# Patient Record
Sex: MALE | Race: BLACK OR AFRICAN AMERICAN | Employment: FULL TIME | URBAN - METROPOLITAN AREA
[De-identification: names, ages, dates, MRNs, and addresses within clinical notes are randomized per-mention and may not be internally consistent; named-entity substitution may affect disease eponyms.]

---

## 2018-04-25 ENCOUNTER — APPOINTMENT (OUTPATIENT)
Dept: GENERAL RADIOLOGY | Age: 48
DRG: 194 | End: 2018-04-25
Attending: EMERGENCY MEDICINE
Payer: COMMERCIAL

## 2018-04-25 ENCOUNTER — OFFICE VISIT (OUTPATIENT)
Dept: FAMILY MEDICINE CLINIC | Facility: CLINIC | Age: 48
End: 2018-04-25

## 2018-04-25 ENCOUNTER — TELEPHONE (OUTPATIENT)
Dept: FAMILY MEDICINE CLINIC | Facility: CLINIC | Age: 48
End: 2018-04-25

## 2018-04-25 ENCOUNTER — HOSPITAL ENCOUNTER (INPATIENT)
Facility: HOSPITAL | Age: 48
LOS: 1 days | Discharge: HOME | DRG: 194 | End: 2018-04-27
Attending: EMERGENCY MEDICINE | Admitting: HOSPITALIST
Payer: COMMERCIAL

## 2018-04-25 VITALS
RESPIRATION RATE: 16 BRPM | HEIGHT: 73 IN | HEART RATE: 75 BPM | WEIGHT: 315 LBS | OXYGEN SATURATION: 95 % | TEMPERATURE: 98 F | DIASTOLIC BLOOD PRESSURE: 84 MMHG | SYSTOLIC BLOOD PRESSURE: 138 MMHG | BODY MASS INDEX: 41.75 KG/M2

## 2018-04-25 DIAGNOSIS — R77.8 ELEVATED TROPONIN: Primary | ICD-10-CM

## 2018-04-25 DIAGNOSIS — Z02.9 ADMINISTRATIVE ENCOUNTER: Primary | ICD-10-CM

## 2018-04-25 PROBLEM — E66.01 MORBID OBESITY WITH BMI OF 45.0-49.9, ADULT (HCC): Status: ACTIVE | Noted: 2018-04-25

## 2018-04-25 PROBLEM — J12.9 VIRAL PNEUMONIA: Status: ACTIVE | Noted: 2018-04-25

## 2018-04-25 PROBLEM — E11.9 TYPE 2 DIABETES MELLITUS WITHOUT COMPLICATION, WITHOUT LONG-TERM CURRENT USE OF INSULIN (HCC): Chronic | Status: ACTIVE | Noted: 2018-04-25

## 2018-04-25 PROBLEM — R79.89 ELEVATED TROPONIN: Status: ACTIVE | Noted: 2018-04-25

## 2018-04-25 PROBLEM — G47.33 OSA (OBSTRUCTIVE SLEEP APNEA): Chronic | Status: ACTIVE | Noted: 2018-04-25

## 2018-04-25 PROBLEM — I10 ESSENTIAL HYPERTENSION: Chronic | Status: ACTIVE | Noted: 2018-04-25

## 2018-04-25 PROBLEM — I50.22 CHRONIC SYSTOLIC HEART FAILURE (HCC): Chronic | Status: ACTIVE | Noted: 2018-04-25

## 2018-04-25 PROCEDURE — 71045 X-RAY EXAM CHEST 1 VIEW: CPT | Performed by: EMERGENCY MEDICINE

## 2018-04-25 PROCEDURE — 99223 1ST HOSP IP/OBS HIGH 75: CPT | Performed by: HOSPITALIST

## 2018-04-25 RX ORDER — FUROSEMIDE 40 MG/1
40 TABLET ORAL 3 TIMES DAILY
COMMUNITY
End: 2018-06-26 | Stop reason: ALTCHOICE

## 2018-04-25 RX ORDER — ISOSORBIDE DINITRATE 20 MG/1
10 TABLET ORAL DAILY
Status: DISCONTINUED | OUTPATIENT
Start: 2018-04-26 | End: 2018-04-26

## 2018-04-25 RX ORDER — AMLODIPINE BESYLATE 5 MG/1
5 TABLET ORAL DAILY
Status: DISCONTINUED | OUTPATIENT
Start: 2018-04-26 | End: 2018-04-27

## 2018-04-25 RX ORDER — ASPIRIN 81 MG/1
324 TABLET, CHEWABLE ORAL ONCE
Status: COMPLETED | OUTPATIENT
Start: 2018-04-25 | End: 2018-04-25

## 2018-04-25 RX ORDER — SPIRONOLACTONE 25 MG/1
25 TABLET ORAL DAILY
Status: DISCONTINUED | OUTPATIENT
Start: 2018-04-26 | End: 2018-04-27

## 2018-04-25 RX ORDER — DIGOXIN 250 MCG
250 TABLET ORAL DAILY
Status: DISCONTINUED | OUTPATIENT
Start: 2018-04-26 | End: 2018-04-27

## 2018-04-25 RX ORDER — IPRATROPIUM BROMIDE AND ALBUTEROL SULFATE 2.5; .5 MG/3ML; MG/3ML
3 SOLUTION RESPIRATORY (INHALATION) EVERY 6 HOURS PRN
Status: DISCONTINUED | OUTPATIENT
Start: 2018-04-25 | End: 2018-04-27

## 2018-04-25 RX ORDER — CARVEDILOL 12.5 MG/1
25 TABLET ORAL 2 TIMES DAILY WITH MEALS
Status: DISCONTINUED | OUTPATIENT
Start: 2018-04-25 | End: 2018-04-27

## 2018-04-25 RX ORDER — CARVEDILOL 25 MG/1
25 TABLET ORAL 2 TIMES DAILY WITH MEALS
COMMUNITY
End: 2020-03-15

## 2018-04-25 RX ORDER — POTASSIUM CHLORIDE 20 MEQ/1
40 TABLET, EXTENDED RELEASE ORAL EVERY 4 HOURS
Status: COMPLETED | OUTPATIENT
Start: 2018-04-25 | End: 2018-04-25

## 2018-04-25 RX ORDER — DOCUSATE SODIUM 100 MG/1
100 CAPSULE, LIQUID FILLED ORAL 2 TIMES DAILY
Status: DISCONTINUED | OUTPATIENT
Start: 2018-04-25 | End: 2018-04-27

## 2018-04-25 RX ORDER — BISACODYL 10 MG
10 SUPPOSITORY, RECTAL RECTAL
Status: DISCONTINUED | OUTPATIENT
Start: 2018-04-25 | End: 2018-04-27

## 2018-04-25 RX ORDER — ENALAPRIL MALEATE 10 MG/1
20 TABLET ORAL DAILY
Status: DISCONTINUED | OUTPATIENT
Start: 2018-04-26 | End: 2018-04-27

## 2018-04-25 RX ORDER — FUROSEMIDE 40 MG/1
40 TABLET ORAL 3 TIMES DAILY
Status: DISCONTINUED | OUTPATIENT
Start: 2018-04-25 | End: 2018-04-27

## 2018-04-25 RX ORDER — DEXTROSE MONOHYDRATE 25 G/50ML
50 INJECTION, SOLUTION INTRAVENOUS
Status: DISCONTINUED | OUTPATIENT
Start: 2018-04-25 | End: 2018-04-27

## 2018-04-25 RX ORDER — LORATADINE 10 MG/1
10 TABLET ORAL DAILY
COMMUNITY
End: 2018-11-21

## 2018-04-25 RX ORDER — HYDRALAZINE HYDROCHLORIDE 50 MG/1
100 TABLET, FILM COATED ORAL 3 TIMES DAILY
Status: DISCONTINUED | OUTPATIENT
Start: 2018-04-25 | End: 2018-04-27

## 2018-04-25 RX ORDER — HYDRALAZINE HYDROCHLORIDE 100 MG/1
100 TABLET, FILM COATED ORAL 3 TIMES DAILY
COMMUNITY
End: 2020-03-15

## 2018-04-25 RX ORDER — ACETAMINOPHEN 325 MG/1
650 TABLET ORAL EVERY 6 HOURS PRN
Status: DISCONTINUED | OUTPATIENT
Start: 2018-04-25 | End: 2018-04-27

## 2018-04-25 RX ORDER — SODIUM PHOSPHATE, DIBASIC AND SODIUM PHOSPHATE, MONOBASIC 7; 19 G/133ML; G/133ML
1 ENEMA RECTAL ONCE AS NEEDED
Status: DISCONTINUED | OUTPATIENT
Start: 2018-04-25 | End: 2018-04-27

## 2018-04-25 RX ORDER — ATORVASTATIN CALCIUM 10 MG/1
10 TABLET, FILM COATED ORAL NIGHTLY
COMMUNITY
End: 2018-04-25

## 2018-04-25 RX ORDER — ISOSORBIDE DINITRATE 10 MG/1
10 TABLET ORAL DAILY
Status: ON HOLD | COMMUNITY
End: 2018-04-27

## 2018-04-25 RX ORDER — ENALAPRIL MALEATE 20 MG/1
20 TABLET ORAL DAILY
COMMUNITY
End: 2018-06-26 | Stop reason: ALTCHOICE

## 2018-04-25 RX ORDER — POLYETHYLENE GLYCOL 3350 17 G/17G
17 POWDER, FOR SOLUTION ORAL DAILY PRN
Status: DISCONTINUED | OUTPATIENT
Start: 2018-04-25 | End: 2018-04-27

## 2018-04-25 RX ORDER — SPIRONOLACTONE 25 MG/1
25 TABLET ORAL DAILY
COMMUNITY
End: 2018-06-26

## 2018-04-25 RX ORDER — DIGOXIN 250 MCG
TABLET ORAL DAILY
COMMUNITY
End: 2018-07-16

## 2018-04-25 RX ORDER — BENZONATATE 200 MG/1
200 CAPSULE ORAL 3 TIMES DAILY PRN
Status: DISCONTINUED | OUTPATIENT
Start: 2018-04-25 | End: 2018-04-27

## 2018-04-25 RX ORDER — CETIRIZINE HYDROCHLORIDE 10 MG/1
10 TABLET ORAL DAILY
Status: DISCONTINUED | OUTPATIENT
Start: 2018-04-26 | End: 2018-04-27

## 2018-04-25 RX ORDER — AMLODIPINE BESYLATE 5 MG/1
5 TABLET ORAL DAILY
COMMUNITY
End: 2018-05-07

## 2018-04-25 NOTE — TELEPHONE ENCOUNTER
Patient was seen through our clinic and left his cell phone in the lobby. Patients cm was contacted and asked to inform the patient. Phone will be kept in lost and found by the walk in clinic.

## 2018-04-25 NOTE — H&P
[facility] HOSPITALIST  History and Physical     Vinod De Paz Patient Status:  Observation    1970 MRN [id number]   [facility] 8NE-A Attending Bela Cano MD   Hosp Day # 0 PCP No primary care provider on file.      Chief Complaint: Co Heart Disorder Maternal Grandmother    • Hypertension Maternal Grandmother    • Diabetes Maternal Grandmother    • Heart Disorder Maternal Grandfather    • Hypertension Maternal Grandfather    • Diabetes Maternal Grandfather        Allergies:   Bactrim [Slater tenderness or deformity. Abdomen: Soft, nontender, nondistended. Positive bowel sounds. No rebound, guarding or organomegaly. Neurologic: No focal neurological deficits. CNII-XII grossly intact. Musculoskeletal: Moves all extremities.   Extremities: No Natividad Riddle, DO  4/25/2018

## 2018-04-25 NOTE — PROGRESS NOTES
04/25/18 1720 04/25/18 1723 04/25/18 1725   Vital Signs   Temp 98.4 °F (36.9 °C) --  --    Temp src Oral --  --    Pulse 74 72 74   Heart Rate Source Monitor Monitor Monitor   Cardiac Rhythm NSR --  --    Resp 18 20 20   Respiratory Quality Normal [employer]

## 2018-04-25 NOTE — PROGRESS NOTES
Ella Soto is a 53year old male who presents to [facility] with c/o weakness, cough, dizziness. Accompanied by: self  After triage, higher acuity of care was recommended to Ella Soto today. Rationale: Pt reports history of CHF.  States he has been feeling w

## 2018-04-25 NOTE — ED PROVIDER NOTES
[facility] [place]    History   Patient presents with:  Fatigue (constitutional, neurologic)    Stated Complaint: fatigue/dizziness    HPI    Hortensia Lubin is a pleasant 52-year-old gentleman who is a recent transplant from with no auricular preauricular or mastoid tenderness. Oropharyngeal exam shows no uvula edema or shift. There is no tongue elevation and palatine tonsils show no purulent material or erythema.   No submandibular erythema and no tenderness along the sterno Final result                 Please view results for these tests on the individual orders. EKG    Rate, intervals and axes as noted on EKG Report.   Rate: 73  Rhythm: Sinus Rhythm  Reading: T-wave inversion laterally           ED Course as of Apr 25

## 2018-04-26 ENCOUNTER — APPOINTMENT (OUTPATIENT)
Dept: CV DIAGNOSTICS | Facility: HOSPITAL | Age: 48
DRG: 194 | End: 2018-04-26
Attending: HOSPITALIST
Payer: COMMERCIAL

## 2018-04-26 PROCEDURE — 93306 TTE W/DOPPLER COMPLETE: CPT | Performed by: HOSPITALIST

## 2018-04-26 PROCEDURE — 99232 SBSQ HOSP IP/OBS MODERATE 35: CPT | Performed by: HOSPITALIST

## 2018-04-26 RX ORDER — ISOSORBIDE DINITRATE 20 MG/1
20 TABLET ORAL
Status: DISCONTINUED | OUTPATIENT
Start: 2018-04-26 | End: 2018-04-26

## 2018-04-26 RX ORDER — ISOSORBIDE DINITRATE 20 MG/1
20 TABLET ORAL 3 TIMES DAILY
Status: DISCONTINUED | OUTPATIENT
Start: 2018-04-26 | End: 2018-04-27

## 2018-04-26 RX ORDER — ISOSORBIDE DINITRATE 20 MG/1
20 TABLET ORAL DAILY
Status: DISCONTINUED | OUTPATIENT
Start: 2018-04-26 | End: 2018-04-26

## 2018-04-26 RX ORDER — AZITHROMYCIN 250 MG/1
500 TABLET, FILM COATED ORAL
Status: DISCONTINUED | OUTPATIENT
Start: 2018-04-26 | End: 2018-04-27

## 2018-04-26 RX ORDER — HEPARIN SODIUM 5000 [USP'U]/ML
5000 INJECTION, SOLUTION INTRAVENOUS; SUBCUTANEOUS EVERY 8 HOURS SCHEDULED
Status: DISCONTINUED | OUTPATIENT
Start: 2018-04-26 | End: 2018-04-27

## 2018-04-26 RX ORDER — ALBUTEROL SULFATE 2.5 MG/3ML
2.5 SOLUTION RESPIRATORY (INHALATION) 3 TIMES DAILY
Status: DISCONTINUED | OUTPATIENT
Start: 2018-04-26 | End: 2018-04-27

## 2018-04-26 NOTE — RESPIRATORY THERAPY NOTE
EVA Equipment Usage Summary :            Set Mode :AUTO CPAP W FLEX          Usage in Hours:7;14          90% Pressure (EPAP) : 17.9           90% Insp Pressure (IPAP);           AHI : 37          Supplemental Oxygen :   3   LPM

## 2018-04-26 NOTE — CONSULTS
[facility]    Report of Consultation    Kathleen Almaguer Patient Status:  Observation    1970 MRN [id number]   [facility] 8NE-A Attending Chandelr Hayden MD   Hosp Day # 0 PCP No primary care provider on file.      Date of Admission:   hypertension diabetes and sleep apnea with been suffering from cold-like symptoms for 3 weeks and to a felt very weak and tired so he came to the ER for evaluation.   Patient is found to have mildly elevated troponin and he is admitted for further evaluatio 4/26/2018] AmLODIPine Besylate (NORVASC) tab 5 mg 5 mg Oral Daily   carvedilol (COREG) tab 25 mg 25 mg Oral BID with meals   [START ON 4/26/2018] digoxin (LANOXIN) tab 250 mcg 250 mcg Oral Daily   [START ON 4/26/2018] Enalapril Maleate (VASOTEC) tab 20 mg HydrALAZINE HCl 100 MG Oral Tab Take 100 mg by mouth 3 (three) times daily. MetFORMIN HCl 1000 MG Oral Tab Take 1,000 mg by mouth 2 (two) times daily with meals. isosorbide dinitrate 10 MG Oral Tab Take 10 mg by mouth daily.      AmLODIPine Besylate 5 wheezes, rales, rhonchi or dullness. Normal excursions and effort. Abdomen:  Soft, non-tender. No organosplenomegally, mass or rebound, BS-present. Extremities:  Without clubbing, cyanosis or edema. Peripheral pulses are 2+.   Neurologic:  Alert and hawk

## 2018-04-26 NOTE — PROGRESS NOTES
[facility]  Progress Note    Julieta Stephens Patient Status:  Observation    1970 MRN [id number]   [facility] 8NE-A Attending Cami Núñez MD   Hosp Day # 0 PCP No primary care provider on file.      Subjective: dyspnea on exertion, pr 04/25/18   1428  04/26/18   0503   NA  140  140   K  3.5*  4.0  4.0   CL  104  105   CO2  32.0  32.0   BUN  19  18   CREATSERUM  1.09  0.95   CA  8.7  8.8   MG   --   2.1   GLU  195*  126*       Recent Labs   Lab  04/25/18   1428   ALT  30   AST  16   ALB spironolactone (ALDACTONE) tab 25 mg 25 mg Oral Daily   glucose (DEX4) oral liquid 15 g 15 g Oral Q15 Min PRN   Or      Glucose-Vitamin C (DEX-4) 4-0.006 g chewable tab 4 tablet 4 tablet Oral Q15 Min PRN   Or      dextrose 50 % injection 50 mL 50 mL Intr uses CPAP at home. 7. h/o smoking til 2014.    8. Diabetes mellitus II - Hb A1C 7.1% - per Hospitalist.    Dig level 0.34    Plan:  - sputum culture  - IV antibiotic to cover bacterial pneumonia  - nasal swab - negative does not exclude viral infection - C

## 2018-04-26 NOTE — PROGRESS NOTES
[facility] H[facility]PITALIST  Progress Note     Lawerence Essex Patient Status:  Observation    1970 MRN [id number]   [facility] 8NE-A Attending Ирина Gann MD   Hosp Day # 0 PCP No primary care provider on file.      Chief Complaint: cough and SOB Besylate  5 mg Oral Daily   • carvedilol  25 mg Oral BID with meals   • digoxin  250 mcg Oral Daily   • Enalapril Maleate  20 mg Oral Daily   • furosemide  40 mg Oral TID   • HydrALAZINE HCl  100 mg Oral TID   • isosorbide dinitrate  10 mg Oral Daily   • c NP,   4/26/2018  [id number]  Patient seen and examined agree with the above  Chest cta b/l  Heart RRR s1 S2 no S3 S4  LEs no edema  Ac viral bronchitsi supportive treatment

## 2018-04-26 NOTE — PAYOR COMM NOTE
--------------  ADMISSION REVIEW     Payor: [facility]  Subscriber #:  [id number]  Authorization Number: N/A    Admit date: N/A  Admit time: N/A       Admitting Physician: Gage Segura MD  Attending Physician:  Mariposa Read MD  Primary Care Physician: Adriano Layton %  O2 Device: None (Room air)    Current:/84   Pulse 71   Temp 98.1 °F (36.7 °C) (Oral)   Resp 20   Ht 185.4 cm (6' 1\")   Wt (!) 154.2 kg   SpO2 93%   BMI 44.86 kg/m²      Physical Exam  Generally the patient is alert and oriented ×3 and appears in Given the patient's cardiac history and slight elevation troponin patient will be admitted at this time    Admission disposition: 4/25/2018  4:03 PM  Disposition and Plan   Clinical Impression:  Elevated troponin  (primary encounter diagnosis)    Disposi orthopnea, or PND. No dizziness, lightheadedness, or syncope. No numbness or tingling in extremities or any focal weakness. No hematochezia, melena, hematuria, hemoptysis, hematemesis.     Past Medical History:  As noted above in ED MD Assessment     All Chest and Back: No tenderness or deformity. Abdomen: Soft, nontender, nondistended. Positive bowel sounds. No rebound, guarding or organomegaly. Neurologic: No focal neurological deficits. CNII-XII grossly intact.   Musculoskeletal: Moves all extremiti Elevated troponin  borderline elevated troponin of unclear significance. With history would repeat troponin levels and follow trend to better assess significance.   Will check echocardiogram.  Troponin levels are higher may consider angiogram in the future Katherine Mercado RN      docusate sodium (COLACE) cap 100 mg     Date Action Dose Route User    4/25/2018 2107 Given 100 mg Oral Ratna Lee RN      furosemide (LASIX) tab 40 mg     Date Action Dose Route User    4/25/2018 2107 Given 40 mg Oral Zena Elder

## 2018-04-27 VITALS
RESPIRATION RATE: 18 BRPM | BODY MASS INDEX: 41.75 KG/M2 | HEIGHT: 73 IN | TEMPERATURE: 98 F | WEIGHT: 315 LBS | HEART RATE: 74 BPM | OXYGEN SATURATION: 94 % | SYSTOLIC BLOOD PRESSURE: 131 MMHG | DIASTOLIC BLOOD PRESSURE: 92 MMHG

## 2018-04-27 PROBLEM — Z99.89 OSA ON CPAP: Chronic | Status: ACTIVE | Noted: 2018-04-25

## 2018-04-27 PROBLEM — G47.33 OSA ON CPAP: Chronic | Status: ACTIVE | Noted: 2018-04-25

## 2018-04-27 PROCEDURE — 99238 HOSP IP/OBS DSCHRG MGMT 30/<: CPT | Performed by: HOSPITALIST

## 2018-04-27 RX ORDER — ALBUTEROL SULFATE 2.5 MG/3ML
2.5 SOLUTION RESPIRATORY (INHALATION) EVERY 6 HOURS PRN
Qty: 1 BOX | Refills: 0 | Status: SHIPPED | OUTPATIENT
Start: 2018-04-27 | End: 2019-10-28

## 2018-04-27 RX ORDER — AZITHROMYCIN 250 MG/1
TABLET, FILM COATED ORAL
Qty: 1 PACKAGE | Refills: 0 | Status: SHIPPED | OUTPATIENT
Start: 2018-04-28 | End: 2018-05-07 | Stop reason: ALTCHOICE

## 2018-04-27 RX ORDER — ALBUTEROL SULFATE 2.5 MG/3ML
2.5 SOLUTION RESPIRATORY (INHALATION) EVERY 6 HOURS PRN
Qty: 1 BOX | Refills: 0 | Status: SHIPPED | OUTPATIENT
Start: 2018-04-27 | End: 2018-04-27

## 2018-04-27 RX ORDER — ASPIRIN 81 MG/1
81 TABLET ORAL DAILY
Qty: 30 TABLET | Refills: 3 | Status: SHIPPED | OUTPATIENT
Start: 2018-04-27 | End: 2018-04-27

## 2018-04-27 RX ORDER — BENZONATATE 200 MG/1
200 CAPSULE ORAL 3 TIMES DAILY PRN
Qty: 20 CAPSULE | Refills: 0 | Status: SHIPPED | OUTPATIENT
Start: 2018-04-27 | End: 2019-10-28 | Stop reason: ALTCHOICE

## 2018-04-27 RX ORDER — AZITHROMYCIN 250 MG/1
TABLET, FILM COATED ORAL
Qty: 1 PACKAGE | Refills: 0 | Status: SHIPPED | OUTPATIENT
Start: 2018-04-28 | End: 2018-04-27

## 2018-04-27 RX ORDER — BENZONATATE 200 MG/1
200 CAPSULE ORAL 3 TIMES DAILY PRN
Qty: 20 CAPSULE | Refills: 0 | Status: SHIPPED | OUTPATIENT
Start: 2018-04-27 | End: 2018-04-27

## 2018-04-27 RX ORDER — ISOSORBIDE DINITRATE 20 MG/1
20 TABLET ORAL 3 TIMES DAILY
Qty: 90 TABLET | Refills: 5 | Status: ON HOLD | OUTPATIENT
Start: 2018-04-27 | End: 2020-09-25

## 2018-04-27 RX ORDER — ASPIRIN 81 MG/1
81 TABLET, CHEWABLE ORAL DAILY
Qty: 30 TABLET | Refills: 0 | Status: SHIPPED | OUTPATIENT
Start: 2018-04-27

## 2018-04-27 NOTE — DIETARY NOTE
Nutrition Short Note    Dietitian consult received for heart failure education. Appropriate education and handout provided. All questions answered, outpatient RD contact information provided, encouraged pt to follow up as outpatient. RD available PRN.

## 2018-04-27 NOTE — PAYOR COMM NOTE
--------------  CONTINUED STAY REVIEW    Payor: [facility]  Subscriber #:  [id number]  Authorization Number: N/A    Admit date: 4/27/18  Admit time: 3984    Admitting Physician: Alice Jj MD  Attending Physician:  Dewayne Calabrese MD  Primary Care Physic inhibitor, hydralazine, isosorbide dinitrate, and amlodipine. 5.  Hypokalemia-will replace per protocol.   6.  EVA- CPAP   Quality:  · DVT Prophylaxis: Low risk  But low EF add heparin if no plan for LHC/ RHC  Per cards   · CODE status: Full  · Gooden: None presentation  - needs better BP control - high BP and respiratory issue may drive pulmonary pressure easily  - review echo   - continue other cardiac meds for heart failure  - increase Isordil to 20 mg PO TID and give together with hydralazine  - no need f 4/27/2018 0858 Given 25 mg Oral Levonne DANIEL Restrepo    4/26/2018 1800 Given 25 mg Oral Renate DANIEL London    4/26/2018 0920 Given 25 mg Oral Renate Surya RN      cetirizine (ZYRTEC) tab 10 mg     Date Action Dose Route User    4/27/2018 0856 Given 10 mg Route User    4/26/2018 1148 Given 1 Units Subcutaneous (Right Upper Arm) Nicolle Harper RN      isosorbide dinitrate (ISORDIL) tab 10 mg     Date Action Dose Route User    4/26/2018 3857 Given 10 mg Oral Nicolle Harper RN      isosorbide dinitrate (

## 2018-04-27 NOTE — PROGRESS NOTES
[facility]  Cardiology Progress Note    Napoleon Stanton Patient Status:  Inpatient    1970 MRN [id number]   [facility] 8NE-A Attending Eliazar Monsivais MD   Hosp Day # 0 PCP No primary care provider on file.      Subjective: Feeling much b to be mismatch with acute respiratory infection, also chronic in the setting of Chronic HF. No ischemia noted on EKG. · Chronic systolic & dystolic HF, most likely related to HTN, EVA and morbid obesity. LVEF (10/17) 20 %.    · HTN controlled now with inc

## 2018-04-27 NOTE — RESPIRATORY THERAPY NOTE
EVA : EQUIPMENT USE: DAILY SUMMARY                                            SET MODE: AUTO CPAP WITH CFLEX                                          USAGE IN HOURS: 11:01                                          9

## 2018-04-27 NOTE — DISCHARGE SUMMARY
[facility] HOSPITALIST  DISCHARGE SUMMARY     Ella Soto Patient Status:  Inpatient    1970 MRN [id number]   [facility] 8NE-A Attending No att. providers found   [facility] Day # 1 PCP No primary care provider on file.      Date of Admission:  nausea, vomiting, diarrhea, constipation. No chest pain,  palpitations, orthopnea, or PND. No dizziness, lightheadedness, or syncope. No numbness or tingling in extremities or any focal weakness.   No hematochezia, melena, hematuria, hemoptysis, hemateme to moderately increased. Systolic function was markedly  reduced. The estimated ejection fraction was 15-20%. Severe diffuse  hypokinesis. 2. Left atrium: The left atrium was mildly to moderately dilated.   3. Right ventricle: Systolic pressure was moderat Tabs  Commonly known as:  ISORDIL  What changed:  · medication strength  · how much to take  · when to take this      Take 1 tablet (20 mg total) by mouth 3 (three) times daily.    Quantity:  90 tablet  Refills:  5        CONTINUE taking these medications (36.4 °C)-98.3 °F (36.8 °C)] 97.7 °F (36.5 °C)  Pulse:  [65-78] 74  Resp:  [18] 18  BP: (101-131)/(38-92) 131/92    Physical Exam:    General: No acute distress. Respiratory:  Diminished  to auscultation bilaterally. No wheezes. No rhonchi.  Yellow sputum

## 2018-04-27 NOTE — PROGRESS NOTES
[facility] H[facility]PITALIST  Progress Note     Adali Cavazos Patient Status:  Observation    1970 MRN [id number]   [facility] 8NE-A Attending Jonah Ridley MD   Hosp Day # 0 PCP No primary care provider on file.      Chief Complaint: cough and SOB isosorbide dinitrate  20 mg Oral TID   • albuterol sulfate  2.5 mg Nebulization TID   • azithromycin  500 mg Oral Daily   • AmLODIPine Besylate  5 mg Oral Daily   • carvedilol  25 mg Oral BID with meals   • digoxin  250 mcg Oral Daily   • Enalapril Maleate overall

## 2018-05-07 ENCOUNTER — OFFICE VISIT (OUTPATIENT)
Dept: FAMILY MEDICINE CLINIC | Facility: CLINIC | Age: 48
End: 2018-05-07

## 2018-05-07 VITALS
WEIGHT: 315 LBS | TEMPERATURE: 98 F | SYSTOLIC BLOOD PRESSURE: 134 MMHG | DIASTOLIC BLOOD PRESSURE: 88 MMHG | HEIGHT: 72 IN | HEART RATE: 83 BPM | RESPIRATION RATE: 18 BRPM | BODY MASS INDEX: 42.66 KG/M2 | OXYGEN SATURATION: 97 %

## 2018-05-07 DIAGNOSIS — I10 ESSENTIAL HYPERTENSION: ICD-10-CM

## 2018-05-07 DIAGNOSIS — E11.9 TYPE 2 DIABETES MELLITUS WITHOUT COMPLICATION, WITHOUT LONG-TERM CURRENT USE OF INSULIN (HCC): Chronic | ICD-10-CM

## 2018-05-07 DIAGNOSIS — Z99.89 OSA ON CPAP: Chronic | ICD-10-CM

## 2018-05-07 DIAGNOSIS — J12.9 VIRAL PNEUMONIA: ICD-10-CM

## 2018-05-07 DIAGNOSIS — I50.22 CHRONIC SYSTOLIC HEART FAILURE (HCC): Primary | Chronic | ICD-10-CM

## 2018-05-07 DIAGNOSIS — Z23 NEED FOR VACCINATION FOR STREP PNEUMONIAE: ICD-10-CM

## 2018-05-07 DIAGNOSIS — G47.33 OSA ON CPAP: Chronic | ICD-10-CM

## 2018-05-07 DIAGNOSIS — E66.01 MORBID OBESITY WITH BMI OF 45.0-49.9, ADULT (HCC): ICD-10-CM

## 2018-05-07 DIAGNOSIS — E78.00 HYPERCHOLESTEROLEMIA: ICD-10-CM

## 2018-05-07 PROBLEM — L72.3 SEBACEOUS CYST: Status: ACTIVE | Noted: 2018-05-07

## 2018-05-07 PROBLEM — D17.22 LIPOMA OF LEFT UPPER EXTREMITY: Status: ACTIVE | Noted: 2018-05-07

## 2018-05-07 PROCEDURE — 99214 OFFICE O/P EST MOD 30 MIN: CPT | Performed by: EMERGENCY MEDICINE

## 2018-05-07 PROCEDURE — 1111F DSCHRG MED/CURRENT MED MERGE: CPT | Performed by: EMERGENCY MEDICINE

## 2018-05-07 PROCEDURE — 90732 PPSV23 VACC 2 YRS+ SUBQ/IM: CPT | Performed by: EMERGENCY MEDICINE

## 2018-05-07 PROCEDURE — 90471 IMMUNIZATION ADMIN: CPT | Performed by: EMERGENCY MEDICINE

## 2018-05-07 RX ORDER — DIPHENHYD/PHENYLEPH/ACETAMINOP 12.5-5-325
TABLET ORAL
Qty: 1 KIT | Refills: 0 | Status: SHIPPED | OUTPATIENT
Start: 2018-05-07 | End: 2018-11-21 | Stop reason: ALTCHOICE

## 2018-05-07 RX ORDER — ROSUVASTATIN CALCIUM 10 MG/1
10 TABLET, COATED ORAL NIGHTLY
Qty: 30 TABLET | Refills: 2 | Status: SHIPPED | OUTPATIENT
Start: 2018-05-07 | End: 2019-10-28

## 2018-05-07 RX ORDER — AMLODIPINE BESYLATE 5 MG/1
5 TABLET ORAL DAILY
Qty: 30 TABLET | Refills: 0 | Status: ON HOLD | OUTPATIENT
Start: 2018-05-07 | End: 2018-06-21

## 2018-05-07 NOTE — PROGRESS NOTES
Chief Complaint:   Patient presents with:  Hospital F/U: NP, shortness of breath and pneumonia     HPI:   This is a 53year old male     [address]  Admitted to hospital for pneumonia. Pt has a long history of CHF.  Adonna Goltz isosorbide dinitrate 20 MG Oral Tab Take 1 tablet (20 mg total) by mouth 3 (three) times daily. Disp: 90 tablet Rfl: 5   benzonatate 200 MG Oral Cap Take 1 capsule (200 mg total) by mouth 3 (three) times daily as needed for cough.  Disp: 20 capsule Rfl: 0 encounter: 72\". Weight as of this encounter: 342 lb. Vital signs reviewed. Appears stated age, well groomed.   GENERAL: well developed, well nourished, well hydrated, no distress  SKIN: good skin turgor, no obvious rashes  HEENT: atraumatic, normocepha blood pressure 1-2 times a day Dx: Hypertension    Hypercholesterolemia   -     LIPID PANEL; Future   -     Rosuvastatin Calcium 10 MG Oral Tab; Take 1 tablet (10 mg total) by mouth nightly.         PATIENT INSTRUCTIONS:    Follow up with DM educator, Kwaku Choe

## 2018-05-07 NOTE — PATIENT INSTRUCTIONS
Thank you for choosing [facility]  To Do:  FOR RICKIE Cash  Follow up with DM educator, [address] with metformin  Have blood tests done after fasting  Arrange for DM eye exam, Dr. Jensen Escobar  Follow up with weight loss clinic  [facility] presence of glucose in the blood. Think of insulin as a key. When insulin reaches a cell, it attaches to the cell wall. This signals the cell to create an opening that allows glucose to enter the cell.       When you have type 2 diabetes  Early in type 2 di

## 2018-05-12 PROBLEM — J12.9 VIRAL PNEUMONIA: Status: RESOLVED | Noted: 2018-04-25 | Resolved: 2018-05-12

## 2018-05-12 PROBLEM — E78.00 HYPERCHOLESTEROLEMIA: Status: ACTIVE | Noted: 2018-05-12

## 2018-06-13 ENCOUNTER — HOSPITAL ENCOUNTER (INPATIENT)
Facility: HOSPITAL | Age: 48
LOS: 7 days | Discharge: HOME | DRG: 223 | End: 2018-06-21
Attending: EMERGENCY MEDICINE | Admitting: HOSPITALIST
Payer: COMMERCIAL

## 2018-06-13 ENCOUNTER — TELEPHONE (OUTPATIENT)
Dept: FAMILY MEDICINE CLINIC | Facility: CLINIC | Age: 48
End: 2018-06-13

## 2018-06-13 ENCOUNTER — APPOINTMENT (OUTPATIENT)
Dept: GENERAL RADIOLOGY | Age: 48
DRG: 223 | End: 2018-06-13
Attending: EMERGENCY MEDICINE
Payer: COMMERCIAL

## 2018-06-13 DIAGNOSIS — I50.9 ACUTE ON CHRONIC CONGESTIVE HEART FAILURE, UNSPECIFIED HEART FAILURE TYPE (HCC): Primary | ICD-10-CM

## 2018-06-13 PROBLEM — I10 BENIGN ESSENTIAL HTN: Chronic | Status: ACTIVE | Noted: 2018-04-25

## 2018-06-13 PROCEDURE — 99220 INITIAL OBSERVATION CARE,LEVL III: CPT | Performed by: HOSPITALIST

## 2018-06-13 PROCEDURE — 71046 X-RAY EXAM CHEST 2 VIEWS: CPT | Performed by: EMERGENCY MEDICINE

## 2018-06-13 PROCEDURE — 5A09357 ASSISTANCE WITH RESPIRATORY VENTILATION, LESS THAN 24 CONSECUTIVE HOURS, CONTINUOUS POSITIVE AIRWAY PRESSURE: ICD-10-PCS | Performed by: INTERNAL MEDICINE

## 2018-06-13 RX ORDER — HYDRALAZINE HYDROCHLORIDE 50 MG/1
100 TABLET, FILM COATED ORAL EVERY 8 HOURS SCHEDULED
Status: DISCONTINUED | OUTPATIENT
Start: 2018-06-13 | End: 2018-06-21

## 2018-06-13 RX ORDER — DEXTROSE MONOHYDRATE 25 G/50ML
50 INJECTION, SOLUTION INTRAVENOUS
Status: DISCONTINUED | OUTPATIENT
Start: 2018-06-13 | End: 2018-06-21

## 2018-06-13 RX ORDER — FUROSEMIDE 10 MG/ML
40 INJECTION INTRAMUSCULAR; INTRAVENOUS
Status: DISCONTINUED | OUTPATIENT
Start: 2018-06-14 | End: 2018-06-14

## 2018-06-13 RX ORDER — ROSUVASTATIN CALCIUM 10 MG/1
10 TABLET, COATED ORAL NIGHTLY
Status: DISCONTINUED | OUTPATIENT
Start: 2018-06-13 | End: 2018-06-21

## 2018-06-13 RX ORDER — SPIRONOLACTONE 25 MG/1
25 TABLET ORAL DAILY
Status: DISCONTINUED | OUTPATIENT
Start: 2018-06-14 | End: 2018-06-21

## 2018-06-13 RX ORDER — ENOXAPARIN SODIUM 100 MG/ML
40 INJECTION SUBCUTANEOUS DAILY
Status: DISCONTINUED | OUTPATIENT
Start: 2018-06-13 | End: 2018-06-13 | Stop reason: DRUGHIGH

## 2018-06-13 RX ORDER — CETIRIZINE HYDROCHLORIDE 10 MG/1
10 TABLET ORAL DAILY
Status: DISCONTINUED | OUTPATIENT
Start: 2018-06-14 | End: 2018-06-21

## 2018-06-13 RX ORDER — ISOSORBIDE DINITRATE 20 MG/1
20 TABLET ORAL 3 TIMES DAILY
Status: DISCONTINUED | OUTPATIENT
Start: 2018-06-14 | End: 2018-06-21

## 2018-06-13 RX ORDER — ENALAPRIL MALEATE 10 MG/1
20 TABLET ORAL DAILY
Status: DISCONTINUED | OUTPATIENT
Start: 2018-06-13 | End: 2018-06-21

## 2018-06-13 RX ORDER — DIGOXIN 250 MCG
250 TABLET ORAL DAILY
Status: DISCONTINUED | OUTPATIENT
Start: 2018-06-14 | End: 2018-06-21

## 2018-06-13 RX ORDER — ACETAMINOPHEN 325 MG/1
650 TABLET ORAL EVERY 6 HOURS PRN
Status: DISCONTINUED | OUTPATIENT
Start: 2018-06-13 | End: 2018-06-21

## 2018-06-13 RX ORDER — FUROSEMIDE 10 MG/ML
40 INJECTION INTRAMUSCULAR; INTRAVENOUS ONCE
Status: COMPLETED | OUTPATIENT
Start: 2018-06-13 | End: 2018-06-13

## 2018-06-13 RX ORDER — TEMAZEPAM 15 MG/1
15 CAPSULE ORAL NIGHTLY PRN
Status: DISCONTINUED | OUTPATIENT
Start: 2018-06-13 | End: 2018-06-21

## 2018-06-13 RX ORDER — ALBUTEROL SULFATE 2.5 MG/3ML
2.5 SOLUTION RESPIRATORY (INHALATION) EVERY 6 HOURS PRN
Status: DISCONTINUED | OUTPATIENT
Start: 2018-06-13 | End: 2018-06-21

## 2018-06-13 RX ORDER — BENZONATATE 200 MG/1
200 CAPSULE ORAL 3 TIMES DAILY PRN
Status: DISCONTINUED | OUTPATIENT
Start: 2018-06-13 | End: 2018-06-21

## 2018-06-13 RX ORDER — ASPIRIN 81 MG/1
81 TABLET, CHEWABLE ORAL DAILY
Status: DISCONTINUED | OUTPATIENT
Start: 2018-06-14 | End: 2018-06-21

## 2018-06-13 RX ORDER — ENOXAPARIN SODIUM 100 MG/ML
0.5 INJECTION SUBCUTANEOUS NIGHTLY
Status: DISCONTINUED | OUTPATIENT
Start: 2018-06-13 | End: 2018-06-19

## 2018-06-13 RX ORDER — CARVEDILOL 12.5 MG/1
25 TABLET ORAL 2 TIMES DAILY WITH MEALS
Status: DISCONTINUED | OUTPATIENT
Start: 2018-06-14 | End: 2018-06-21

## 2018-06-13 RX ORDER — AMLODIPINE BESYLATE 5 MG/1
5 TABLET ORAL DAILY
Status: DISCONTINUED | OUTPATIENT
Start: 2018-06-14 | End: 2018-06-14

## 2018-06-13 NOTE — TELEPHONE ENCOUNTER
Pt called stating that he has been experiencing SOB, dizziness, inability to take a deep breath since yesterday. Pt states that he has also been excessively sweating. Pt states certain positions make his symptoms worse.  Pt states that he has been diagnosed

## 2018-06-13 NOTE — ED PROVIDER NOTES
Patient Seen in: [address] [facility] In [place]    History   Patient presents with:  Dyspnea MICHELA SOB (respiratory)    Stated Complaint: michela     HPI     67-year-old -American male who presents emergent today for complaint of difficulty b alert and appears in no acute discomfort or distress. Vital signs are stable he is afebrile    Head is normocephalic atraumatic conjunctiva is clear. Sclerae anicteric. Neck is supple. Lungs are clear to auscultation bilaterally.   Heart is regular -----------         ------                     CBC W/ DIFFERENTIAL[[id number]]          Abnormal            Final result                 Please view results for these tests on the individual orders.    [address] [facility] was fairly dyspneic here with minimal exertion. Patient will be admitted to cardiac telemetry floor. I spoke to the [facility] they agree to admit the patient primarily and have cardiology see the patient again.   Patient will be transferred to

## 2018-06-14 PROCEDURE — 99232 SBSQ HOSP IP/OBS MODERATE 35: CPT | Performed by: HOSPITALIST

## 2018-06-14 RX ORDER — POTASSIUM CHLORIDE 20 MEQ/1
40 TABLET, EXTENDED RELEASE ORAL ONCE
Status: COMPLETED | OUTPATIENT
Start: 2018-06-14 | End: 2018-06-14

## 2018-06-14 RX ORDER — BUMETANIDE 0.25 MG/ML
2 INJECTION, SOLUTION INTRAMUSCULAR; INTRAVENOUS ONCE
Status: COMPLETED | OUTPATIENT
Start: 2018-06-14 | End: 2018-06-14

## 2018-06-14 NOTE — DIETARY NOTE
Nutrition Short Note    Dietitian consult received for education. Recent dx of CHF - reviewed low NA diet. Discussed watching carbs.   Pt states he keeps trying to follow healthy diet - having difficulties with work schedule.  has stopped using excess salt

## 2018-06-14 NOTE — H&P
[facility] HOSPITALIST  History and Physical     Dm Bess Patient Status:  Observation    1970 MRN [id number]   [facility] [place] 2NE-A Attending Roswell Sicard, MD   Hosp Day # 0 PCP Lorelei Smith MD     Chief Complaint: dyspnea    Hist Tab Take 1 tablet (5 mg total) by mouth daily. Disp: 30 tablet Rfl: 0   aspirin 81 MG Oral Chew Tab Chew 1 tablet (81 mg total) by mouth daily.  Disp: 30 tablet Rfl: 0   isosorbide dinitrate 20 MG Oral Tab Take 1 tablet (20 mg total) by mouth 3 (three) time Regular rate and rhythm. No murmurs, no rubs or gallops. Equal pulses. Chest and Back: No tenderness or deformity. Abdomen: Soft, nontender, nondistended. Positive bowel sounds. No rebound, guarding or organomegaly.   Neurologic: No focal neurological d

## 2018-06-14 NOTE — PLAN OF CARE
Patient admitted from [place] ED. Oriented to the unit and the room. Admission navigator completed. Vital signs stable. Denies any pain or discomfort. Up ad sukhjinder. Meds given per MAR. Cpap worn at night. Call light in reach. Safety maintained.      Lu Wolfe

## 2018-06-14 NOTE — PROGRESS NOTES
[facility] H[facility]PITAL[facility]  Progress Note     Isaac Burgess Patient Status:  Inpatient    1970 MRN [id number]   [facility] 2NE-A Attending Gentry Conteh MD   Hosp Day # 0 PCP Gisele Wang MD     Chief Complaint: dyspnea    S: Patient  Had Oral Daily   • isosorbide dinitrate  20 mg Oral TID   • Rosuvastatin Calcium  10 mg Oral Nightly   • Insulin Aspart Pen  1-68 Units Subcutaneous TID CC   • Insulin Aspart Pen  1-10 Units Subcutaneous TID AC and HS   • enoxaparin  0.5 mg/kg Subcutaneous Nig

## 2018-06-14 NOTE — PROGRESS NOTES
[facility] Progress Note:  Anticoagulation Weight Dose Adjustment for enoxaparin (LOVENOX)    Liyah Case is a 53year old male who has been prescribed enoxaparin (LOVENOX) for VTE prophylaxis.       Estimated Creatinine Clearance: 82.6 mL/min (based on S

## 2018-06-14 NOTE — PAYOR COMM NOTE
--------------  ADMISSION REVIEW     Payor: [facility] PPO  Subscriber #:  [id number]  Authorization Number: N/A    Admit date: 06/14/2018   Admit time: 1103      Admitting Physician: Marli La MD  Attending Physician:  Мария Arango MD  Primary Care P °C)  Temp src: Temporal  SpO2: 97 %  O2 Device: None (Room air)    Current:/84   Pulse 77   Temp 97.9 °F (36.6 °C) (Temporal)   Resp 22   Ht 185.4 cm (6' 1\")   Wt (!) 154.2 kg   SpO2 97%   BMI 44.86 kg/m²      Physical Exam  -American male wh 2018 no significant interval changes noted.     Chest x-ray reveals:  FINDINGS:  Lung volumes are upper normal.  Increased markings at the lung bases, left greater than right are similar to the prior.  The lack of interval change would favor postinflammator chronic congestive heart failure ([facility] I50.9 6/13/2018 Unknown     Elena Gallardo MD on 6/13/2018  6:29 PM       H&P signed by Trish Marie MD at 6/13/2018 10:50 PM     Author:  Trish Marie MD Service:  Hospitalist Author Type:  Physician    File 25 MG Oral Tab Take 25 mg by mouth 2 (two) times daily with meals. Disp:  Rfl:    Enalapril Maleate 20 MG Oral Tab Take 20 mg by mouth daily. Disp:  Rfl:    furosemide 40 MG Oral Tab Take 40 mg by mouth 3 (three) times daily.  Disp:  Rfl:    spironolactone Labs:  Recent Labs   Lab  06/13/18   1509   WBC  6.4   HGB  12.4*   MCV  77.7*   PLT  208.0     Lab  06/13/18   1509   GLU  165*   BUN  18   CREATSERUM  1.25   GFRAA  79   GFRNAA  68   CA  8.2*   ALB  3.3*   NA  142   K  3.7   CL  107   CO2  30.0   ALKPH furosemide (LASIX) injection 40 mg     Date Action Dose Route User    6/14/2018 0839 Given 40 mg Intravenous Juancho Burleson, RN      hydrALAzine HCl (APRESOLINE) tab 100 mg     Date Action Dose Route User    6/14/2018 0556 Given 100 mg Oral Nishi Rump

## 2018-06-14 NOTE — CONSULTS
[facility]  Advanced Heart Failure Consultation    Lawerence Essex Patient Status:  Observation    1970 MRN [id number]   [facility] 2NE-A Attending Del Osuna MD   Hosp Day # 0 PCP Kiah Ellison MD     Reason for Consultation 100 mg, Oral, Q8H [address]  •  digoxin (LANOXIN) tab 250 mcg, 250 mcg, Oral, Daily  •  cetirizine (ZYRTEC) tab 10 mg, 10 mg, Oral, Daily  •  aspirin chewable tab 81 mg, 81 mg, Oral, Daily  •  isosorbide dinitrate (ISORDIL) tab 20 mg, 20 mg, Oral, TID  •  benzon 336 lb (152.4 kg)  05/07/18 : (!) 342 lb (155.1 kg)  04/27/18 : (!) 333 lb 1.8 oz (151.1 kg)      Intake/Output Summary (Last 24 hours) at 06/14/18 1046  Last data filed at 06/14/18 0840   Gross per 24 hour   Intake              360 ml   Output without complication, without long-term current use of insulin (HCC)     Benign essential HTN     EVA (obstructive sleep apnea)     Morbid obesity with BMI of 45.0-49.9, adult (HCC)     Lipoma of left upper extremity     Sebaceous cyst     Hypercholesterol

## 2018-06-14 NOTE — CM/SW NOTE
06/14/18 1110   CM/SW Screening   Referral [facility] staff; Chart review;Nursing rounds   Patient's Mental Status Alert;Oriented   Patient's [address]   Patient lives with Spouse; Children   Patient Status P

## 2018-06-14 NOTE — PLAN OF CARE
Heart Failure Coordinator Progress Note    Attempt made by the Heart Failure Coordinator to assess for understanding, verbalization, demonstration, and recall of education related to heart failure, overall adherence to the behaviors ne

## 2018-06-15 ENCOUNTER — APPOINTMENT (OUTPATIENT)
Dept: MRI IMAGING | Facility: HOSPITAL | Age: 48
DRG: 223 | End: 2018-06-15
Attending: INTERNAL MEDICINE
Payer: COMMERCIAL

## 2018-06-15 PROCEDURE — 75561 CARDIAC MRI FOR MORPH W/DYE: CPT | Performed by: INTERNAL MEDICINE

## 2018-06-15 PROCEDURE — 99232 SBSQ HOSP IP/OBS MODERATE 35: CPT | Performed by: HOSPITALIST

## 2018-06-15 RX ORDER — POTASSIUM CHLORIDE 20 MEQ/1
40 TABLET, EXTENDED RELEASE ORAL ONCE
Status: COMPLETED | OUTPATIENT
Start: 2018-06-15 | End: 2018-06-15

## 2018-06-15 NOTE — CONSULTS
[facility]  Report of Consultation    Napoleon Stanton Patient Status:  Inpatient    1970 MRN [id number]   [facility] 2NE-A Attending Jessica Su MD   Hosp Day # 1 PCP Lilli Soto MD     Reason History:  Past Medical History:   Diagnosis Date   • Calculus of kidney    • Congestive heart disease ([facility]    • Depression    • Diabetes [facility]    • Essential hypertension    • High blood pressure    • High cholesterol    • Obesity    • Sleep apnea mL, 30 mL, Oral, Daily PRN  •  glucose (DEX4) oral liquid 15 g, 15 g, Oral, Q15 Min PRN **OR** Glucose-Vitamin C (DEX-4) 4-0.006 g chewable tab 4 tablet, 4 tablet, Oral, Q15 Min PRN **OR** dextrose 50 % injection 50 mL, 50 mL, Intravenous, Q15 Min PRN **OR 0.95   ----------    Lab Results  Component Value Date   INR 1.02 04/25/2018         Justo Chamberlain  6/15/2018  12:42 PM

## 2018-06-15 NOTE — PAYOR COMM NOTE
--------------  CONTINUED STAY REVIEW    Payor: [facility] PPO  Subscriber #:  [id number]  Authorization Number: N/A    Admit date: 6/14/18  Admit time: 1103    Admitting Physician: Kaylee Acosta MD  Attending Physician:  Ken Arana MD  Primary Care y Given 81 mg Oral Tianna Ureña RN      bumetanide (BUMEX) injection 2 mg     Date Action Dose Route User    6/14/2018 1226 Given 2 mg Intravenous Raj Chapman RN      bumetanide (BUMEX) 12.5 mg in sodium chloride 0.9 % 100 mL infusion     Date Actio Rina Newsome, RN      spironolactone (ALDACTONE) tab 25 mg     Date Action Dose Route User    6/15/2018 0906 Given 25 mg Oral Tianna Ureña, DANIEL          FOR REVIEW/APPROVAL OF INPT ADMISSION    PLEASE FAX ALL INPT DAYS AS AUTHORIZED ALONG W/NRD

## 2018-06-15 NOTE — PROGRESS NOTES
[facility]  Advanced Heart Failure Progress Note    Kylah Ross Patient Status:  Inpatient    1970 MRN [id number]   [facility] 2NE-A Attending Magnus Carey MD   Hosp Day # 1 PCP Alda Santoyo MD     Subjective:  Feels a lot ----------    Medications:    Current Facility-Administered Medications:  bumetanide (BUMEX) 12.5 mg in sodium chloride 0.9 % 100 mL infusion 1 mg/hr Intravenous Continuous   carvedilol (COREG) tab 25 mg 25 mg Oral BID with meals   Enalapril Maleate (VAS on CPAP     Morbid obesity with BMI of 45.0-49.9, adult (HCC)     Lipoma of left upper extremity     Sebaceous cyst     Hypercholesterolemia     Acute on chronic congestive heart failure (HCC)     Acute on chronic congestive heart failure, unspecified hear

## 2018-06-15 NOTE — PAYOR COMM NOTE
--------------  CONTINUED STAY REVIEW    Payor: [facility] PPO  Subscriber #:  [id number]  Authorization Number: N/A    Admit date: 6/14/18  Admit time: 1103    Admitting Physician: Megan Arredondo MD  Attending Physician:  Burgess Carlos MD  Primary Care y Laura Madsen  6/15/2018  12:42 PM       MEDICATIONS ADMINISTERED IN LAST 1 DAY:  acetaminophen (TYLENOL) tab 650 mg     Date Action Dose Route User    6/15/2018 0558 Given 650 mg Oral Em Chatman RN      aspirin chewable tab 81 mg     Date Action Dose Route  Subcutaneous (Left Upper Abdomen) Tianna Ureña, DANIEL      isosorbide dinitrate (ISORDIL) tab 20 mg     Date Action Dose Route User    6/15/2018 1253 Given 20 mg Oral Atul Watkins RN    6/15/2018 0600 Given 20 mg Oral Hamilton Jose RN    6/14/2018

## 2018-06-15 NOTE — PROGRESS NOTES
Heart Failure Coordinator Progress Note    Patient was evaluated by the Heart Failure Coordinator for understanding, verbalization, demonstration, and recall of education related to heart failure, overall adherence to the behaviors Leda Glassing understanding of the instructions and is in agreement with the plans.      Danny Antonio RN, BSN,  [facility]  Heart Failure Coordinator  [phone]

## 2018-06-15 NOTE — PROGRESS NOTES
[facility] HOSPITALIST  Progress Note     Lawerence Essex Patient Status:  Inpatient    1970 MRN [id number]   [facility] 2NE-A Attending Del Osuna MD   Hosp Day # 1 PCP Madison Devine MD     Chief Complaint: dyspnea    S: Patient  Sti 250 mcg Oral Daily   • cetirizine  10 mg Oral Daily   • aspirin  81 mg Oral Daily   • isosorbide dinitrate  20 mg Oral TID   • Rosuvastatin Calcium  10 mg Oral Nightly   • Insulin Aspart Pen  1-68 Units Subcutaneous TID CC   • Insulin Aspart Pen  1-10 Unit Lorri Osorio, NP, APN  6/14/2018  [id number]      IM ATTENDING    Joselo Catherine note reviewed and agree with above    S- feels better today    General- NAD  Chest- Diminished bilaterally  CVS- RRR  Abdo- soft, NT, BS+    Plan  Continue diuresis  Margaret

## 2018-06-16 ENCOUNTER — APPOINTMENT (OUTPATIENT)
Dept: CT IMAGING | Facility: HOSPITAL | Age: 48
DRG: 223 | End: 2018-06-16
Attending: STUDENT IN AN ORGANIZED HEALTH CARE EDUCATION/TRAINING PROGRAM
Payer: COMMERCIAL

## 2018-06-16 PROCEDURE — 99232 SBSQ HOSP IP/OBS MODERATE 35: CPT | Performed by: HOSPITALIST

## 2018-06-16 PROCEDURE — 70450 CT HEAD/BRAIN W/O DYE: CPT | Performed by: STUDENT IN AN ORGANIZED HEALTH CARE EDUCATION/TRAINING PROGRAM

## 2018-06-16 RX ORDER — POTASSIUM CHLORIDE 20 MEQ/1
40 TABLET, EXTENDED RELEASE ORAL ONCE
Status: COMPLETED | OUTPATIENT
Start: 2018-06-16 | End: 2018-06-16

## 2018-06-16 NOTE — PROGRESS NOTES
[facility] HOSPITALIST  Progress Note     Yaima Chacon Patient Status:  Inpatient    1970 MRN [id number]   [facility] 2NE-A Attending Shikha Lee MD   Hosp Day # 2 PCP Melania Anne MD     Chief Complaint: dyspnea    S: feels well, Imaging: Imaging data reviewed in Epic.     Medications:   • carvedilol  25 mg Oral BID with meals   • Enalapril Maleate  20 mg Oral Daily   • spironolactone  25 mg Oral Daily   • HydrALAZINE HCl  100 mg Oral Q8H [address]   • digoxin  250 mcg Oral Celso Francisco

## 2018-06-16 NOTE — PROGRESS NOTES
[facility] Cardiology Progress Note    Subjective:  Had cramping of legs during night, got up and fell back on bed. [address] Jenelle Perez now. Breathing much better.      Objective:  BP 95/73 (BP Location: Right arm)   Pulse 75   Temp 98 °F (36.7 °C) (Oral)   Resp 16   Ht 185.4

## 2018-06-16 NOTE — PLAN OF CARE
Patient alert and oriented. NSR on tele. Room air. Complains of dizziness when bp low. Cards addressed symptomatic hypotension. Continue bumex. Patient states he feels better this morning but worried about new episodes of repeat hypotension.  Patient Gavi Winchester

## 2018-06-17 PROCEDURE — 99232 SBSQ HOSP IP/OBS MODERATE 35: CPT | Performed by: HOSPITALIST

## 2018-06-17 RX ORDER — SODIUM CHLORIDE 9 MG/ML
INJECTION, SOLUTION INTRAVENOUS CONTINUOUS
Status: DISCONTINUED | OUTPATIENT
Start: 2018-06-18 | End: 2018-06-21

## 2018-06-17 RX ORDER — POTASSIUM CHLORIDE 20 MEQ/1
40 TABLET, EXTENDED RELEASE ORAL EVERY 4 HOURS
Status: DISCONTINUED | OUTPATIENT
Start: 2018-06-17 | End: 2018-06-17

## 2018-06-17 RX ORDER — ASPIRIN 81 MG/1
324 TABLET, CHEWABLE ORAL ONCE
Status: COMPLETED | OUTPATIENT
Start: 2018-06-18 | End: 2018-06-18

## 2018-06-17 RX ORDER — POTASSIUM CHLORIDE 20 MEQ/1
40 TABLET, EXTENDED RELEASE ORAL EVERY 4 HOURS
Status: COMPLETED | OUTPATIENT
Start: 2018-06-17 | End: 2018-06-17

## 2018-06-17 NOTE — PLAN OF CARE
Assumed care ar 2330  Aox4, VSS on RA, NSR per tele. States he feels \"pretty good\" denies any further cramps. Patient reminded to call for assistance, verbalized understanding. Bumex infusing at 4mg/hr.  Due medications given, needs attended, will continu

## 2018-06-17 NOTE — PROGRESS NOTES
[facility] Cardiology Progress Note    Subjective:  No further cramping. Still a bit fatigued. Discussed [facility] and questions answered.      Objective:  /43 (BP Location: Left arm)   Pulse 77   Temp 97.7 °F (36.5 °C) (Oral)   Resp 18   Ht 185.4 cm (6' 1\")

## 2018-06-17 NOTE — PROGRESS NOTES
[facility] HOSPITALIST  Progress Note     Lawerence Essex Patient Status:  Inpatient    1970 MRN [id number]   [facility] 2NE-A Attending Del Osuna MD   Hosp Day # 3 PCP Madison Devine MD     Chief Complaint: dyspnea    S: no cramping, 06/14/18   0051  06/14/18   0739   TROP  0.089*  0.084*  0.081*            Imaging: Imaging data reviewed in Epic.     Medications:   • [START ON 6/18/2018] aspirin  324 mg Oral Once   • Potassium Chloride ER  40 mEq Oral Q4H   • carvedilol  25 mg Oral BID

## 2018-06-18 PROCEDURE — 99232 SBSQ HOSP IP/OBS MODERATE 35: CPT | Performed by: HOSPITALIST

## 2018-06-18 NOTE — PROGRESS NOTES
[facility] HOSPITALIST  Progress Note     Julieta Stephens Patient Status:  Inpatient    1970 MRN [id number]   [facility] 2NE-A Attending Cr Carrasquillo MD   Hosp Day # 4 PCP Morgan Rees MD     Chief Complaint: dyspnea    S: no issues ov not displayed. Estimated Creatinine Clearance: 98.3 mL/min (based on SCr of 1.05 mg/dL). No results for input(s): PTP, INR in the last 168 hours.     Recent Labs   Lab  06/13/18   1509  06/14/18   0051  06/14/18   0739   TROP  0.089*  0.084*  0.081

## 2018-06-18 NOTE — PROGRESS NOTES
[facility]  Cardiology Progress Note    Zahra Aguilar Patient Status:  Inpatient    1970 MRN [id number]   [facility] 2NE-A Attending Layton Morton MD   Hosp Day # 4 PCP Aidan Navarro MD     Subjective:  Patient feels drowsy to Insulin Aspart Pen  1-68 Units Subcutaneous TID CC   • Insulin Aspart Pen  1-10 Units Subcutaneous TID AC and HS   • enoxaparin  0.5 mg/kg Subcutaneous Nightly     • sodium chloride         Assessment:  · Acute on chronic systolic HF, LVEF 66-39%, PAS 52 m

## 2018-06-19 ENCOUNTER — APPOINTMENT (OUTPATIENT)
Dept: INTERVENTIONAL RADIOLOGY/VASCULAR | Facility: HOSPITAL | Age: 48
DRG: 223 | End: 2018-06-19
Attending: INTERNAL MEDICINE
Payer: COMMERCIAL

## 2018-06-19 PROCEDURE — B2111ZZ FLUOROSCOPY OF MULTIPLE CORONARY ARTERIES USING LOW OSMOLAR CONTRAST: ICD-10-PCS | Performed by: INTERNAL MEDICINE

## 2018-06-19 PROCEDURE — 99232 SBSQ HOSP IP/OBS MODERATE 35: CPT | Performed by: INTERNAL MEDICINE

## 2018-06-19 PROCEDURE — 4A023N8 MEASUREMENT OF CARDIAC SAMPLING AND PRESSURE, BILATERAL, PERCUTANEOUS APPROACH: ICD-10-PCS | Performed by: INTERNAL MEDICINE

## 2018-06-19 RX ORDER — SODIUM CHLORIDE 9 MG/ML
INJECTION, SOLUTION INTRAVENOUS CONTINUOUS
Status: DISCONTINUED | OUTPATIENT
Start: 2018-06-20 | End: 2018-06-21

## 2018-06-19 RX ORDER — MIDAZOLAM HYDROCHLORIDE 1 MG/ML
INJECTION INTRAMUSCULAR; INTRAVENOUS
Status: COMPLETED
Start: 2018-06-19 | End: 2018-06-19

## 2018-06-19 RX ORDER — CHLORHEXIDINE GLUCONATE 4 G/100ML
30 SOLUTION TOPICAL
Status: COMPLETED | OUTPATIENT
Start: 2018-06-20 | End: 2018-06-20

## 2018-06-19 RX ORDER — HEPARIN SODIUM 5000 [USP'U]/ML
INJECTION, SOLUTION INTRAVENOUS; SUBCUTANEOUS
Status: COMPLETED
Start: 2018-06-19 | End: 2018-06-19

## 2018-06-19 RX ORDER — ENOXAPARIN SODIUM 100 MG/ML
0.5 INJECTION SUBCUTANEOUS NIGHTLY
Status: DISCONTINUED | OUTPATIENT
Start: 2018-06-20 | End: 2018-06-21

## 2018-06-19 RX ORDER — FUROSEMIDE 40 MG/1
40 TABLET ORAL 3 TIMES DAILY
Status: DISCONTINUED | OUTPATIENT
Start: 2018-06-20 | End: 2018-06-21

## 2018-06-19 RX ORDER — SODIUM CHLORIDE 9 MG/ML
INJECTION, SOLUTION INTRAVENOUS CONTINUOUS
Status: ACTIVE | OUTPATIENT
Start: 2018-06-19 | End: 2018-06-19

## 2018-06-19 RX ORDER — FUROSEMIDE 40 MG/1
40 TABLET ORAL 3 TIMES DAILY
Status: DISCONTINUED | OUTPATIENT
Start: 2018-06-19 | End: 2018-06-19

## 2018-06-19 RX ORDER — LIDOCAINE HYDROCHLORIDE 10 MG/ML
INJECTION, SOLUTION EPIDURAL; INFILTRATION; INTRACAUDAL; PERINEURAL
Status: COMPLETED
Start: 2018-06-19 | End: 2018-06-19

## 2018-06-19 NOTE — PROCEDURES
[facility]    PATIENT'S NAME: Wesly Villanueva   ATTENDING PHYSICIAN: Julia Gloria MD   OPERATING PHYSICIAN: Shahrzad Cavazos MD   PATIENT ACCOUNT#:   [id number]    LOCATION:  [address]  MEDICAL RECORD #:   [id number]       DATE OF BIRTH:  [birth year]/ aortic valve pullback. Perclose device was deployed in the right femoral artery for hemostasis. The IV was maintained by the RN, and moderate conscious sedation of Versed and fentanyl was given.   The patient was assessed and monitored for oxygen, hea

## 2018-06-19 NOTE — PROGRESS NOTES
[facility] HOSPITALIST  Progress Note     Brad Cleveland Patient Status:  Inpatient    1970 MRN [id number]   [facility] 2NE-A Attending Vanna Carter MD   Hosp Day # 5 PCP Gwendolyn Beltran MD     Chief Complaint: dyspnea    S:  Frustrated this interval not displayed. Estimated Creatinine Clearance: 98.3 mL/min (based on SCr of 1.05 mg/dL). No results for input(s): PTP, INR in the last 168 hours.     Recent Labs   Lab  06/13/18   1509  06/14/18   0051  06/14/18   0739   TROP  0.089* kg)   SpO2 97%   BMI 41.74 kg/m²   CV: RRR  PULM: CTAB

## 2018-06-19 NOTE — PROGRESS NOTES
[facility]  Progress Note    Nima Whitley Patient Status:  Inpatient    1970 MRN [id number]   [facility] 2NE-A Attending Valentina Goetz MD   Hosp Day # 5 PCP Johana Belle MD     Assessment:  1.   Nonischemic CMP (EF=15-20%), Oral Daily   • HydrALAZINE HCl  100 mg Oral Q8H [facility]   • digoxin  250 mcg Oral Daily   • cetirizine  10 mg Oral Daily   • aspirin  81 mg Oral Daily   • isosorbide dinitrate  20 mg Oral TID   • Rosuvastatin Calcium  10 mg Oral Nightly   • Insulin Aspart Pen

## 2018-06-19 NOTE — PROGRESS NOTES
[facility]  Progress Note    Mao Spotted Patient Status:  Inpatient    1970 MRN [id number]   [facility] Rams[facility]   Hosp Day # 5 PCP Deshawn Montano MD       Assessment:    · Non-ischem 100 mg Oral Q8H [facility]   • digoxin  250 mcg Oral Daily   • cetirizine  10 mg Oral Daily   • aspirin  81 mg Oral Daily   • isosorbide dinitrate  20 mg Oral TID   • Rosuvastatin Calcium  10 mg Oral Nightly   • Insulin Aspart Pen  1-68 Units Subcutaneous TID CC

## 2018-06-19 NOTE — PLAN OF CARE
Problem: CARDIOVASCULAR - ADULT  Goal: Absence of cardiac arrhythmias or at baseline  INTERVENTIONS:  - Continuous cardiac monitoring, monitor vital signs, obtain 12 lead EKG if indicated  - Evaluate effectiveness of antiarrhythmic and heart rate control m explained to pt  Rt / LT heart cath in am- pre cath orders initiated  Pt stable

## 2018-06-19 NOTE — PLAN OF CARE
CARDIOVASCULAR - ADULT    • Maintains optimal cardiac output and hemodynamic stability Progressing    • Absence of cardiac arrhythmias or at baseline Progressing          Plan for cath this afternoon  Patient prepped and consent signed  0.9 IVF in room  Pa

## 2018-06-20 ENCOUNTER — APPOINTMENT (OUTPATIENT)
Dept: INTERVENTIONAL RADIOLOGY/VASCULAR | Facility: HOSPITAL | Age: 48
DRG: 223 | End: 2018-06-20
Attending: INTERNAL MEDICINE
Payer: COMMERCIAL

## 2018-06-20 ENCOUNTER — TELEPHONE (OUTPATIENT)
Dept: FAMILY MEDICINE CLINIC | Facility: CLINIC | Age: 48
End: 2018-06-20

## 2018-06-20 ENCOUNTER — APPOINTMENT (OUTPATIENT)
Dept: GENERAL RADIOLOGY | Facility: HOSPITAL | Age: 48
DRG: 223 | End: 2018-06-20
Attending: INTERNAL MEDICINE
Payer: COMMERCIAL

## 2018-06-20 PROCEDURE — 0JH608Z INSERTION OF DEFIBRILLATOR GENERATOR INTO CHEST SUBCUTANEOUS TISSUE AND FASCIA, OPEN APPROACH: ICD-10-PCS | Performed by: INTERNAL MEDICINE

## 2018-06-20 PROCEDURE — B51N1ZA FLUOROSCOPY OF LEFT UPPER EXTREMITY VEINS USING LOW OSMOLAR CONTRAST, GUIDANCE: ICD-10-PCS | Performed by: INTERNAL MEDICINE

## 2018-06-20 PROCEDURE — 99232 SBSQ HOSP IP/OBS MODERATE 35: CPT | Performed by: INTERNAL MEDICINE

## 2018-06-20 PROCEDURE — 02HK3KZ INSERTION OF DEFIBRILLATOR LEAD INTO RIGHT VENTRICLE, PERCUTANEOUS APPROACH: ICD-10-PCS | Performed by: INTERNAL MEDICINE

## 2018-06-20 PROCEDURE — 71045 X-RAY EXAM CHEST 1 VIEW: CPT | Performed by: INTERNAL MEDICINE

## 2018-06-20 RX ORDER — CEFAZOLIN SODIUM/WATER 2 G/20 ML
SYRINGE (ML) INTRAVENOUS
Status: COMPLETED
Start: 2018-06-20 | End: 2018-06-20

## 2018-06-20 RX ORDER — MIDAZOLAM HYDROCHLORIDE 1 MG/ML
INJECTION INTRAMUSCULAR; INTRAVENOUS
Status: COMPLETED
Start: 2018-06-20 | End: 2018-06-20

## 2018-06-20 RX ORDER — POTASSIUM CHLORIDE 20 MEQ/1
40 TABLET, EXTENDED RELEASE ORAL EVERY 4 HOURS
Status: COMPLETED | OUTPATIENT
Start: 2018-06-20 | End: 2018-06-20

## 2018-06-20 RX ORDER — LIDOCAINE HYDROCHLORIDE 10 MG/ML
INJECTION, SOLUTION EPIDURAL; INFILTRATION; INTRACAUDAL; PERINEURAL
Status: COMPLETED
Start: 2018-06-20 | End: 2018-06-20

## 2018-06-20 RX ORDER — BACITRACIN 50000 [USP'U]/1
INJECTION, POWDER, LYOPHILIZED, FOR SOLUTION INTRAMUSCULAR
Status: COMPLETED
Start: 2018-06-20 | End: 2018-06-20

## 2018-06-20 RX ORDER — HYDROCODONE BITARTRATE AND ACETAMINOPHEN 5; 325 MG/1; MG/1
2 TABLET ORAL EVERY 6 HOURS PRN
Status: DISCONTINUED | OUTPATIENT
Start: 2018-06-20 | End: 2018-06-21

## 2018-06-20 RX ORDER — HYDROCODONE BITARTRATE AND ACETAMINOPHEN 5; 325 MG/1; MG/1
1 TABLET ORAL EVERY 6 HOURS PRN
Status: DISCONTINUED | OUTPATIENT
Start: 2018-06-20 | End: 2018-06-21

## 2018-06-20 RX ORDER — ONDANSETRON 2 MG/ML
4 INJECTION INTRAMUSCULAR; INTRAVENOUS EVERY 6 HOURS PRN
Status: DISCONTINUED | OUTPATIENT
Start: 2018-06-20 | End: 2018-06-21

## 2018-06-20 NOTE — PROCEDURES
OPERATION(S) PERFORMED:   1. Single chamber ICD implant. 2. Chest fluoroscopy.    3. Left sided venogram.     : Laura Wood MD  INDICATION:  52 y/o male with nonischemic CMP with a persistently reduced EF.  LVEF = 12-12%    COMPLICATIONS: None     EST RV lead:  Medtronic Model # [id number], Serial # [id number]     MEASURED DATA: RV lead:20mV, 0.75 v at 0.4 ms, 608 ohms. INDUCTION TESTING: No DFT testing was performed.     CONCLUSIONS:   1. Status post successful implant of a Medtronic single chamber ICD

## 2018-06-20 NOTE — PLAN OF CARE
Received alert and oriented  No complaints of pain  NSR on monitor  Lungs clear bilaterally  Cpap at night  Up ambulating independently  NPO for planned ICD this afternoon  Will monitor.

## 2018-06-20 NOTE — TELEPHONE ENCOUNTER
Called and spoke with Juliana Goetz, with [employer]. Cecilia Camejo informed that we are unable to complete pt's disability forms as it seems his leave will be managed by cardiology. Cecilia Camejo states understanding.

## 2018-06-20 NOTE — TELEPHONE ENCOUNTER
Blanca from The [address] [place] [address] called to inquire on the status of the short term disability forms that she faxed over yesterday. The form needs to be faxed back to fax# [phone], or verbal can be called in to [facility] [employer] [facility] [employer], the , at [phone].   The

## 2018-06-20 NOTE — PROGRESS NOTES
[facility] Progress Note:  Anticoagulation Weight Dose Adjustment for enoxaparin (LOVENOX)    Duke Jackson is a 53year old male who has been prescribed enoxaparin (LOVENOX) for VTE prophylaxis.       Estimated Creatinine Clearance: 98.3 mL/min (based on S

## 2018-06-20 NOTE — PROGRESS NOTES
[facility] HOSPITALIST  Progress Note     Primus Moder Patient Status:  Inpatient    1970 MRN [id number]   [facility] 2NE-A Attending Saniya Orlando MD   Hosp Day # 6 PCP Justin Moody MD     Chief Complaint: dyspnea    S: Pt had angio values in this interval not displayed. Estimated Creatinine Clearance: 111 mL/min (based on SCr of 0.93 mg/dL). No results for input(s): PTP, INR in the last 168 hours.     Recent Labs   Lab  06/13/18   1509  06/14/18   0051  06/14/18   0739   TROP

## 2018-06-21 ENCOUNTER — APPOINTMENT (OUTPATIENT)
Dept: GENERAL RADIOLOGY | Facility: HOSPITAL | Age: 48
DRG: 223 | End: 2018-06-21
Attending: INTERNAL MEDICINE
Payer: COMMERCIAL

## 2018-06-21 ENCOUNTER — TELEPHONE (OUTPATIENT)
Dept: FAMILY MEDICINE CLINIC | Facility: CLINIC | Age: 48
End: 2018-06-21

## 2018-06-21 VITALS
TEMPERATURE: 98 F | BODY MASS INDEX: 41.75 KG/M2 | RESPIRATION RATE: 18 BRPM | OXYGEN SATURATION: 97 % | HEART RATE: 68 BPM | HEIGHT: 73 IN | DIASTOLIC BLOOD PRESSURE: 67 MMHG | SYSTOLIC BLOOD PRESSURE: 107 MMHG | WEIGHT: 315 LBS

## 2018-06-21 PROCEDURE — 99239 HOSP IP/OBS DSCHRG MGMT >30: CPT | Performed by: INTERNAL MEDICINE

## 2018-06-21 PROCEDURE — 71046 X-RAY EXAM CHEST 2 VIEWS: CPT | Performed by: INTERNAL MEDICINE

## 2018-06-21 RX ORDER — HYDRALAZINE HYDROCHLORIDE 50 MG/1
50 TABLET, FILM COATED ORAL EVERY 8 HOURS SCHEDULED
Status: DISCONTINUED | OUTPATIENT
Start: 2018-06-21 | End: 2018-06-21

## 2018-06-21 RX ORDER — HYDRALAZINE HYDROCHLORIDE 50 MG/1
100 TABLET, FILM COATED ORAL EVERY 8 HOURS SCHEDULED
Status: DISCONTINUED | OUTPATIENT
Start: 2018-06-21 | End: 2018-06-21

## 2018-06-21 NOTE — TELEPHONE ENCOUNTER
Called and spoke with Lynnette Muhammad at [facility]. Lynnette Muhammad informed that I spoke with Skinny Pringle yesterday and informed her that pt is currently being managed by cardiology. Lynnette Muhammad states she will update the pt's records.

## 2018-06-21 NOTE — PROGRESS NOTES
[facility]  Cardiology Progress Note    Adali Cavazos Patient Status:  Inpatient    1970 MRN [id number]   [facility] 2NE-A Attending Matthew Graf MD   Hosp Day # 7 PCP Agustin Butts MD     Subjective:  C/o mild pain at ICD chamber ICD  · Acute on chronic systolic HF, LVEF 22-41%, PA 37/18, normal coronaries per cath. Diuresed 20 liters and 20 pounds  · HTN  · Dyslipidemia  · EVA, uses CPAP  · DM    Plan:   1.  Continue BB, ACE, luciana, hydralazine, dig, ASA, nitrate, statin  2

## 2018-06-21 NOTE — PROGRESS NOTES
Heart Failure Coordinator Progress Note    Patient revisited for HF education, discharge needs, and follow up. Pt denies any questions at this times. States \"I've got what I need, got my ICD, and have all my follow ups next week\".  Re

## 2018-06-21 NOTE — PROGRESS NOTES
Discharge instructions discussed and given. Discussed HF guidelines with pt. Pt able to teach back restrictions and when to notify MD. PULLIAM and tele d/cd.

## 2018-06-21 NOTE — TELEPHONE ENCOUNTER
Mojgan from [facility] called requesting Pts medical history, regarding his upcoming surgery, also they are requesting all of the surgery details.    Fax:[phone]  Direct p# to  Hever [place]) is [phone]

## 2018-06-21 NOTE — DISCHARGE SUMMARY
[facility] [place] HOSPITALIST  DISCHARGE SUMMARY     Jeannette Penn Patient Status:  Inpatient    1970 MRN [id number]   [facility] 2NE-A Attending No att. providers found   Hosp Day # 7 PCP Avery Huerta MD     Date of Admission: 2018  Date Wheezing. Quantity:  1 Box  Refills:  0     aspirin 81 MG Chew      Chew 1 tablet (81 mg total) by mouth daily.    Quantity:  30 tablet  Refills:  0     benzonatate 200 MG Caps  Commonly known as:  TESSALON      Take 1 capsule (200 mg total) by mouth 3 (t 40864  [phone]    Go on 7/3/2018  Office visit with Dr. Cate Wolf on July 3, 2018 at 10:30 am    Summer Jones MD  [address] [phone]    On 6/28/2018   ICD check at 8:30 am     M

## 2018-06-21 NOTE — PROGRESS NOTES
[facility] HOSPITALIST  Progress Note     Renetta Alcala Patient Status:  Inpatient    1970 MRN [id number]   [facility] 2NE-A Attending Shaina Mcintosh MD   Hosp Day # 7 PCP Can De León MD     Chief Complaint: dyspnea    S: Pt had ICD p 40 mg Oral TID   • enoxaparin  0.5 mg/kg Subcutaneous Nightly   • carvedilol  25 mg Oral BID with meals   • Enalapril Maleate  20 mg Oral Daily   • spironolactone  25 mg Oral Daily   • digoxin  250 mcg Oral Daily   • cetirizine  10 mg Oral Daily   • aspiri

## 2018-06-21 NOTE — PLAN OF CARE
CARDIOVASCULAR - ADULT    • Maintains optimal cardiac output and hemodynamic stability Progressing    • Absence of cardiac arrhythmias or at baseline Progressing    NSR w/ 1st degree heart block on telemetry. VSS. Right groin site open to air, soft.   Lef

## 2018-06-22 ENCOUNTER — PATIENT OUTREACH (OUTPATIENT)
Dept: CASE MANAGEMENT | Age: 48
End: 2018-06-22

## 2018-06-22 DIAGNOSIS — I50.9 ACUTE ON CHRONIC CONGESTIVE HEART FAILURE, UNSPECIFIED HEART FAILURE TYPE (HCC): ICD-10-CM

## 2018-06-22 DIAGNOSIS — I42.8 NON-ISCHEMIC CARDIOMYOPATHY (HCC): ICD-10-CM

## 2018-06-25 NOTE — PROGRESS NOTES
Initial Post Discharge Follow Up   Discharge Date: 6/21/18  Contact Date: 6/22/2018    Consent Verification:  Assessment Completed With: Patient  HIPAA Verified?   Yes    Discharge Dx:    Non-ischemic cardiomyopathy, Acute systolic HF EF 48%  Procedures: Prescriptions:  Rosuvastatin Calcium 10 MG Oral Tab Take 1 tablet (10 mg total) by mouth nightly.  Disp: 30 tablet Rfl: 2   Blood Pressure Monitoring (BLOOD PRESSURE KIT) Does not apply Kit Monitor blood pressure 1-2 times a day Dx: Hypertension Disp: 1 kit Yes   What services:   CHF clinic     DX: specifics:  Have you been experiencing any symptoms since you returned home from the hospital?  No  Any shortness of breath?   no  Did you have a procedure (cardiac cath or angiogram) while in the hospital? yes    I just inside the entrance to the hospital floor. It's across from the main desk you see as soon as you enter into the unit.     Aug 06, 2018 12:40 PM CDT Exam - Established Patient with Benedicto Arellano MD [address] [address], [facility], [place] Sydell Aase Me

## 2018-06-26 ENCOUNTER — HOSPITAL ENCOUNTER (OUTPATIENT)
Dept: CARDIOLOGY CLINIC | Facility: HOSPITAL | Age: 48
Discharge: HOME | End: 2018-06-26
Attending: NURSE PRACTITIONER
Payer: COMMERCIAL

## 2018-06-26 VITALS
DIASTOLIC BLOOD PRESSURE: 69 MMHG | OXYGEN SATURATION: 97 % | SYSTOLIC BLOOD PRESSURE: 119 MMHG | RESPIRATION RATE: 19 BRPM | HEART RATE: 72 BPM | WEIGHT: 315 LBS | BODY MASS INDEX: 45 KG/M2

## 2018-06-26 DIAGNOSIS — E11.69 DIABETES MELLITUS TYPE 2 IN OBESE (HCC): ICD-10-CM

## 2018-06-26 DIAGNOSIS — I42.8 NON-ISCHEMIC CARDIOMYOPATHY (HCC): ICD-10-CM

## 2018-06-26 DIAGNOSIS — I10 BENIGN ESSENTIAL HTN: ICD-10-CM

## 2018-06-26 DIAGNOSIS — E66.9 DIABETES MELLITUS TYPE 2 IN OBESE (HCC): ICD-10-CM

## 2018-06-26 DIAGNOSIS — G47.33 OSA ON CPAP: ICD-10-CM

## 2018-06-26 DIAGNOSIS — Z99.89 OSA ON CPAP: ICD-10-CM

## 2018-06-26 DIAGNOSIS — I50.22 CHRONIC SYSTOLIC HEART FAILURE (HCC): Primary | ICD-10-CM

## 2018-06-26 DIAGNOSIS — E66.01 MORBID OBESITY WITH BMI OF 45.0-49.9, ADULT (HCC): ICD-10-CM

## 2018-06-26 DIAGNOSIS — Z95.810 ICD (IMPLANTABLE CARDIOVERTER-DEFIBRILLATOR) IN PLACE: ICD-10-CM

## 2018-06-26 PROCEDURE — 83880 ASSAY OF NATRIURETIC PEPTIDE: CPT | Performed by: NURSE PRACTITIONER

## 2018-06-26 PROCEDURE — 99213 OFFICE O/P EST LOW 20 MIN: CPT

## 2018-06-26 PROCEDURE — 80162 ASSAY OF DIGOXIN TOTAL: CPT | Performed by: NURSE PRACTITIONER

## 2018-06-26 PROCEDURE — 36415 COLL VENOUS BLD VENIPUNCTURE: CPT

## 2018-06-26 PROCEDURE — 94618 PULMONARY STRESS TESTING: CPT

## 2018-06-26 PROCEDURE — 80048 BASIC METABOLIC PNL TOTAL CA: CPT | Performed by: NURSE PRACTITIONER

## 2018-06-26 PROCEDURE — 99214 OFFICE O/P EST MOD 30 MIN: CPT | Performed by: NURSE PRACTITIONER

## 2018-06-26 PROCEDURE — 99215 OFFICE O/P EST HI 40 MIN: CPT | Performed by: NURSE PRACTITIONER

## 2018-06-26 RX ORDER — SPIRONOLACTONE 25 MG/1
50 TABLET ORAL DAILY
Qty: 60 TABLET | Refills: 3 | Status: SHIPPED | OUTPATIENT
Start: 2018-06-26 | End: 2020-01-23 | Stop reason: CLARIF

## 2018-06-26 RX ORDER — TORSEMIDE 20 MG/1
TABLET ORAL
Qty: 90 TABLET | Refills: 4 | Status: SHIPPED | OUTPATIENT
Start: 2018-06-26 | End: 2018-07-16

## 2018-06-26 NOTE — PROGRESS NOTES
[facility]  Heart Failure Clinic Progress Note    Nima Whitley is a 53year old male who presents to clinic at the request of Dr. Santo Alves for APN assessment and management of chronic systolic heart failure and is functional class 3.   Patient has a his canned vegetables. He is not weighing himself daily, but went shopping recently and waist is now a size 46, was previously 50 inches. His scale broke, but he is planning to get a new one. He has no edema or abdominal bloating.  He is sleeping supine in bed hypokinesis. 2. Left atrium: The left atrium was mildly to moderately dilated. 3. Right ventricle: Systolic pressure was moderately increased.   4. Pulmonary arteries: Systolic pressure was moderately increased, estimated     to be 52mm Hg.    6/19/2018 study  · Eventual Cardiac rehab, he prefers to hold off at this time  · Device clinic follow up this Thursday. · Get a scale.      I spent greater than 50% of the 80 minute visit with this patient providing counseling, coordination of care and education r

## 2018-06-26 NOTE — PROGRESS NOTES
RN [facility]. RN patient a \"new patient\" folder and reviewed in detail. Patient assessed. Pt denies SOB and edema. Pt says he is sleeping flat in bed and is \"feeling the best\" he has felt \"in a long time\".  Pt

## 2018-06-27 ENCOUNTER — TELEPHONE (OUTPATIENT)
Dept: CARDIOLOGY UNIT | Facility: HOSPITAL | Age: 48
End: 2018-06-27

## 2018-06-27 NOTE — PAYOR COMM NOTE
--------------  DISCHARGE REVIEW    Payor: [employer]  Subscriber #:  [id number]  Authorization Number: [id number]    Admit date: 6/14/18  Admit time:  1103  Discharge Date: 6/21/2018  2:17 PM     Admitting Physician: Gil Abraham MD  Attending Vin Coombs Wheezing. Quantity:  1 Box  Refills:  0     aspirin 81 MG Chew      Chew 1 tablet (81 mg total) by mouth daily.    Quantity:  30 tablet  Refills:  0     benzonatate 200 MG Caps  Commonly known as:  TESSALON      Take 1 capsule (200 mg total) by mouth 3 (t 62091  [phone]  Go on 7/3/2018  Office visit with Dr. Lorri Hernández on July 3, 2018 at 10:30 am    Tran Cifuentes MD  [address] [place] [address] [phone]  On 6/28/2018  ICD check at 8:30 am     Mya De Paz

## 2018-06-27 NOTE — PAYOR COMM NOTE
--------------  CONTINUED STAY REVIEW    Payor: [employer] P[employer]  Subscriber #:  [id number]  Authorization Number: [id number]    Admit date: 6/14/18  Admit time: 1103    Admitting Physician: Vin Butler MD  Attending Physician:  No att. providers found  Prim 06/15/18   2108  06/16/18   0504  06/17/18   0712   GLU  165*  146*   --   164*  142*   BUN  18  20   --   26*  28*   CREATSERUM  1.25  1.17   --   1.24  1.06   GFRAA  79  85   --   80  96   GFRNAA  68  74   --   69  83   CA  8.2*  9.4   --   9.0  9.3   AL chemical nuclear stress few years ago. Heart failure - not new, The patient could not be done in cath lab tonight due to late time. He seems to be overdiuresed with borderline hypotension.  He wants procedures done here cardiac cath and ICD before discharge

## 2018-06-27 NOTE — PROGRESS NOTES
Spoke with pt to verify [employer] coverage and co-pay. Remington Quintana spoke with Karen lombardi this morning regarding his co-pay cost, co-pay card to be mailed to him in the next 7-10 days, and financial paperwork that pt needs to fill out and send back to Karen Giang.  [facility] [phone]

## 2018-06-28 ENCOUNTER — PRIOR ORIGINAL RECORDS (OUTPATIENT)
Dept: OTHER | Age: 48
End: 2018-06-28

## 2018-06-29 ENCOUNTER — OFFICE VISIT (OUTPATIENT)
Dept: FAMILY MEDICINE CLINIC | Facility: CLINIC | Age: 48
End: 2018-06-29

## 2018-06-29 VITALS
OXYGEN SATURATION: 96 % | SYSTOLIC BLOOD PRESSURE: 120 MMHG | DIASTOLIC BLOOD PRESSURE: 78 MMHG | RESPIRATION RATE: 18 BRPM | BODY MASS INDEX: 44 KG/M2 | WEIGHT: 315 LBS | HEART RATE: 81 BPM

## 2018-06-29 DIAGNOSIS — I42.8 NON-ISCHEMIC CARDIOMYOPATHY (HCC): Primary | ICD-10-CM

## 2018-06-29 DIAGNOSIS — E66.01 MORBID OBESITY WITH BMI OF 45.0-49.9, ADULT (HCC): ICD-10-CM

## 2018-06-29 DIAGNOSIS — S30.1XXA HEMATOMA OF GROIN, INITIAL ENCOUNTER: ICD-10-CM

## 2018-06-29 DIAGNOSIS — E11.9 TYPE 2 DIABETES MELLITUS WITHOUT COMPLICATION, WITHOUT LONG-TERM CURRENT USE OF INSULIN (HCC): Chronic | ICD-10-CM

## 2018-06-29 DIAGNOSIS — I50.9 ACUTE ON CHRONIC CONGESTIVE HEART FAILURE, UNSPECIFIED HEART FAILURE TYPE (HCC): ICD-10-CM

## 2018-06-29 PROCEDURE — 99496 TRANSJ CARE MGMT HIGH F2F 7D: CPT | Performed by: EMERGENCY MEDICINE

## 2018-06-29 PROCEDURE — 1111F DSCHRG MED/CURRENT MED MERGE: CPT | Performed by: EMERGENCY MEDICINE

## 2018-06-29 NOTE — PAYOR COMM NOTE
--------------  CONTINUED STAY REVIEW    Payor: [facility] PPO  Subscriber #:  [id number]  Authorization Number: [id number]    Admit date: 6/14/18  Admit time: 1103    Admitting Physician: Apple Segura MD  Attending Physician:  No att. providers found  Prim for tomorrow and then EP will see the patient for ICD implant as specified prior to the weekend. Hold loop diuretic today.  Pt will follow with Dr. Kusum Mcwilliams as already outlined.     D/w pt and the Nurses   Bowen Cardona / JACI  06/18/2018 6/19/18:   Assess and right-sided pressures were measured. His RA had a mean of 4, pulmonary RV was 37/8, PA was 37/18, pulmonary capillary wedge pressure was mean of 11, pulmonary artery saturation was 56%.     PA catheter was removed.   The JL4 catheter was advanced over fentanyl during continuous ECG, pulse oximetry, and noninvasive hemodynamic monitoring from 1441 to 1530.      METHODS: The patient was brought to the outpatient cardiac telemetry unit in a fasting and nonsedated state after providing informed consent.  IV performed.     Mercedez Garza MD  [facility]    MEDICATIONS ADMINISTERED IN LAST 1 DAY:  HYDROcodone-acetaminophen (NORCO) 5-325 MG per tab 1 tablet Order Report Every 6 hours PRN 06/20/18 06/21/18   HYDROcodone-acetaminophen (Annabelle Lima) Kurt Mondragon Chloride ER (K-DUR M20) CR tab 40 mEq Order Report Every 4 hours 06/17/18 06/17/18   0.9%  NaCl infusion Order Report Continuous 06/18/18 06/21/18   aspirin chewable tab 324 mg Order Report Once 06/18/18 06/18/18       FOR CONTINUED REVIEW/APPROVAL OF INPT

## 2018-06-29 NOTE — PROGRESS NOTES
HPI:    Zahra Aguilar is a 53year old male here today for hospital follow up.    He was discharged from Inpatient hospital, [facility] to Home   Admit Date: 6/13/2018  Discharge Date: 6/21/2018  Hospital Discharge Diagnosis:  Non ischemic cardiomyopathy Blood Pressure Monitoring (BLOOD PRESSURE KIT) Does not apply Kit Monitor blood pressure 1-2 times a day Dx: Hypertension   aspirin 81 MG Oral Chew Tab Chew 1 tablet (81 mg total) by mouth daily.    isosorbide dinitrate 20 MG Oral Tab Take 1 tablet (20 mg vision  HEENT: denies nasal congestion, sinus pain or ST  LUNGS: denies shortness of breath with exertion  CARDIOVASCULAR: denies chest pain on exertion or palpitations  GI: denies abdominal pain, denies heartburn, denies diarrhea  MUSCULOSKELETAL: denies weight loss. Will refer to weight loss clinic. States that he is able to do this at this time. Encouraged to follow-up with weight loss clinic when ready. Hematoma of groin, initial encounter   Patient reassured advised warm compresses to the area.

## 2018-06-29 NOTE — PATIENT INSTRUCTIONS
Thank you for choosing [address] [place] [address] Group  To Do:  FOR RICKIE STROUD  1. Follow up with weight loss clinic when ready  2. Follow up with cardiology on Tuesday  3. Arrange for Dm eye exam, Dr. Arlen Gill  4. Follow up in 3 months  5.  Warm compresses to right you are prescribed. · Antiarrhythmics may be used to control a fast or irregular heartbeat. · Beta-blockers slow the heart rate and lower blood pressure, which lessens the work the heart has to do.  They may also help keep the heartbeat regular and enhanc side effects. Side effects may include nausea, dry cough, dizziness, muscle cramps, or changes in your heartbeat. If you have any of these or other symptoms that bother you after starting a medication, tell your doctor right away.  Your doctor may be able t and desserts  · Yogurt  · Unsalted, air-popped popcorn  · Unsalted nuts or seeds  Stay away from:  · Pies and cakes  · Packaged dessert mixes  · Pizza  · Canned and packaged puddings  · Pretzels, chips, crackers, and nuts—unless the label says unsalted  Da

## 2018-06-30 PROBLEM — S30.1XXA HEMATOMA OF GROIN: Status: ACTIVE | Noted: 2018-06-30

## 2018-07-03 ENCOUNTER — PRIOR ORIGINAL RECORDS (OUTPATIENT)
Dept: OTHER | Age: 48
End: 2018-07-03

## 2018-07-16 ENCOUNTER — HOSPITAL ENCOUNTER (OUTPATIENT)
Dept: LAB | Facility: HOSPITAL | Age: 48
Discharge: HOME | End: 2018-07-16
Attending: NURSE PRACTITIONER
Payer: COMMERCIAL

## 2018-07-16 ENCOUNTER — HOSPITAL ENCOUNTER (OUTPATIENT)
Dept: CARDIOLOGY CLINIC | Facility: HOSPITAL | Age: 48
Discharge: HOME | End: 2018-07-16
Attending: NURSE PRACTITIONER
Payer: COMMERCIAL

## 2018-07-16 VITALS
BODY MASS INDEX: 44 KG/M2 | DIASTOLIC BLOOD PRESSURE: 71 MMHG | OXYGEN SATURATION: 94 % | HEART RATE: 77 BPM | WEIGHT: 315 LBS | RESPIRATION RATE: 23 BRPM | SYSTOLIC BLOOD PRESSURE: 118 MMHG

## 2018-07-16 DIAGNOSIS — I50.9 CHF (CONGESTIVE HEART FAILURE) (HCC): ICD-10-CM

## 2018-07-16 LAB
BUN BLD-MCNC: 18 MG/DL (ref 8–20)
CALCIUM BLD-MCNC: 8.5 MG/DL (ref 8.3–10.3)
CHLORIDE: 103 MMOL/L (ref 101–111)
CO2: 29 MMOL/L (ref 22–32)
CREAT BLD-MCNC: 1.21 MG/DL (ref 0.7–1.3)
GLUCOSE BLD-MCNC: 224 MG/DL (ref 70–99)
POTASSIUM SERPL-SCNC: 3.3 MMOL/L (ref 3.6–5.1)
SODIUM SERPL-SCNC: 140 MMOL/L (ref 136–144)

## 2018-07-16 PROCEDURE — 99212 OFFICE O/P EST SF 10 MIN: CPT

## 2018-07-16 PROCEDURE — 36415 COLL VENOUS BLD VENIPUNCTURE: CPT | Performed by: NURSE PRACTITIONER

## 2018-07-16 PROCEDURE — 80048 BASIC METABOLIC PNL TOTAL CA: CPT | Performed by: NURSE PRACTITIONER

## 2018-07-16 RX ORDER — TORSEMIDE 20 MG/1
20 TABLET ORAL 2 TIMES DAILY
Qty: 90 TABLET | Refills: 4 | Status: SHIPPED | COMMUNITY
Start: 2018-07-16 | End: 2019-10-28

## 2018-07-16 RX ORDER — DIGOXIN 250 MCG
0.25 TABLET ORAL NIGHTLY
Refills: 0 | Status: SHIPPED | COMMUNITY
Start: 2018-07-16 | End: 2020-03-15

## 2018-07-16 NOTE — PROGRESS NOTES
Pt. Assessed. No s/s of shortness of breath, fatigue, chest pain or edema noted. Weight stable at _337.4____ lbs. Pt. Pennie Backers yesterday and a l;ittle bit this morning. Dr. Jahaira Leach notified.  Reviewed current list of patient's allergies and medication;

## 2018-07-16 NOTE — PROGRESS NOTES
[facility]  Advanced Heart Failure Progress Note    Uche Ballard Patient Status:  Outpatient    1970 MRN [id number]   Location [facility] Attending MITCHELL Serrato   Hosp Day # 0 PCP Alice Devries MD     Subjective heart failure (HCC)     Type 2 diabetes mellitus without complication, without long-term current use of insulin (HCC)     Benign essential HTN     EVA on CPAP     Morbid obesity with BMI of 45.0-49.9, adult ([address]     Lipoma of left upper extremity     Seba

## 2018-07-18 ENCOUNTER — TELEPHONE (OUTPATIENT)
Dept: CARDIOLOGY CLINIC | Facility: HOSPITAL | Age: 48
End: 2018-07-18

## 2018-07-19 ENCOUNTER — TELEPHONE (OUTPATIENT)
Dept: CARDIOLOGY CLINIC | Facility: HOSPITAL | Age: 48
End: 2018-07-19

## 2018-07-19 ENCOUNTER — TELEPHONE (OUTPATIENT)
Dept: FAMILY MEDICINE CLINIC | Facility: CLINIC | Age: 48
End: 2018-07-19

## 2018-07-19 NOTE — TELEPHONE ENCOUNTER
Pt states he saw a [place]ologist at [place]. Pt is  taking Torsenive and spiralacodine, digoxen. Pt states that his dosing had changed and since then he feels that he has a uti. Offered pt an appt today pt declined. Pls call pt.

## 2018-07-19 NOTE — TELEPHONE ENCOUNTER
Called and left detailed message of below on pt's vm. Ok per payever. Pt advised to call the office back with any questions.

## 2018-07-19 NOTE — PROGRESS NOTES
Patient called [facility] on return call. \"Woozziness\" has resolved  A little. Patient states not as drastic. [facility] note indicates and uptitrate of [facility] [place] will follow up with APN on this. No other questions.

## 2018-07-19 NOTE — TELEPHONE ENCOUNTER
Called and spoke with pt. Pt states that he was seen be cardiology and started on torsemide 20mg and digoxin 0.25mg.  Pt states since changing the medication he has been experiencing urgency to urinate however since yesterday he has been experiencing urgenc

## 2018-07-29 ENCOUNTER — APPOINTMENT (OUTPATIENT)
Dept: GENERAL RADIOLOGY | Age: 48
DRG: 690 | End: 2018-07-29
Attending: EMERGENCY MEDICINE
Payer: COMMERCIAL

## 2018-07-29 ENCOUNTER — HOSPITAL ENCOUNTER (INPATIENT)
Facility: HOSPITAL | Age: 48
LOS: 3 days | Discharge: HOME | DRG: 690 | End: 2018-08-01
Attending: EMERGENCY MEDICINE | Admitting: STUDENT IN AN ORGANIZED HEALTH CARE EDUCATION/TRAINING PROGRAM
Payer: COMMERCIAL

## 2018-07-29 DIAGNOSIS — R77.8 ELEVATED TROPONIN: ICD-10-CM

## 2018-07-29 DIAGNOSIS — R50.9 ACUTE FEBRILE ILLNESS: ICD-10-CM

## 2018-07-29 DIAGNOSIS — N39.0 URINARY TRACT INFECTION WITHOUT HEMATURIA, SITE UNSPECIFIED: Primary | ICD-10-CM

## 2018-07-29 LAB
ALBUMIN SERPL-MCNC: 3.2 G/DL (ref 3.5–4.8)
ALBUMIN/GLOB SERPL: 0.7 {RATIO} (ref 1–2)
ALP LIVER SERPL-CCNC: 49 U/L (ref 45–117)
ALT SERPL-CCNC: 23 U/L (ref 17–63)
ANION GAP SERPL CALC-SCNC: 8 MMOL/L (ref 0–18)
APTT PPP: 35.2 SECONDS (ref 26.1–34.6)
AST SERPL-CCNC: 30 U/L (ref 15–41)
BASOPHILS # BLD AUTO: 0.03 X10(3) UL (ref 0–0.1)
BASOPHILS NFR BLD AUTO: 0.3 %
BILIRUB SERPL-MCNC: 1.8 MG/DL (ref 0.1–2)
BILIRUB UR QL STRIP.AUTO: NEGATIVE
BUN BLD-MCNC: 18 MG/DL (ref 8–20)
BUN/CREAT SERPL: 14 (ref 10–20)
CALCIUM BLD-MCNC: 8.6 MG/DL (ref 8.3–10.3)
CHLORIDE SERPL-SCNC: 98 MMOL/L (ref 101–111)
CO2 SERPL-SCNC: 28 MMOL/L (ref 22–32)
COLOR UR AUTO: YELLOW
CREAT BLD-MCNC: 1.29 MG/DL (ref 0.7–1.3)
EOSINOPHIL # BLD AUTO: 0 X10(3) UL (ref 0–0.3)
EOSINOPHIL NFR BLD AUTO: 0 %
ERYTHROCYTE [DISTWIDTH] IN BLOOD BY AUTOMATED COUNT: 15.7 % (ref 11.5–16)
GLOBULIN PLAS-MCNC: 4.5 G/DL (ref 2.5–3.7)
GLUCOSE BLD-MCNC: 188 MG/DL (ref 65–99)
GLUCOSE BLD-MCNC: 188 MG/DL (ref 70–99)
GLUCOSE BLD-MCNC: 200 MG/DL (ref 65–99)
GLUCOSE UR STRIP.AUTO-MCNC: NEGATIVE MG/DL
GRAN CASTS #/AREA URNS LPF: PRESENT /LPF
HCT VFR BLD AUTO: 38 % (ref 37–53)
HGB BLD-MCNC: 13.1 G/DL (ref 13–17)
HYALINE CASTS #/AREA URNS AUTO: PRESENT /LPF
IMMATURE GRANULOCYTE COUNT: 0.07 X10(3) UL (ref 0–1)
IMMATURE GRANULOCYTE RATIO %: 0.7 %
INR BLD: 1.27 (ref 0.92–1.08)
KETONES UR STRIP.AUTO-MCNC: NEGATIVE MG/DL
LACTIC ACID: 1.6 MMOL/L (ref 0.5–2)
LEUKOCYTE ESTERASE UR QL STRIP.AUTO: NEGATIVE
LYMPHOCYTES # BLD AUTO: 0.8 X10(3) UL (ref 0.9–4)
LYMPHOCYTES NFR BLD AUTO: 8.2 %
M PROTEIN MFR SERPL ELPH: 7.7 G/DL (ref 6.1–8.3)
MCH RBC QN AUTO: 26.4 PG (ref 27–33.2)
MCHC RBC AUTO-ENTMCNC: 34.5 G/DL (ref 31–37)
MCV RBC AUTO: 76.6 FL (ref 80–99)
MONOCYTES # BLD AUTO: 0.4 X10(3) UL (ref 0.1–1)
MONOCYTES NFR BLD AUTO: 4.1 %
NEUTROPHIL ABS PRELIM: 8.5 X10 (3) UL (ref 1.3–6.7)
NEUTROPHILS # BLD AUTO: 8.5 X10(3) UL (ref 1.3–6.7)
NEUTROPHILS NFR BLD AUTO: 86.7 %
NITRITE UR QL STRIP.AUTO: POSITIVE
OSMOLALITY SERPL CALC.SUM OF ELEC: 285 MOSM/KG (ref 275–295)
PH UR STRIP.AUTO: 5.5 [PH] (ref 4.5–8)
PLATELET # BLD AUTO: 199 10(3)UL (ref 150–450)
POTASSIUM SERPL-SCNC: 3.5 MMOL/L (ref 3.6–5.1)
PRO-BETA NATRIURETIC PEPTIDE: 1299 PG/ML (ref ?–125)
PSA SERPL DL<=0.01 NG/ML-MCNC: 16 SECONDS (ref 12.5–14.1)
RBC # BLD AUTO: 4.96 X10(6)UL (ref 4.3–5.7)
RED CELL DISTRIBUTION WIDTH-SD: 43 FL (ref 35.1–46.3)
SODIUM SERPL-SCNC: 134 MMOL/L (ref 136–144)
SP GR UR STRIP.AUTO: 1.02 (ref 1–1.03)
TROPONIN I SERPL-MCNC: 0.06 NG/ML (ref ?–0.05)
UROBILINOGEN UR STRIP.AUTO-MCNC: 1 MG/DL
WBC # BLD AUTO: 9.8 X10(3) UL (ref 4–13)

## 2018-07-29 PROCEDURE — 71045 X-RAY EXAM CHEST 1 VIEW: CPT | Performed by: EMERGENCY MEDICINE

## 2018-07-29 PROCEDURE — 99223 1ST HOSP IP/OBS HIGH 75: CPT | Performed by: HOSPITALIST

## 2018-07-29 PROCEDURE — 5A09357 ASSISTANCE WITH RESPIRATORY VENTILATION, LESS THAN 24 CONSECUTIVE HOURS, CONTINUOUS POSITIVE AIRWAY PRESSURE: ICD-10-PCS | Performed by: HOSPITALIST

## 2018-07-29 RX ORDER — METOCLOPRAMIDE HYDROCHLORIDE 5 MG/ML
10 INJECTION INTRAMUSCULAR; INTRAVENOUS EVERY 8 HOURS PRN
Status: DISCONTINUED | OUTPATIENT
Start: 2018-07-29 | End: 2018-08-01

## 2018-07-29 RX ORDER — ISOSORBIDE DINITRATE 20 MG/1
20 TABLET ORAL 3 TIMES DAILY
Status: DISCONTINUED | OUTPATIENT
Start: 2018-07-29 | End: 2018-08-01

## 2018-07-29 RX ORDER — ACETAMINOPHEN 500 MG
1000 TABLET ORAL ONCE
Status: COMPLETED | OUTPATIENT
Start: 2018-07-29 | End: 2018-07-29

## 2018-07-29 RX ORDER — ONDANSETRON 2 MG/ML
4 INJECTION INTRAMUSCULAR; INTRAVENOUS EVERY 6 HOURS PRN
Status: DISCONTINUED | OUTPATIENT
Start: 2018-07-29 | End: 2018-08-01

## 2018-07-29 RX ORDER — ENOXAPARIN SODIUM 100 MG/ML
75 INJECTION SUBCUTANEOUS NIGHTLY
Status: DISCONTINUED | OUTPATIENT
Start: 2018-07-29 | End: 2018-08-01

## 2018-07-29 RX ORDER — ALBUTEROL SULFATE 2.5 MG/3ML
2.5 SOLUTION RESPIRATORY (INHALATION) EVERY 6 HOURS PRN
Status: DISCONTINUED | OUTPATIENT
Start: 2018-07-29 | End: 2018-08-01

## 2018-07-29 RX ORDER — ACETAMINOPHEN 325 MG/1
650 TABLET ORAL EVERY 6 HOURS PRN
Status: DISCONTINUED | OUTPATIENT
Start: 2018-07-29 | End: 2018-08-01

## 2018-07-29 RX ORDER — SPIRONOLACTONE 25 MG/1
25 TABLET ORAL 2 TIMES DAILY
Status: DISCONTINUED | OUTPATIENT
Start: 2018-07-29 | End: 2018-07-30

## 2018-07-29 RX ORDER — HYDRALAZINE HYDROCHLORIDE 50 MG/1
100 TABLET, FILM COATED ORAL 3 TIMES DAILY
Status: DISCONTINUED | OUTPATIENT
Start: 2018-07-29 | End: 2018-08-01

## 2018-07-29 RX ORDER — SODIUM CHLORIDE 9 MG/ML
INJECTION, SOLUTION INTRAVENOUS ONCE
Status: COMPLETED | OUTPATIENT
Start: 2018-07-29 | End: 2018-07-29

## 2018-07-29 RX ORDER — DIGOXIN 125 MCG
125 TABLET ORAL DAILY
Status: DISCONTINUED | OUTPATIENT
Start: 2018-07-29 | End: 2018-08-01

## 2018-07-29 RX ORDER — POTASSIUM CHLORIDE 20 MEQ/1
40 TABLET, EXTENDED RELEASE ORAL EVERY 4 HOURS
Status: COMPLETED | OUTPATIENT
Start: 2018-07-29 | End: 2018-07-29

## 2018-07-29 RX ORDER — ASPIRIN 81 MG/1
81 TABLET, CHEWABLE ORAL DAILY
Status: DISCONTINUED | OUTPATIENT
Start: 2018-07-30 | End: 2018-08-01

## 2018-07-29 RX ORDER — TORSEMIDE 20 MG/1
20 TABLET ORAL 2 TIMES DAILY
Status: DISCONTINUED | OUTPATIENT
Start: 2018-07-29 | End: 2018-07-30

## 2018-07-29 RX ORDER — CARVEDILOL 12.5 MG/1
25 TABLET ORAL 2 TIMES DAILY WITH MEALS
Status: DISCONTINUED | OUTPATIENT
Start: 2018-07-29 | End: 2018-08-01

## 2018-07-29 RX ORDER — ROSUVASTATIN CALCIUM 10 MG/1
10 TABLET, COATED ORAL NIGHTLY
Status: DISCONTINUED | OUTPATIENT
Start: 2018-07-29 | End: 2018-08-01

## 2018-07-29 RX ORDER — ASPIRIN 81 MG/1
324 TABLET, CHEWABLE ORAL ONCE
Status: COMPLETED | OUTPATIENT
Start: 2018-07-29 | End: 2018-07-29

## 2018-07-29 RX ORDER — DEXTROSE MONOHYDRATE 25 G/50ML
50 INJECTION, SOLUTION INTRAVENOUS
Status: DISCONTINUED | OUTPATIENT
Start: 2018-07-29 | End: 2018-08-01

## 2018-07-29 RX ORDER — IBUPROFEN 400 MG/1
400 TABLET ORAL EVERY 6 HOURS PRN
Status: DISCONTINUED | OUTPATIENT
Start: 2018-07-29 | End: 2018-08-01

## 2018-07-29 NOTE — ED PROVIDER NOTES
Patient Seen in: THE [facility] Emergency Department In [place]    History   Patient presents with:  Dyspnea FLORIDALMA SOB (respiratory)  Dizziness (neurologic)    Stated Complaint:     HPI     43-year-old male presents for evaluation of weakness.   Patient has felt auscultation bilaterally. Heart: Regular rate and rhythm. Abdomen: Soft, nontender. Skin: No rash. No edema. Neurologic: No focal neurologic deficits. Normal speech pattern  Musculoskeletal: No tenderness or deformity noted.          ED Course     La DIFFERENTIAL[[id number]]          Abnormal            Final result                 Please view results for these tests on the individual orders.    URINE MICROSCOPIC W REFLEX CULTURE   BLOOD CULTURE   BLOOD CULTURE   URINE CULTURE, ROUTINE     EKG    Rate, i

## 2018-07-29 NOTE — HISTORICAL OFFICE NOTE
RICKIE Dustyramya Caro  [phone]  : 1970  ACCOUNT:  [id number]  PCP:   CARDIOLOGIST:   [facility]: [facility]  Admitted: 2018  Discharged:  2018    DISCHARGE DIAGNOSIS:   1. Status post single chamber ICD  2.  Acute on chronic systolic heart failure wit 40mg TID, Hydralazine 100mg TID, Isordil 20mg TID, Glucophage, Crestor 10mg HS, Aldactone 25mg daily, Norvasc was discontinued during this admission.      MITCHELL Chan MS/radha; DD 6/21/2018; DT: 7/5/2018; Job ID: [id number];     Noah Aguilar  [phone] not really high, compensated, and already on good meds from his previous cardiology visits. 4. Uncontrolled HTN - stated that compliant with meds. 5. Morbid Obesity - BMI of 45.0-49.9.  6. Sleep apnea - uses CPAP at home. 7. h/o smoking til 2014.    8. D

## 2018-07-30 ENCOUNTER — APPOINTMENT (OUTPATIENT)
Dept: CT IMAGING | Facility: HOSPITAL | Age: 48
DRG: 690 | End: 2018-07-30
Attending: HOSPITALIST
Payer: COMMERCIAL

## 2018-07-30 ENCOUNTER — HOSPITAL ENCOUNTER (OUTPATIENT)
Dept: CARDIOLOGY CLINIC | Facility: HOSPITAL | Age: 48
End: 2018-07-30
Attending: NURSE PRACTITIONER
Payer: COMMERCIAL

## 2018-07-30 LAB
ADENOVIRUS PCR:: NEGATIVE
ANION GAP SERPL CALC-SCNC: 7 MMOL/L (ref 0–18)
ATRIAL RATE: 118 BPM
B PERT DNA SPEC QL NAA+PROBE: NEGATIVE
BASOPHILS # BLD AUTO: 0.02 X10(3) UL (ref 0–0.1)
BASOPHILS NFR BLD AUTO: 0.2 %
BILIRUB UR QL STRIP.AUTO: NEGATIVE
BILIRUB UR QL STRIP.AUTO: NEGATIVE
BUN BLD-MCNC: 24 MG/DL (ref 8–20)
BUN/CREAT SERPL: 16.9 (ref 10–20)
C PNEUM DNA SPEC QL NAA+PROBE: NEGATIVE
CALCIUM BLD-MCNC: 8 MG/DL (ref 8.3–10.3)
CHLORIDE SERPL-SCNC: 100 MMOL/L (ref 101–111)
CO2 SERPL-SCNC: 29 MMOL/L (ref 22–32)
CORONAVIRUS 229E PCR:: NEGATIVE
CORONAVIRUS HKU1 PCR:: NEGATIVE
CORONAVIRUS NL63 PCR:: NEGATIVE
CORONAVIRUS OC43 PCR:: NEGATIVE
CREAT BLD-MCNC: 1.42 MG/DL (ref 0.7–1.3)
EOSINOPHIL # BLD AUTO: 0 X10(3) UL (ref 0–0.3)
EOSINOPHIL NFR BLD AUTO: 0 %
ERYTHROCYTE [DISTWIDTH] IN BLOOD BY AUTOMATED COUNT: 15.3 % (ref 11.5–16)
EST. AVERAGE GLUCOSE BLD GHB EST-MCNC: 166 MG/DL (ref 68–126)
FLUAV RNA SPEC QL NAA+PROBE: NEGATIVE
FLUBV RNA SPEC QL NAA+PROBE: NEGATIVE
GLUCOSE BLD-MCNC: 167 MG/DL (ref 65–99)
GLUCOSE BLD-MCNC: 175 MG/DL (ref 65–99)
GLUCOSE BLD-MCNC: 191 MG/DL (ref 65–99)
GLUCOSE BLD-MCNC: 203 MG/DL (ref 70–99)
GLUCOSE BLD-MCNC: 207 MG/DL (ref 65–99)
GLUCOSE UR STRIP.AUTO-MCNC: NEGATIVE MG/DL
GLUCOSE UR STRIP.AUTO-MCNC: NEGATIVE MG/DL
HBA1C MFR BLD HPLC: 7.4 % (ref ?–5.7)
HCT VFR BLD AUTO: 35.2 % (ref 37–53)
HGB BLD-MCNC: 11.7 G/DL (ref 13–17)
HYALINE CASTS #/AREA URNS AUTO: PRESENT /LPF
IMMATURE GRANULOCYTE COUNT: 0.02 X10(3) UL (ref 0–1)
IMMATURE GRANULOCYTE RATIO %: 0.2 %
KETONES UR STRIP.AUTO-MCNC: NEGATIVE MG/DL
KETONES UR STRIP.AUTO-MCNC: NEGATIVE MG/DL
LEUKOCYTE ESTERASE UR QL STRIP.AUTO: NEGATIVE
LYMPHOCYTES # BLD AUTO: 1.29 X10(3) UL (ref 0.9–4)
LYMPHOCYTES NFR BLD AUTO: 15.5 %
MCH RBC QN AUTO: 26.2 PG (ref 27–33.2)
MCHC RBC AUTO-ENTMCNC: 33.2 G/DL (ref 31–37)
MCV RBC AUTO: 78.7 FL (ref 80–99)
METAPNEUMOVIRUS PCR:: NEGATIVE
MONOCYTES # BLD AUTO: 0.66 X10(3) UL (ref 0.1–1)
MONOCYTES NFR BLD AUTO: 8 %
MYCOPLASMA PNEUMONIA PCR:: NEGATIVE
NEUTROPHIL ABS PRELIM: 6.31 X10 (3) UL (ref 1.3–6.7)
NEUTROPHILS # BLD AUTO: 6.31 X10(3) UL (ref 1.3–6.7)
NEUTROPHILS NFR BLD AUTO: 76.1 %
NITRITE UR QL STRIP.AUTO: NEGATIVE
NITRITE UR QL STRIP.AUTO: NEGATIVE
OSMOLALITY SERPL CALC.SUM OF ELEC: 292 MOSM/KG (ref 275–295)
P AXIS: 8 DEGREES
P-R INTERVAL: 164 MS
PARAINFLUENZA 1 PCR:: NEGATIVE
PARAINFLUENZA 2 PCR:: NEGATIVE
PARAINFLUENZA 3 PCR:: NEGATIVE
PARAINFLUENZA 4 PCR:: NEGATIVE
PH UR STRIP.AUTO: 5 [PH] (ref 4.5–8)
PH UR STRIP.AUTO: 5 [PH] (ref 4.5–8)
PLATELET # BLD AUTO: 172 10(3)UL (ref 150–450)
POTASSIUM SERPL-SCNC: 3.8 MMOL/L (ref 3.6–5.1)
POTASSIUM SERPL-SCNC: 3.8 MMOL/L (ref 3.6–5.1)
PROCALCITONIN SERPL-MCNC: 4.38 NG/ML
PROT UR STRIP.AUTO-MCNC: 30 MG/DL
PROT UR STRIP.AUTO-MCNC: 30 MG/DL
Q-T INTERVAL: 396 MS
QRS DURATION: 118 MS
QTC CALCULATION (BEZET): 555 MS
R AXIS: -27 DEGREES
RBC # BLD AUTO: 4.47 X10(6)UL (ref 4.3–5.7)
RBC #/AREA URNS AUTO: >10 /HPF
RED CELL DISTRIBUTION WIDTH-SD: 43.8 FL (ref 35.1–46.3)
RHINOVIRUS/ENTERO PCR:: NEGATIVE
RSV RNA SPEC QL NAA+PROBE: NEGATIVE
SODIUM SERPL-SCNC: 136 MMOL/L (ref 136–144)
SP GR UR STRIP.AUTO: 1.02 (ref 1–1.03)
SP GR UR STRIP.AUTO: 1.02 (ref 1–1.03)
T AXIS: 75 DEGREES
UROBILINOGEN UR STRIP.AUTO-MCNC: 2 MG/DL
UROBILINOGEN UR STRIP.AUTO-MCNC: 2 MG/DL
VENTRICULAR RATE: 118 BPM
WBC # BLD AUTO: 8.3 X10(3) UL (ref 4–13)

## 2018-07-30 PROCEDURE — 74176 CT ABD & PELVIS W/O CONTRAST: CPT | Performed by: HOSPITALIST

## 2018-07-30 PROCEDURE — 99233 SBSQ HOSP IP/OBS HIGH 50: CPT | Performed by: HOSPITALIST

## 2018-07-30 RX ORDER — POTASSIUM CHLORIDE 20 MEQ/1
40 TABLET, EXTENDED RELEASE ORAL ONCE
Status: COMPLETED | OUTPATIENT
Start: 2018-07-30 | End: 2018-07-30

## 2018-07-30 RX ORDER — LOPERAMIDE HYDROCHLORIDE 2 MG/1
2 CAPSULE ORAL 4 TIMES DAILY PRN
Status: DISCONTINUED | OUTPATIENT
Start: 2018-07-30 | End: 2018-08-01

## 2018-07-30 RX ORDER — BENZONATATE 200 MG/1
200 CAPSULE ORAL 3 TIMES DAILY PRN
Status: DISCONTINUED | OUTPATIENT
Start: 2018-07-30 | End: 2018-08-01

## 2018-07-30 NOTE — PROGRESS NOTES
[facility]  Cardiology Progress Note    Subjective:  No chest pain or shortness of breath.     Objective:  /74 (BP Location: Left arm)   Pulse 68   Temp 97.5 °F (36.4 °C) (Oral)   Resp 18   Wt (!) 329 lb 9.4 oz (149.5 kg)   SpO2 95%   BMI 43.48 k infection    MITCHELL Metcalf  7/30/2018  12:38 PM  I agree with Dr. Diana Singh regarding the suspicion of nephrolithiasis. The patient reported to both of us that before dysuria  He experienced back pain.   Furthermore he had kidney stones in the past and

## 2018-07-30 NOTE — PROGRESS NOTES
Brief Note:    Chart reviewed    BP 97/54 (BP Location: Right arm)   Pulse 90   Temp 97.6 °F (36.4 °C) (Oral)   Resp 20   Wt (!) 329 lb 9.4 oz (149.5 kg)   SpO2 95%   BMI 43.48 kg/m²     Recent Labs   Lab  07/29/18   1547  07/30/18   0430   RBC  4.96  4.47

## 2018-07-30 NOTE — PLAN OF CARE
CARDIOVASCULAR - ADULT    • Maintains optimal cardiac output and hemodynamic stability Progressing    • Absence of cardiac arrhythmias or at baseline Progressing        Tele monitoring. I/o. Daily weight. 1500 cc fluid restriction.  Stool sent for c-diff ne

## 2018-07-30 NOTE — H&P
[facility]  History and Physical     Primus Moder Patient Status:  Inpatient    1970 MRN [id number]   [facility] 2NE-A Attending Luis Harper MD   Hosp Day # 0 PCP Klarissa Torres MD     Chief Complaint: fever    History o Take 1 tablet (20 mg total) by mouth 2 (two) times daily. Disp: 90 tablet Rfl: 4   digoxin 0.25 MG Oral Tab Take 0.5 tablets (125 mcg total) by mouth daily.  Disp:  Rfl: 0   Sacubitril-Valsartan (ENTRESTO) 49-51 MG Oral Tab Take 1 tablet by mouth 2 (two) ti lymphadenopathy. No JVD. No carotid bruits. Respiratory: Clear to auscultation bilaterally. No wheezes. No rhonchi. Cardiovascular: S1, S2. Regular rate and rhythm. No murmurs, rubs or gallops. Equal pulses. Chest and Back: No tenderness or deformity.

## 2018-07-30 NOTE — CONSULTS
[facility]  Cardiology Consultation    Jeannette Penn Patient Status:  Inpatient    1970 MRN [id number]   [facility] 2NE-A Attending Tima Wong MD   Hosp Day # 0 PCP Avery Huerta MD     Reason for Consultation:  Ingrid Shelley mg, Subcutaneous, Nightly  •  acetaminophen (TYLENOL) tab 650 mg, 650 mg, Oral, Q6H PRN  •  ondansetron HCl (ZOFRAN) injection 4 mg, 4 mg, Intravenous, Q6H PRN  •  Metoclopramide HCl (REGLAN) injection 10 mg, 10 mg, Intravenous, Q8H PRN  •  Insulin Aspart Abdomen: Soft, non-tender. BS normal. No masses felt. Extremities: Without clubbing, cyanosis or edema. No cords. Homans neg bilat  Neurologic: Alert and oriented, normal affect. Skin: Warm and dry.  No rash    Laboratory Data:    Lab Results  Componen

## 2018-07-30 NOTE — RESPIRATORY THERAPY NOTE
EVA Equipment Usage Summary :            Set Mode :AUTO CPAP W FLEX          Usage in Hours:7;31          90% Pressure (EPAP) : 7.4           90% Insp Pressure (IPAP);           AHI : 5.9          Supplemental Oxygen :  2    LPM

## 2018-07-30 NOTE — CARDIAC REHAB
Order received for heart failure education. Spoke with patient who declines any heart failure education at this time.

## 2018-07-30 NOTE — PROGRESS NOTES
[facility]  Progress Note     Will Barrera Patient Status:  Inpatient    1970 MRN [id number]   [facility] 2NE-A Attending Jennifer Lauren MD   Hosp Day # 1 PCP Karoline Benitez MD     Chief Complaint: Fever    S: Patient withou enoxaparin  75 mg Subcutaneous Nightly   • aspirin  81 mg Oral Daily   • carvedilol  25 mg Oral BID with meals   • digoxin  125 mcg Oral Daily   • HydrALAZINE HCl  100 mg Oral TID   • isosorbide dinitrate  20 mg Oral TID   • Rosuvastatin Calcium  10 mg Ora

## 2018-07-31 LAB
ALBUMIN SERPL-MCNC: 2.7 G/DL (ref 3.5–4.8)
ALBUMIN/GLOB SERPL: 0.6 {RATIO} (ref 1–2)
ALP LIVER SERPL-CCNC: 42 U/L (ref 45–117)
ALT SERPL-CCNC: 20 U/L (ref 17–63)
ANION GAP SERPL CALC-SCNC: 8 MMOL/L (ref 0–18)
AST SERPL-CCNC: 17 U/L (ref 15–41)
BASOPHILS # BLD AUTO: 0.01 X10(3) UL (ref 0–0.1)
BASOPHILS NFR BLD AUTO: 0.2 %
BILIRUB SERPL-MCNC: 0.5 MG/DL (ref 0.1–2)
BUN BLD-MCNC: 23 MG/DL (ref 8–20)
BUN/CREAT SERPL: 25.3 (ref 10–20)
CALCIUM BLD-MCNC: 8.4 MG/DL (ref 8.3–10.3)
CHLORIDE SERPL-SCNC: 103 MMOL/L (ref 101–111)
CO2 SERPL-SCNC: 29 MMOL/L (ref 22–32)
CREAT BLD-MCNC: 0.91 MG/DL (ref 0.7–1.3)
EOSINOPHIL # BLD AUTO: 0.08 X10(3) UL (ref 0–0.3)
EOSINOPHIL NFR BLD AUTO: 1.5 %
ERYTHROCYTE [DISTWIDTH] IN BLOOD BY AUTOMATED COUNT: 15.5 % (ref 11.5–16)
GLOBULIN PLAS-MCNC: 4.3 G/DL (ref 2.5–3.7)
GLUCOSE BLD-MCNC: 142 MG/DL (ref 70–99)
GLUCOSE BLD-MCNC: 164 MG/DL (ref 65–99)
GLUCOSE BLD-MCNC: 187 MG/DL (ref 65–99)
GLUCOSE BLD-MCNC: 235 MG/DL (ref 65–99)
GLUCOSE BLD-MCNC: 351 MG/DL (ref 65–99)
HCT VFR BLD AUTO: 33.3 % (ref 37–53)
HGB BLD-MCNC: 11.7 G/DL (ref 13–17)
IMMATURE GRANULOCYTE COUNT: 0.01 X10(3) UL (ref 0–1)
IMMATURE GRANULOCYTE RATIO %: 0.2 %
LYMPHOCYTES # BLD AUTO: 1.91 X10(3) UL (ref 0.9–4)
LYMPHOCYTES NFR BLD AUTO: 35.8 %
M PROTEIN MFR SERPL ELPH: 7 G/DL (ref 6.1–8.3)
MCH RBC QN AUTO: 27.2 PG (ref 27–33.2)
MCHC RBC AUTO-ENTMCNC: 35.1 G/DL (ref 31–37)
MCV RBC AUTO: 77.4 FL (ref 80–99)
MONOCYTES # BLD AUTO: 0.51 X10(3) UL (ref 0.1–1)
MONOCYTES NFR BLD AUTO: 9.6 %
NEUTROPHIL ABS PRELIM: 2.81 X10 (3) UL (ref 1.3–6.7)
NEUTROPHILS # BLD AUTO: 2.81 X10(3) UL (ref 1.3–6.7)
NEUTROPHILS NFR BLD AUTO: 52.7 %
OSMOLALITY SERPL CALC.SUM OF ELEC: 296 MOSM/KG (ref 275–295)
PLATELET # BLD AUTO: 165 10(3)UL (ref 150–450)
POTASSIUM SERPL-SCNC: 3.5 MMOL/L (ref 3.6–5.1)
POTASSIUM SERPL-SCNC: 3.5 MMOL/L (ref 3.6–5.1)
POTASSIUM SERPL-SCNC: 3.8 MMOL/L (ref 3.6–5.1)
RBC # BLD AUTO: 4.3 X10(6)UL (ref 4.3–5.7)
RED CELL DISTRIBUTION WIDTH-SD: 43 FL (ref 35.1–46.3)
SODIUM SERPL-SCNC: 140 MMOL/L (ref 136–144)
WBC # BLD AUTO: 5.3 X10(3) UL (ref 4–13)

## 2018-07-31 PROCEDURE — 99233 SBSQ HOSP IP/OBS HIGH 50: CPT | Performed by: HOSPITALIST

## 2018-07-31 RX ORDER — GARLIC EXTRACT 500 MG
1 CAPSULE ORAL DAILY
Status: DISCONTINUED | OUTPATIENT
Start: 2018-07-31 | End: 2018-08-01

## 2018-07-31 RX ORDER — SPIRONOLACTONE 25 MG/1
25 TABLET ORAL 2 TIMES DAILY
Status: DISCONTINUED | OUTPATIENT
Start: 2018-07-31 | End: 2018-08-01

## 2018-07-31 RX ORDER — TORSEMIDE 20 MG/1
20 TABLET ORAL 2 TIMES DAILY
Status: DISCONTINUED | OUTPATIENT
Start: 2018-07-31 | End: 2018-08-01

## 2018-07-31 RX ORDER — POTASSIUM CHLORIDE 20 MEQ/1
40 TABLET, EXTENDED RELEASE ORAL EVERY 4 HOURS
Status: COMPLETED | OUTPATIENT
Start: 2018-07-31 | End: 2018-07-31

## 2018-07-31 RX ORDER — PHENAZOPYRIDINE HYDROCHLORIDE 100 MG/1
100 TABLET, FILM COATED ORAL
Status: DISCONTINUED | OUTPATIENT
Start: 2018-07-31 | End: 2018-08-01

## 2018-07-31 NOTE — CM/SW NOTE
07/31/18 1403   CM/SW Referral Data   Referral Source Physician   Reason for Referral Other  (disability questions)   Informant Patient   Pertinent Medical Hx   Primary Care Physician Name dr Severo Santos Hx hyperten

## 2018-07-31 NOTE — PROGRESS NOTES
[facility]  Progress Note     Kathleen Almaguer Patient Status:  Inpatient    1970 MRN [id number]   [facility] 2NE-A Attending Jerica Pollock MD   Hosp Day # 2 PCP Macy Bryant MD     Chief Complaint: Fever    S: Patient feelin CC   • Sacubitril-Valsartan  1 tablet Oral BID   • spironolactone  25 mg Oral BID   • torsemide  20 mg Oral BID   • Insulin Aspart Pen  1-10 Units Subcutaneous TID CC and HS   • cefTRIAXone  1 g Intravenous Q24H   • enoxaparin  75 mg Subcutaneous Nightly

## 2018-07-31 NOTE — RESPIRATORY THERAPY NOTE
EVA : EQUIPMENT USE: DAILY SUMMARY                                            SET MODE: AUTO CPAP WITH CFLEX                                          USAGE IN HOURS: 10:17                                          9

## 2018-07-31 NOTE — PROGRESS NOTES
Per patient voiding small amounts today; deny dysuria  demadex  held  Yesterday;restart today  Pyridium as ordered  Continue accurate I/o

## 2018-07-31 NOTE — PROGRESS NOTES
[facility] Pharmacy Note: Antimicrobial Weight Dose Adjustment for: ceftriaxone (ROCEPHIN)    Gonzales Bliss is a 53year old male who has been prescribed ceftriaxone (ROCEPHIN) 1000 mg every 24 hours.   CrCl is estimated creatinine clearance is 113.4 mL/min (based o

## 2018-07-31 NOTE — CONSULTS
INFECTIOUS DISEASE CONSULTATION    Denita Sanchez Patient Status:  Inpatient    1970 MRN [id number]   [facility] 2NE-A Attending Rodolfo Field MD   [facility] Day # 2 PCP ANOOP Dangelo Chloride ER (K-DUR M20) CR tab 40 mEq, 40 mEq, Oral, Q4H  •  Insulin Aspart Pen (NOVOLOG) 100 UNIT/ML flexpen 1-10 Units, 1-10 Units, Subcutaneous, TID CC and HS  •  CefTRIAXone Sodium (ROCEPHIN) 1 g in sodium chloride 0.9 % 100 mL MBP/ADD-vantage, 1 g, In (37.3 °C)] 99.1 °F (37.3 °C)  Pulse:  [80-85] 80  Resp:  [16-19] 19  BP: (108-125)/(63-88) 108/63  HEENT: Moist mucous membranes. Extraocular muscles are intact. Neck: No lymphadenopathy. supple, no masses  Respiratory: Clear to auscultation bilaterally. Hypercholesterolemia     Acute on chronic congestive heart failure (HCC)     Acute on chronic congestive heart failure, unspecified heart failure type ([facility] [address]     Diabetes mellitus type 2 in obese (HCC)     Non-ischemic cardiomyopathy (HCC)     Hematoma of

## 2018-07-31 NOTE — PROGRESS NOTES
[facility]  Cardiology Progress Note    Subjective:  No chest pain or shortness of breath.     Objective:  /86 (BP Location: Left arm)   Pulse 81   Temp 98.5 °F (36.9 °C) (Oral)   Resp 19   Wt (!) 329 lb 9.4 oz (149.5 kg)   SpO2 95%   BMI 43.48 k MITCHELL  7/31/2018  11:47 AM    Arlene Boas, M.D., HUBERT., [email or web address]., F.E.S.C.   Medical Director, Heart Failure Research, [address] [place] [address] Director, [place] [address]

## 2018-08-01 VITALS
HEART RATE: 80 BPM | BODY MASS INDEX: 42 KG/M2 | SYSTOLIC BLOOD PRESSURE: 90 MMHG | RESPIRATION RATE: 18 BRPM | DIASTOLIC BLOOD PRESSURE: 74 MMHG | WEIGHT: 315 LBS | TEMPERATURE: 98 F | OXYGEN SATURATION: 96 %

## 2018-08-01 LAB
ANION GAP SERPL CALC-SCNC: 4 MMOL/L (ref 0–18)
BUN BLD-MCNC: 17 MG/DL (ref 8–20)
BUN/CREAT SERPL: 17.2 (ref 10–20)
CALCIUM BLD-MCNC: 8.5 MG/DL (ref 8.3–10.3)
CHLORIDE SERPL-SCNC: 104 MMOL/L (ref 101–111)
CO2 SERPL-SCNC: 31 MMOL/L (ref 22–32)
CREAT BLD-MCNC: 0.99 MG/DL (ref 0.7–1.3)
GLUCOSE BLD-MCNC: 154 MG/DL (ref 70–99)
GLUCOSE BLD-MCNC: 168 MG/DL (ref 65–99)
GLUCOSE BLD-MCNC: 240 MG/DL (ref 65–99)
OSMOLALITY SERPL CALC.SUM OF ELEC: 293 MOSM/KG (ref 275–295)
POTASSIUM SERPL-SCNC: 3.4 MMOL/L (ref 3.6–5.1)
SODIUM SERPL-SCNC: 139 MMOL/L (ref 136–144)

## 2018-08-01 PROCEDURE — 99239 HOSP IP/OBS DSCHRG MGMT >30: CPT | Performed by: HOSPITALIST

## 2018-08-01 RX ORDER — CIPROFLOXACIN 500 MG/1
750 TABLET, FILM COATED ORAL 2 TIMES DAILY
Qty: 20 TABLET | Refills: 0 | Status: SHIPPED | OUTPATIENT
Start: 2018-08-01 | End: 2018-08-01

## 2018-08-01 RX ORDER — CIPROFLOXACIN 500 MG/1
750 TABLET, FILM COATED ORAL 2 TIMES DAILY
Qty: 20 TABLET | Refills: 0 | Status: SHIPPED | OUTPATIENT
Start: 2018-08-01 | End: 2018-08-11

## 2018-08-01 RX ORDER — POTASSIUM CHLORIDE 20 MEQ/1
40 TABLET, EXTENDED RELEASE ORAL EVERY 4 HOURS
Status: COMPLETED | OUTPATIENT
Start: 2018-08-01 | End: 2018-08-01

## 2018-08-01 NOTE — DISCHARGE PLANNING
NURSING DISCHARGE NOTE    Discharged Home via Ambulatory. Accompanied by RN  Belongings Taken by patient/family. This RN discussed discharge paperwork with patient. IV/TELE taken off. Pt understands and verbalized new prescription and med changes.

## 2018-08-01 NOTE — PROGRESS NOTES
[facility]  Cardiology Progress Note    Subjective:  No chest pain or shortness of breath.     Objective:  /74 (BP Location: Left arm)   Pulse 81   Temp 98.4 °F (36.9 °C) (Oral)   Resp 18   Wt (!) 320 lb 15.8 oz (145.6 kg)   SpO2 96%   BMI 42.35

## 2018-08-01 NOTE — PROGRESS NOTES
[facility]                INFECTIOUS DISEASE PROGRESS NOTE    Zahra Aguilar Patient Status:  Inpatient    1970 MRN [id number]   [facility] 2NE-A Attending Allyn Shi MD   Hosp Day # 3 PCP Aidan Navarro MD     Antibioti Microbiology  Urine Culture, Routine Once [[id number]] (Abnormal)  Collected: 07/29/18 1627   Order Status: Completed Lab Status: Edited Result - FINAL Updated: 08/01/18 5107   Specimen: Urine from Urine, clean catch     Urine Culture <10,000 CFU/ML Escheri

## 2018-08-01 NOTE — DISCHARGE SUMMARY
[facility] [place] [facility]ITAL[facility]  DISCHARGE SUMMARY     Zahra Aguilar Patient Status:  Inpatient    1970 MRN [id number]   [facility] 2NE-A Attending Allyn Shi MD   Hosp Day # 3 PCP Aidan Navarro MD     Date of Admission: 2018  Date of D consulted, UC resulted with Ecoli. Patient treated for UTI, possible prostatitis. Patient with clinical improvement. Home on oral abx. Of note, patients A1c has been increasing and he is on Metformin 500 BID, and this was increasd to 1000mg BID.  Patient to known as:  LANOXIN      Take 0.0625 mcg by mouth daily. Refills:  0     HydrALAZINE HCl 100 MG Tabs  Commonly known as:  APRESOLINE      Take 100 mg by mouth 3 (three) times daily.    Refills:  0     isosorbide dinitrate 20 MG Tabs  Commonly known as:  IS edema.  -----------------------------------------------------------------------------------------------  PATIENT DISCHARGE INSTRUCTIONS: See electronic chart    Mila Gamble MD 8/1/2018    Time spent:  > 30 minutes

## 2018-08-01 NOTE — RESPIRATORY THERAPY NOTE
EVA - Equipment Use Daily Summary:  · Set Mode CFLEX  · Usage in hours: 7:00  · 90% Pressure (EPAP) level: 9.0  · 90% Insp Pressure (IPAP):   · AHI: 41.4  · Supplemental Oxygen:   · Comments:

## 2018-08-02 ENCOUNTER — PATIENT OUTREACH (OUTPATIENT)
Dept: CASE MANAGEMENT | Age: 48
End: 2018-08-02

## 2018-08-02 DIAGNOSIS — Z02.9 ENCOUNTERS FOR ADMINISTRATIVE PURPOSE: ICD-10-CM

## 2018-08-06 ENCOUNTER — OFFICE VISIT (OUTPATIENT)
Dept: FAMILY MEDICINE CLINIC | Facility: CLINIC | Age: 48
End: 2018-08-06
Payer: COMMERCIAL

## 2018-08-06 VITALS
HEART RATE: 87 BPM | BODY MASS INDEX: 44 KG/M2 | OXYGEN SATURATION: 96 % | SYSTOLIC BLOOD PRESSURE: 130 MMHG | WEIGHT: 315 LBS | DIASTOLIC BLOOD PRESSURE: 86 MMHG | RESPIRATION RATE: 18 BRPM

## 2018-08-06 DIAGNOSIS — E11.9 TYPE 2 DIABETES MELLITUS WITHOUT COMPLICATION, WITHOUT LONG-TERM CURRENT USE OF INSULIN (HCC): Chronic | ICD-10-CM

## 2018-08-06 DIAGNOSIS — G47.33 OBSTRUCTIVE SLEEP APNEA: ICD-10-CM

## 2018-08-06 DIAGNOSIS — E66.9 DIABETES MELLITUS TYPE 2 IN OBESE (HCC): ICD-10-CM

## 2018-08-06 DIAGNOSIS — E11.69 DIABETES MELLITUS TYPE 2 IN OBESE (HCC): ICD-10-CM

## 2018-08-06 DIAGNOSIS — I50.22 CHRONIC SYSTOLIC HEART FAILURE (HCC): Chronic | ICD-10-CM

## 2018-08-06 DIAGNOSIS — N30.00 ACUTE CYSTITIS WITHOUT HEMATURIA: Primary | ICD-10-CM

## 2018-08-06 PROCEDURE — 1111F DSCHRG MED/CURRENT MED MERGE: CPT | Performed by: EMERGENCY MEDICINE

## 2018-08-06 PROCEDURE — 99496 TRANSJ CARE MGMT HIGH F2F 7D: CPT | Performed by: EMERGENCY MEDICINE

## 2018-08-06 RX ORDER — LANCETS
EACH MISCELLANEOUS
Qty: 100 EACH | Refills: 2 | Status: SHIPPED | OUTPATIENT
Start: 2018-08-06 | End: 2020-01-23 | Stop reason: CLARIF

## 2018-08-06 NOTE — PROGRESS NOTES
HPI:    Renetta lAcala is a 53year old male here today for hospital follow up.    He was discharged from Inpatient hospital, [facility] to Home   Admit Date: 7/29/2018  Discharge Date: 8/1/2018  Hospital Discharge Diagnosis: Acute febrile illness, urinar Take 1 tablet (1,000 mg total) by mouth 2 (two) times daily with meals. Ciprofloxacin HCl 500 MG Oral Tab Take 1.5 tablets (750 mg total) by mouth 2 (two) times daily. torsemide 20 MG Oral Tab Take 20 mg by mouth 2 (two) times daily.      digoxin 0.25 M grandfather, maternal grandmother, and mother; Hypertension in his maternal grandfather and maternal grandmother. He  reports that he has quit smoking. He has never used smokeless tobacco. He reports that he drinks alcohol.  He reports that he does not us monofilament/sensation of both feet is normal.  Pulsation pedal pulse exam of both lower legs/feet is normal as well.   No skin lesions, skin intact      ASSESSMENT/ PLAN:   Diagnoses and all orders for this visit:    Acute cystitis without hematuria   Curr moderate    Overall Risk:   moderate    Patient seen within 7 days from date of discharge.      Avery Huerta MD, 8/6/2018

## 2018-08-06 NOTE — PATIENT INSTRUCTIONS
Thank you for choosing [facility]  To Do:  FOR RICKIE STROUD    · Follow up in 3 months  · Continue follow up with Heart Clinic  · Continue with daily blood sugar monitoring  · Follow up with pulmonology for sleep apnea              Close follow up acute myocardial infarction, or AMI)  · High blood pressure  · Damaged heart valve  · Diabetes  · Obesity  · Cigarette smoking  · Alcohol abuse  Treatment  Heart failure is a chronic condition. There is no cure.  The purpose of medical treatment is to impro daytime leg swelling. · Follow your healthcare provider's instructions closely. Besides taking your medicine as instructed, an important part of treatment includes lifestyle changes.  These include diet, physical activity, stopping smoking, and weight con Failure: Dealing with Sleep Problems     An airflow device may be needed if you have sleep apnea. If you have heart failure and you’re not sleeping well, there are many possible reasons. Many people with heart failure also have sleep apnea.  This is a con apnea unless someone notices that you have irregular breathing, gasping at night, or snoring while you sleep. You should get checked for this condition. If you have it, treatment can improve your health.  Treatment can also ease the stress on your heart and professional medical care. Always follow your healthcare professional's instructions.

## 2018-08-07 NOTE — PROGRESS NOTES
Multiple attempts to reach pt and messages left with no return call. Pt went in for HFU appt with PCP yesterday. Encounter closing.

## 2018-08-20 ENCOUNTER — PRIOR ORIGINAL RECORDS (OUTPATIENT)
Dept: OTHER | Age: 48
End: 2018-08-20

## 2018-08-30 ENCOUNTER — HOSPITAL ENCOUNTER (OUTPATIENT)
Dept: LAB | Facility: HOSPITAL | Age: 48
Discharge: HOME | End: 2018-08-30
Attending: INTERNAL MEDICINE
Payer: COMMERCIAL

## 2018-08-30 ENCOUNTER — PRIOR ORIGINAL RECORDS (OUTPATIENT)
Dept: OTHER | Age: 48
End: 2018-08-30

## 2018-08-30 LAB
ANION GAP SERPL CALC-SCNC: 8 MMOL/L (ref 0–18)
BUN BLD-MCNC: 19 MG/DL (ref 8–20)
BUN/CREAT SERPL: 16.8 (ref 10–20)
CALCIUM BLD-MCNC: 8.9 MG/DL (ref 8.3–10.3)
CHLORIDE SERPL-SCNC: 99 MMOL/L (ref 101–111)
CO2 SERPL-SCNC: 28 MMOL/L (ref 22–32)
CREAT BLD-MCNC: 1.13 MG/DL (ref 0.7–1.3)
GLUCOSE BLD-MCNC: 272 MG/DL (ref 70–99)
OSMOLALITY SERPL CALC.SUM OF ELEC: 292 MOSM/KG (ref 275–295)
POTASSIUM SERPL-SCNC: 4.2 MMOL/L (ref 3.6–5.1)
PRO-BETA NATRIURETIC PEPTIDE: 136 PG/ML (ref ?–125)
SODIUM SERPL-SCNC: 135 MMOL/L (ref 136–144)

## 2018-08-30 PROCEDURE — 83880 ASSAY OF NATRIURETIC PEPTIDE: CPT | Performed by: INTERNAL MEDICINE

## 2018-08-30 PROCEDURE — 36415 COLL VENOUS BLD VENIPUNCTURE: CPT | Performed by: INTERNAL MEDICINE

## 2018-08-30 PROCEDURE — 80048 BASIC METABOLIC PNL TOTAL CA: CPT | Performed by: INTERNAL MEDICINE

## 2018-08-31 LAB
ALKALINE PHOSPHATATE(ALK PHOS): 78 IU/L
BILIRUBIN TOTAL: 0.6 MG/DL
BUN: 19 MG/DL
CALCIUM: 8.9 MG/DL
CHLORIDE: 101 MEQ/L
CREATININE, SERUM: 1.13 MG/DL
GLUCOSE: 84 MG/DL
POTASSIUM, SERUM: 4.1 MEQ/L
PROBNP: 136 PG/ML
SGOT (AST): 26 IU/L
SGPT (ALT): 31 IU/L
SODIUM: 138 MEQ/L

## 2018-09-06 ENCOUNTER — PATIENT OUTREACH (OUTPATIENT)
Dept: FAMILY MEDICINE CLINIC | Facility: CLINIC | Age: 48
End: 2018-09-06

## 2018-09-06 NOTE — PROGRESS NOTES
Left message for patient to call office to schedule Diabetic follow up, have fasting labs done and Diabetic eye exam.

## 2018-09-21 ENCOUNTER — TELEPHONE (OUTPATIENT)
Dept: FAMILY MEDICINE CLINIC | Facility: CLINIC | Age: 48
End: 2018-09-21

## 2018-09-27 ENCOUNTER — MYAURORA ACCOUNT LINK (OUTPATIENT)
Dept: OTHER | Age: 48
End: 2018-09-27

## 2018-09-27 ENCOUNTER — PRIOR ORIGINAL RECORDS (OUTPATIENT)
Dept: OTHER | Age: 48
End: 2018-09-27

## 2018-10-01 ENCOUNTER — MED REC SCAN ONLY (OUTPATIENT)
Dept: FAMILY MEDICINE CLINIC | Facility: CLINIC | Age: 48
End: 2018-10-01

## 2018-10-04 ENCOUNTER — MYAURORA ACCOUNT LINK (OUTPATIENT)
Dept: OTHER | Age: 48
End: 2018-10-04

## 2018-10-11 ENCOUNTER — TELEPHONE (OUTPATIENT)
Dept: FAMILY MEDICINE CLINIC | Facility: CLINIC | Age: 48
End: 2018-10-11

## 2018-10-11 NOTE — TELEPHONE ENCOUNTER
Mary Trejo MD  [facility] Clinical Staff             Please document influenza vaccine in pt chart. Thank you!     Previous Messages      ----- Message -----   From: Message, Direct (Jeraldripts HISP)   Sent: 10/3/2018   4:57 PM   To: Naty Channel IMPORTANT WARNING: This message is intended for the use of the person or entity to whom it is addressed and may contain information that is privileged and confidential, the disclosure of which is governed by applicable law.  If the reader of this message is

## 2018-10-16 ENCOUNTER — TELEPHONE (OUTPATIENT)
Dept: FAMILY MEDICINE CLINIC | Facility: CLINIC | Age: 48
End: 2018-10-16

## 2018-10-16 DIAGNOSIS — E11.9 TYPE 2 DIABETES MELLITUS WITHOUT COMPLICATION, WITHOUT LONG-TERM CURRENT USE OF INSULIN (HCC): Primary | Chronic | ICD-10-CM

## 2018-10-16 NOTE — TELEPHONE ENCOUNTER
Left message for patient regarding his lab work that is still pending that he will need to be fasting for and his eye exam.

## 2018-10-26 ENCOUNTER — TELEPHONE (OUTPATIENT)
Dept: FAMILY MEDICINE CLINIC | Facility: CLINIC | Age: 48
End: 2018-10-26

## 2018-10-30 ENCOUNTER — TELEPHONE (OUTPATIENT)
Dept: FAMILY MEDICINE CLINIC | Facility: CLINIC | Age: 48
End: 2018-10-30

## 2018-10-30 ENCOUNTER — MYAURORA ACCOUNT LINK (OUTPATIENT)
Dept: OTHER | Age: 48
End: 2018-10-30

## 2018-10-30 ENCOUNTER — HOSPITAL ENCOUNTER (OUTPATIENT)
Dept: CV DIAGNOSTICS | Facility: HOSPITAL | Age: 48
Discharge: HOME | End: 2018-10-30
Attending: INTERNAL MEDICINE

## 2018-10-30 DIAGNOSIS — R06.00 DYSPNEA, UNSPECIFIED TYPE: ICD-10-CM

## 2018-10-30 LAB — LV EF: 17.5 %

## 2018-10-31 ENCOUNTER — PRIOR ORIGINAL RECORDS (OUTPATIENT)
Dept: OTHER | Age: 48
End: 2018-10-31

## 2018-11-21 ENCOUNTER — OFFICE VISIT (OUTPATIENT)
Dept: FAMILY MEDICINE CLINIC | Facility: CLINIC | Age: 48
End: 2018-11-21
Payer: COMMERCIAL

## 2018-11-21 ENCOUNTER — TELEPHONE (OUTPATIENT)
Dept: FAMILY MEDICINE CLINIC | Facility: CLINIC | Age: 48
End: 2018-11-21

## 2018-11-21 VITALS
DIASTOLIC BLOOD PRESSURE: 100 MMHG | OXYGEN SATURATION: 95 % | BODY MASS INDEX: 47 KG/M2 | RESPIRATION RATE: 18 BRPM | SYSTOLIC BLOOD PRESSURE: 142 MMHG | WEIGHT: 315 LBS | HEART RATE: 85 BPM

## 2018-11-21 DIAGNOSIS — I10 HYPERTENSION, UNCONTROLLED: ICD-10-CM

## 2018-11-21 DIAGNOSIS — R30.0 BURNING WITH URINATION: ICD-10-CM

## 2018-11-21 DIAGNOSIS — N48.1 BALANITIS: ICD-10-CM

## 2018-11-21 DIAGNOSIS — E11.65 UNCONTROLLED TYPE 2 DIABETES MELLITUS WITH HYPERGLYCEMIA (HCC): Primary | ICD-10-CM

## 2018-11-21 DIAGNOSIS — K62.89 RECTAL PAIN: ICD-10-CM

## 2018-11-21 PROCEDURE — 99214 OFFICE O/P EST MOD 30 MIN: CPT | Performed by: EMERGENCY MEDICINE

## 2018-11-21 PROCEDURE — 81003 URINALYSIS AUTO W/O SCOPE: CPT | Performed by: EMERGENCY MEDICINE

## 2018-11-21 PROCEDURE — 83036 HEMOGLOBIN GLYCOSYLATED A1C: CPT | Performed by: EMERGENCY MEDICINE

## 2018-11-21 PROCEDURE — 82962 GLUCOSE BLOOD TEST: CPT | Performed by: EMERGENCY MEDICINE

## 2018-11-21 PROCEDURE — 87086 URINE CULTURE/COLONY COUNT: CPT | Performed by: EMERGENCY MEDICINE

## 2018-11-21 RX ORDER — CIPROFLOXACIN 500 MG/1
500 TABLET, FILM COATED ORAL 2 TIMES DAILY
Qty: 14 TABLET | Refills: 0 | Status: SHIPPED | OUTPATIENT
Start: 2018-11-21 | End: 2018-11-28

## 2018-11-21 RX ORDER — CLOTRIMAZOLE 1 %
1 CREAM (GRAM) TOPICAL 2 TIMES DAILY
Qty: 45 G | Refills: 0 | Status: SHIPPED | OUTPATIENT
Start: 2018-11-21 | End: 2019-10-28 | Stop reason: ALTCHOICE

## 2018-11-21 RX ORDER — INSULIN LISPRO 100 [IU]/ML
INJECTION, SOLUTION INTRAVENOUS; SUBCUTANEOUS
Qty: 10 ML | Refills: 2 | Status: ON HOLD | OUTPATIENT
Start: 2018-11-21 | End: 2020-09-25

## 2018-11-21 RX ORDER — LORATADINE 10 MG/1
10 TABLET ORAL DAILY
Qty: 30 TABLET | Refills: 2 | Status: SHIPPED | OUTPATIENT
Start: 2018-11-21 | End: 2019-10-28 | Stop reason: ALTCHOICE

## 2018-11-21 NOTE — TELEPHONE ENCOUNTER
The pharmacy is needing directions or at least a max daily dose of Insulin Lispro (HUMALOG) 100 UNIT/ML Subcutaneous Solution for insurance purposes.

## 2018-11-21 NOTE — TELEPHONE ENCOUNTER
Sliding scale  150-199      2 units  200-249      4 units  250-299      6 units  300-349      8 units  350-399      10 units  > 400           12 units (call MD)       Called and spoke with pharmacy.  Pharmacist informed per sliding scale that pt's max daily

## 2018-11-21 NOTE — PROGRESS NOTES
Chief Complaint:   Patient presents with:  Painful Urination: C/o burning and pink urine  Blood Sugar: High blood sugar readings   Hemorrhoids: C/o constipation and hemorrhoids    HPI:   This is a 53year old male     C/O elevated blood sugars since 1 week (two) times daily. Disp: 90 tablet Rfl: 4   digoxin 0.25 MG Oral Tab Take 0.0625 mcg by mouth daily. Disp:  Rfl: 0   Sacubitril-Valsartan (ENTRESTO) 49-51 MG Oral Tab Take 1 tablet by mouth 2 (two) times daily.  Disp: 60 tablet Rfl: 11   spironolactone of groin     Urinary tract infection without hematuria     Urinary tract infection without hematuria, site unspecified     Acute febrile illness     CORNELIO (acute kidney injury) ([facility]     Acute prostatitis        REVIEW OF SYSTEMS:   Review of systems signifi Time: 11/21/18  1:24 PM   Result Value Ref Range    GLUCOSE (URINE DIPSTICK) 250 mg/dL    BILIRUBIN Negative Negative    KETONES (URINE DIPSTICK) Negative Negative mg/dL    SPECIFIC GRAVITY 1.025 1.005 - 1.030    OCCULT BLOOD Negative Negative    PH, URINE three times a day, per insulin sliding scale, as needed  -     Insulin Syringe-Needle U-100 (BD INSULIN SYRINGE ULTRAFINE) 31G X 5/16\" 0.3 ML Does not apply Misc; Inject insulin subcutaneously 3 times a day as needed, as per insulin sliding scale    Og

## 2018-11-21 NOTE — PATIENT INSTRUCTIONS
Thank you for choosing [facility]  To Do:  FOR RICKIE STROUD    · Start ciprofloxacin twice a day for bladder infection  · Monitor blood sugars 3 times a day before meals, record and bring to next visit  · Contiue with MEtformin   · Start Ozempic 0 present. · If you have diabetes, talk with your doctor about keeping your diabetes in good control. · If you are overweight, talk with your doctor about a weight loss plan. Follow-up care  Follow up with your healthcare provider, or as advised.   When to

## 2018-11-27 PROBLEM — I10 HYPERTENSION, UNCONTROLLED: Status: ACTIVE | Noted: 2018-11-27

## 2018-11-27 RX ORDER — BLOOD SUGAR DIAGNOSTIC
STRIP MISCELLANEOUS
Qty: 1 BOX | Refills: 1 | Status: SHIPPED | OUTPATIENT
Start: 2018-11-27 | End: 2020-01-23 | Stop reason: CLARIF

## 2018-12-03 ENCOUNTER — TELEPHONE (OUTPATIENT)
Dept: FAMILY MEDICINE CLINIC | Facility: CLINIC | Age: 48
End: 2018-12-03

## 2018-12-03 NOTE — TELEPHONE ENCOUNTER
Pharmacy called stating that pt says he uses Glucose Blood (ONETOUCH ULTRA BLUE) In Vitro Strip   3x a day. Pharmacy requesting updated rx.

## 2018-12-06 ENCOUNTER — PRIOR ORIGINAL RECORDS (OUTPATIENT)
Dept: OTHER | Age: 48
End: 2018-12-06

## 2018-12-06 ENCOUNTER — MYAURORA ACCOUNT LINK (OUTPATIENT)
Dept: OTHER | Age: 48
End: 2018-12-06

## 2018-12-13 ENCOUNTER — PRIOR ORIGINAL RECORDS (OUTPATIENT)
Dept: OTHER | Age: 48
End: 2018-12-13

## 2018-12-28 ENCOUNTER — TELEPHONE (OUTPATIENT)
Dept: FAMILY MEDICINE CLINIC | Facility: CLINIC | Age: 48
End: 2018-12-28

## 2019-01-02 ENCOUNTER — TELEPHONE (OUTPATIENT)
Dept: FAMILY MEDICINE CLINIC | Facility: CLINIC | Age: 49
End: 2019-01-02

## 2019-01-08 ENCOUNTER — TELEPHONE (OUTPATIENT)
Dept: FAMILY MEDICINE CLINIC | Facility: CLINIC | Age: 49
End: 2019-01-08

## 2019-01-11 ENCOUNTER — MED REC SCAN ONLY (OUTPATIENT)
Dept: FAMILY MEDICINE CLINIC | Facility: CLINIC | Age: 49
End: 2019-01-11

## 2019-02-05 ENCOUNTER — TELEPHONE (OUTPATIENT)
Dept: FAMILY MEDICINE CLINIC | Facility: CLINIC | Age: 49
End: 2019-02-05

## 2019-02-28 VITALS — WEIGHT: 315 LBS | SYSTOLIC BLOOD PRESSURE: 142 MMHG | HEART RATE: 74 BPM | DIASTOLIC BLOOD PRESSURE: 102 MMHG

## 2019-02-28 VITALS
HEIGHT: 72 IN | HEART RATE: 74 BPM | WEIGHT: 315 LBS | SYSTOLIC BLOOD PRESSURE: 108 MMHG | DIASTOLIC BLOOD PRESSURE: 70 MMHG | BODY MASS INDEX: 42.66 KG/M2

## 2019-02-28 VITALS
HEIGHT: 72 IN | BODY MASS INDEX: 42.66 KG/M2 | SYSTOLIC BLOOD PRESSURE: 106 MMHG | WEIGHT: 315 LBS | HEART RATE: 86 BPM | DIASTOLIC BLOOD PRESSURE: 76 MMHG

## 2019-02-28 VITALS
HEART RATE: 75 BPM | DIASTOLIC BLOOD PRESSURE: 64 MMHG | HEIGHT: 72 IN | BODY MASS INDEX: 42.66 KG/M2 | SYSTOLIC BLOOD PRESSURE: 110 MMHG | WEIGHT: 315 LBS

## 2019-03-03 RX ORDER — BENZONATATE 100 MG/1
CAPSULE ORAL
COMMUNITY
Start: 2018-06-28

## 2019-03-03 RX ORDER — HYDRALAZINE HYDROCHLORIDE 100 MG/1
1 TABLET, FILM COATED ORAL 3 TIMES DAILY
COMMUNITY
Start: 2018-09-27 | End: 2019-10-18 | Stop reason: SDUPTHER

## 2019-03-03 RX ORDER — INSULIN ASPART 100 [IU]/ML
INJECTION, SOLUTION INTRAVENOUS; SUBCUTANEOUS
COMMUNITY
End: 2020-12-13 | Stop reason: DRUGHIGH

## 2019-03-03 RX ORDER — ROSUVASTATIN CALCIUM 10 MG/1
1 TABLET, COATED ORAL AT BEDTIME
COMMUNITY
Start: 2018-06-28

## 2019-03-03 RX ORDER — TORSEMIDE 20 MG/1
1 TABLET ORAL DAILY
COMMUNITY
Start: 2018-09-27 | End: 2020-12-11 | Stop reason: DRUGHIGH

## 2019-03-03 RX ORDER — ALBUTEROL SULFATE 2.5 MG/3ML
SOLUTION RESPIRATORY (INHALATION)
COMMUNITY
Start: 2018-06-28

## 2019-03-03 RX ORDER — LORATADINE 10 MG/1
TABLET ORAL
COMMUNITY
End: 2020-12-28 | Stop reason: HOSPADM

## 2019-03-03 RX ORDER — ISOSORBIDE DINITRATE 20 MG/1
1 TABLET ORAL 3 TIMES DAILY
COMMUNITY
Start: 2018-06-28 | End: 2019-10-18 | Stop reason: SDUPTHER

## 2019-03-03 RX ORDER — DIGOXIN 250 MCG
1 TABLET ORAL DAILY
COMMUNITY
Start: 2018-09-27 | End: 2019-10-18 | Stop reason: SDUPTHER

## 2019-03-03 RX ORDER — CARVEDILOL 25 MG/1
1 TABLET ORAL 2 TIMES DAILY
COMMUNITY
Start: 2018-09-27 | End: 2019-10-18 | Stop reason: SDUPTHER

## 2019-03-03 RX ORDER — SPIRONOLACTONE 25 MG/1
2 TABLET ORAL DAILY
COMMUNITY
Start: 2018-06-28 | End: 2019-10-19 | Stop reason: SDUPTHER

## 2019-03-08 PROBLEM — Z95.810 ICD (IMPLANTABLE CARDIOVERTER-DEFIBRILLATOR) IN PLACE: Status: ACTIVE | Noted: 2019-03-08

## 2019-03-08 PROBLEM — I10 HYPERTENSION: Status: ACTIVE | Noted: 2019-03-08

## 2019-03-08 PROBLEM — I50.22 CHRONIC SYSTOLIC CONGESTIVE HEART FAILURE (CMD): Status: ACTIVE | Noted: 2019-03-08

## 2019-03-08 PROBLEM — E78.00 HYPERCHOLESTEREMIA: Status: ACTIVE | Noted: 2019-03-08

## 2019-03-08 PROBLEM — G47.30 SLEEP APNEA: Status: ACTIVE | Noted: 2019-03-08

## 2019-03-08 PROBLEM — R06.00 DYSPNEA: Status: ACTIVE | Noted: 2019-03-08

## 2019-03-08 PROBLEM — I27.20 PULMONARY HTN (CMD): Status: ACTIVE | Noted: 2019-03-08

## 2019-03-14 ENCOUNTER — ANCILLARY PROCEDURE (OUTPATIENT)
Dept: CARDIOLOGY | Age: 49
End: 2019-03-14
Attending: INTERNAL MEDICINE

## 2019-03-14 ENCOUNTER — ANCILLARY ORDERS (OUTPATIENT)
Dept: CARDIOLOGY | Age: 49
End: 2019-03-14

## 2019-03-14 DIAGNOSIS — Z95.810 ICD (IMPLANTABLE CARDIOVERTER-DEFIBRILLATOR) IN PLACE: ICD-10-CM

## 2019-04-15 ENCOUNTER — APPOINTMENT (OUTPATIENT)
Dept: CARDIOLOGY | Age: 49
End: 2019-04-15
Attending: INTERNAL MEDICINE

## 2019-05-13 ENCOUNTER — HOSPITAL ENCOUNTER (EMERGENCY)
Age: 49
Discharge: HOME | End: 2019-05-13
Attending: EMERGENCY MEDICINE
Payer: COMMERCIAL

## 2019-05-13 ENCOUNTER — OFFICE VISIT (OUTPATIENT)
Dept: FAMILY MEDICINE CLINIC | Facility: CLINIC | Age: 49
End: 2019-05-13
Payer: COMMERCIAL

## 2019-05-13 ENCOUNTER — APPOINTMENT (OUTPATIENT)
Dept: GENERAL RADIOLOGY | Age: 49
End: 2019-05-13
Attending: EMERGENCY MEDICINE
Payer: COMMERCIAL

## 2019-05-13 VITALS
RESPIRATION RATE: 18 BRPM | HEIGHT: 73 IN | HEART RATE: 92 BPM | DIASTOLIC BLOOD PRESSURE: 78 MMHG | WEIGHT: 315 LBS | TEMPERATURE: 98 F | OXYGEN SATURATION: 95 % | BODY MASS INDEX: 41.75 KG/M2 | SYSTOLIC BLOOD PRESSURE: 110 MMHG

## 2019-05-13 VITALS
HEART RATE: 83 BPM | DIASTOLIC BLOOD PRESSURE: 78 MMHG | TEMPERATURE: 98 F | BODY MASS INDEX: 48 KG/M2 | OXYGEN SATURATION: 96 % | SYSTOLIC BLOOD PRESSURE: 106 MMHG | WEIGHT: 315 LBS | RESPIRATION RATE: 18 BRPM

## 2019-05-13 DIAGNOSIS — Z02.9 ENCOUNTER FOR ADMINISTRATIVE EXAMINATIONS: Primary | ICD-10-CM

## 2019-05-13 DIAGNOSIS — R06.02 SHORTNESS OF BREATH: Primary | ICD-10-CM

## 2019-05-13 PROCEDURE — 81003 URINALYSIS AUTO W/O SCOPE: CPT | Performed by: EMERGENCY MEDICINE

## 2019-05-13 PROCEDURE — 84450 TRANSFERASE (AST) (SGOT): CPT | Performed by: EMERGENCY MEDICINE

## 2019-05-13 PROCEDURE — 84484 ASSAY OF TROPONIN QUANT: CPT | Performed by: EMERGENCY MEDICINE

## 2019-05-13 PROCEDURE — 99285 EMERGENCY DEPT VISIT HI MDM: CPT | Performed by: EMERGENCY MEDICINE

## 2019-05-13 PROCEDURE — 96374 THER/PROPH/DIAG INJ IV PUSH: CPT | Performed by: EMERGENCY MEDICINE

## 2019-05-13 PROCEDURE — 93005 ELECTROCARDIOGRAM TRACING: CPT

## 2019-05-13 PROCEDURE — 84132 ASSAY OF SERUM POTASSIUM: CPT | Performed by: EMERGENCY MEDICINE

## 2019-05-13 PROCEDURE — 93010 ELECTROCARDIOGRAM REPORT: CPT | Performed by: EMERGENCY MEDICINE

## 2019-05-13 PROCEDURE — 80053 COMPREHEN METABOLIC PANEL: CPT | Performed by: EMERGENCY MEDICINE

## 2019-05-13 PROCEDURE — 85378 FIBRIN DEGRADE SEMIQUANT: CPT | Performed by: EMERGENCY MEDICINE

## 2019-05-13 PROCEDURE — 85025 COMPLETE CBC W/AUTO DIFF WBC: CPT | Performed by: EMERGENCY MEDICINE

## 2019-05-13 PROCEDURE — 83880 ASSAY OF NATRIURETIC PEPTIDE: CPT | Performed by: EMERGENCY MEDICINE

## 2019-05-13 PROCEDURE — 80162 ASSAY OF DIGOXIN TOTAL: CPT | Performed by: EMERGENCY MEDICINE

## 2019-05-13 PROCEDURE — 71046 X-RAY EXAM CHEST 2 VIEWS: CPT | Performed by: EMERGENCY MEDICINE

## 2019-05-13 RX ORDER — FUROSEMIDE 10 MG/ML
40 INJECTION INTRAMUSCULAR; INTRAVENOUS ONCE
Status: COMPLETED | OUTPATIENT
Start: 2019-05-13 | End: 2019-05-13

## 2019-05-13 RX ORDER — POTASSIUM CHLORIDE 750 MG/1
10 TABLET, EXTENDED RELEASE ORAL ONCE
Status: COMPLETED | OUTPATIENT
Start: 2019-05-13 | End: 2019-05-13

## 2019-05-13 NOTE — ED INITIAL ASSESSMENT (HPI)
Pt states he feels week, coughing and has had FLORIDALMA when he lays down since receiving a vaccine

## 2019-05-13 NOTE — PROGRESS NOTES
Patient presents to walk in clinic with almost 2 day history of anxious feeling, mild SOB and denies chest pain but notes tightness with breathing. He started a new job at [employer] [address] and received his second varicella vaccine two days ago.  He reports i Disp: 90 tablet Rfl: 4   digoxin 0.25 MG Oral Tab Take 0.0625 mcg by mouth daily. Disp:  Rfl: 0   Sacubitril-Valsartan (ENTRESTO) 49-51 MG Oral Tab Take 1 tablet by mouth 2 (two) times daily.  Disp: 60 tablet Rfl: 11   spironolactone 25 MG Oral Tab Take 2 PERLLA, conjunctiva clear, atraumatic, normocephalic,  NECK: supple,no adenopathy  LUNGS: diminished air entry noted, large body habitus limits exam  CARDIO: RRR without murmur, range from 90 to 168 with pulse ox on finger, SpO2 from 92 to 98% variable  EX

## 2019-05-13 NOTE — ED PROVIDER NOTES
Patient Seen in: [facility] [place]    History   Patient presents with:  Dyspnea FLORIDALMA SOB (respiratory)    Stated Complaint: short of breath, o2 sat 94-98, history of CHF    HPI    Patient went to a walk-in clinic earlier today.   He Use      Smoking status: Former Smoker      Smokeless tobacco: Never Used    Alcohol use: Yes      Comment: social    Drug use: No      Review of Systems    Positive for stated complaint: short of breath, o2 sat 94-98, history of CHF  Other systems are as following components:    Troponin 0.147 (*)     All other components within normal limits   PRO BETA NATRIURETIC PEPTIDE - Abnormal; Notable for the following components:    Pro-Beta Natriuretic Peptide 967 (*)     All other components within normal limits ------                     STOOL CULTURE(P)[[id number]]                                                            SHIGATOXIN[[id number]]                                                                    Please view results for these tests on the individual encounter diagnosis)    Disposition:  Discharge  5/13/2019  8:55 pm    Follow-up:  Noy Hough, [address]    Schedule an appointment as soon as possible for a visit          Medications Prescribed:  Josh Duff

## 2019-05-14 ENCOUNTER — APPOINTMENT (OUTPATIENT)
Dept: LAB | Age: 49
End: 2019-05-14
Attending: EMERGENCY MEDICINE
Payer: COMMERCIAL

## 2019-05-14 PROCEDURE — 87045 FECES CULTURE AEROBIC BACT: CPT | Performed by: EMERGENCY MEDICINE

## 2019-05-14 PROCEDURE — 87427 SHIGA-LIKE TOXIN AG IA: CPT | Performed by: EMERGENCY MEDICINE

## 2019-05-14 PROCEDURE — 87046 STOOL CULTR AEROBIC BACT EA: CPT | Performed by: EMERGENCY MEDICINE

## 2019-05-14 PROCEDURE — 82272 OCCULT BLD FECES 1-3 TESTS: CPT | Performed by: EMERGENCY MEDICINE

## 2019-05-14 PROCEDURE — 87493 C DIFF AMPLIFIED PROBE: CPT | Performed by: EMERGENCY MEDICINE

## 2019-05-30 ENCOUNTER — APPOINTMENT (OUTPATIENT)
Dept: CARDIOLOGY | Age: 49
End: 2019-05-30
Attending: INTERNAL MEDICINE

## 2019-06-07 ENCOUNTER — CLINICAL ABSTRACT (OUTPATIENT)
Dept: CARDIOLOGY | Age: 49
End: 2019-06-07

## 2019-06-13 PROCEDURE — 93295 DEV INTERROG REMOTE 1/2/MLT: CPT | Performed by: INTERNAL MEDICINE

## 2019-06-13 PROCEDURE — 93296 REM INTERROG EVL PM/IDS: CPT | Performed by: INTERNAL MEDICINE

## 2019-06-18 ENCOUNTER — ANCILLARY PROCEDURE (OUTPATIENT)
Dept: CARDIOLOGY | Age: 49
End: 2019-06-18
Attending: INTERNAL MEDICINE

## 2019-06-18 ENCOUNTER — TELEPHONE (OUTPATIENT)
Dept: CARDIOLOGY | Age: 49
End: 2019-06-18

## 2019-06-18 ENCOUNTER — ANCILLARY ORDERS (OUTPATIENT)
Dept: CARDIOLOGY | Age: 49
End: 2019-06-18

## 2019-06-18 DIAGNOSIS — Z95.810 ICD (IMPLANTABLE CARDIOVERTER-DEFIBRILLATOR) IN PLACE: ICD-10-CM

## 2019-10-18 ENCOUNTER — TELEPHONE (OUTPATIENT)
Dept: CARDIOLOGY | Age: 49
End: 2019-10-18

## 2019-10-18 DIAGNOSIS — E78.00 HYPERCHOLESTEROLEMIA: ICD-10-CM

## 2019-10-18 DIAGNOSIS — E11.9 TYPE 2 DIABETES MELLITUS WITHOUT COMPLICATION, WITHOUT LONG-TERM CURRENT USE OF INSULIN (HCC): Chronic | ICD-10-CM

## 2019-10-18 DIAGNOSIS — Z00.00 LABORATORY EXAMINATION ORDERED AS PART OF A ROUTINE GENERAL MEDICAL EXAMINATION: Primary | ICD-10-CM

## 2019-10-18 DIAGNOSIS — I50.22 CHRONIC SYSTOLIC HEART FAILURE (HCC): Chronic | ICD-10-CM

## 2019-10-18 RX ORDER — SACUBITRIL AND VALSARTAN 97; 103 MG/1; MG/1
TABLET, FILM COATED ORAL
Qty: 60 TABLET | Refills: 0 | Status: SHIPPED | OUTPATIENT
Start: 2019-10-18 | End: 2019-11-18 | Stop reason: SDUPTHER

## 2019-10-18 RX ORDER — ISOSORBIDE DINITRATE 20 MG/1
TABLET ORAL
Qty: 90 TABLET | Refills: 0 | Status: SHIPPED | OUTPATIENT
Start: 2019-10-18 | End: 2020-03-26

## 2019-10-18 RX ORDER — HYDRALAZINE HYDROCHLORIDE 100 MG/1
TABLET, FILM COATED ORAL
Qty: 90 TABLET | Refills: 0 | Status: SHIPPED | OUTPATIENT
Start: 2019-10-18 | End: 2019-11-18 | Stop reason: SDUPTHER

## 2019-10-18 RX ORDER — SPIRONOLACTONE 25 MG/1
TABLET ORAL
Qty: 60 TABLET | Refills: 0 | OUTPATIENT
Start: 2019-10-18

## 2019-10-18 RX ORDER — DIGOXIN 250 MCG
250 TABLET ORAL DAILY
Qty: 30 TABLET | Refills: 0 | Status: SHIPPED | OUTPATIENT
Start: 2019-10-18 | End: 2019-11-18 | Stop reason: SDUPTHER

## 2019-10-18 RX ORDER — CARVEDILOL 25 MG/1
TABLET ORAL
Qty: 60 TABLET | Refills: 0 | Status: SHIPPED | OUTPATIENT
Start: 2019-10-18 | End: 2019-11-14 | Stop reason: SDUPTHER

## 2019-10-18 RX ORDER — ASPIRIN 81 MG/1
TABLET, COATED ORAL
Qty: 30 TABLET | Refills: 0 | Status: SHIPPED | OUTPATIENT
Start: 2019-10-18 | End: 2019-11-14 | Stop reason: SDUPTHER

## 2019-10-18 RX ORDER — TORSEMIDE 20 MG/1
TABLET ORAL
Qty: 90 TABLET | Refills: 0 | OUTPATIENT
Start: 2019-10-18

## 2019-10-18 NOTE — TELEPHONE ENCOUNTER
Medication(s) to Refill:   Requested Prescriptions     Pending Prescriptions Disp Refills   • ROSUVASTATIN CALCIUM 10 MG Oral Tab [Pharmacy Med Name: ROSUVASTATIN 10MG TABLETS] 30 tablet 0     Sig: TAKE 1 TABLET(10 MG) BY MOUTH EVERY NIGHT         Reason f

## 2019-10-19 ENCOUNTER — TELEPHONE (OUTPATIENT)
Dept: CARDIOLOGY | Age: 49
End: 2019-10-19

## 2019-10-23 RX ORDER — SPIRONOLACTONE 25 MG/1
TABLET ORAL
Qty: 60 TABLET | Refills: 0 | Status: SHIPPED | OUTPATIENT
Start: 2019-10-23 | End: 2020-03-26

## 2019-10-24 RX ORDER — ROSUVASTATIN CALCIUM 10 MG/1
TABLET, COATED ORAL
Qty: 30 TABLET | Refills: 0 | OUTPATIENT
Start: 2019-10-24

## 2019-10-24 RX ORDER — TORSEMIDE 20 MG/1
TABLET ORAL
Qty: 90 TABLET | Refills: 0 | OUTPATIENT
Start: 2019-10-24

## 2019-10-24 NOTE — TELEPHONE ENCOUNTER
Med refill denied as pt last seen 11 months ago. Needs appt. Pharmacy called with this denial and pt called for appt. Spoke to pt and appt made for Monday 10/28.   Labs in system

## 2019-10-25 PROBLEM — R06.00 DYSPNEA: Status: ACTIVE | Noted: 2019-03-08

## 2019-10-25 PROBLEM — G47.30 SLEEP APNEA: Status: ACTIVE | Noted: 2018-04-25

## 2019-10-25 PROBLEM — I50.22 CHRONIC SYSTOLIC CONGESTIVE HEART FAILURE (HCC): Status: ACTIVE | Noted: 2018-04-25

## 2019-10-25 PROBLEM — I10 HYPERTENSION: Status: ACTIVE | Noted: 2018-11-27

## 2019-10-25 PROBLEM — Z95.810 ICD (IMPLANTABLE CARDIOVERTER-DEFIBRILLATOR) IN PLACE: Status: ACTIVE | Noted: 2019-03-08

## 2019-10-25 PROBLEM — E78.00 HYPERCHOLESTEREMIA: Status: ACTIVE | Noted: 2018-05-12

## 2019-10-25 PROBLEM — I27.20 PULMONARY HTN (HCC): Status: ACTIVE | Noted: 2019-03-08

## 2019-10-26 ENCOUNTER — LAB ENCOUNTER (OUTPATIENT)
Dept: LAB | Age: 49
End: 2019-10-26
Attending: EMERGENCY MEDICINE
Payer: COMMERCIAL

## 2019-10-26 DIAGNOSIS — E78.00 HYPERCHOLESTEROLEMIA: ICD-10-CM

## 2019-10-26 DIAGNOSIS — E11.9 TYPE 2 DIABETES MELLITUS WITHOUT COMPLICATION, WITHOUT LONG-TERM CURRENT USE OF INSULIN (HCC): ICD-10-CM

## 2019-10-26 DIAGNOSIS — Z00.00 LABORATORY EXAMINATION ORDERED AS PART OF A ROUTINE GENERAL MEDICAL EXAMINATION: ICD-10-CM

## 2019-10-26 PROCEDURE — 82570 ASSAY OF URINE CREATININE: CPT | Performed by: EMERGENCY MEDICINE

## 2019-10-26 PROCEDURE — 80050 GENERAL HEALTH PANEL: CPT | Performed by: EMERGENCY MEDICINE

## 2019-10-26 PROCEDURE — 36415 COLL VENOUS BLD VENIPUNCTURE: CPT | Performed by: EMERGENCY MEDICINE

## 2019-10-26 PROCEDURE — 82043 UR ALBUMIN QUANTITATIVE: CPT | Performed by: EMERGENCY MEDICINE

## 2019-10-26 PROCEDURE — 80061 LIPID PANEL: CPT | Performed by: EMERGENCY MEDICINE

## 2019-10-26 PROCEDURE — 83036 HEMOGLOBIN GLYCOSYLATED A1C: CPT | Performed by: EMERGENCY MEDICINE

## 2019-10-28 ENCOUNTER — OFFICE VISIT (OUTPATIENT)
Dept: FAMILY MEDICINE CLINIC | Facility: CLINIC | Age: 49
End: 2019-10-28
Payer: COMMERCIAL

## 2019-10-28 VITALS
HEIGHT: 73 IN | DIASTOLIC BLOOD PRESSURE: 102 MMHG | WEIGHT: 315 LBS | BODY MASS INDEX: 41.75 KG/M2 | SYSTOLIC BLOOD PRESSURE: 148 MMHG | RESPIRATION RATE: 18 BRPM | OXYGEN SATURATION: 99 % | TEMPERATURE: 98 F | HEART RATE: 110 BPM

## 2019-10-28 DIAGNOSIS — E11.69 HYPERLIPIDEMIA ASSOCIATED WITH TYPE 2 DIABETES MELLITUS (HCC): ICD-10-CM

## 2019-10-28 DIAGNOSIS — E11.65 UNCONTROLLED TYPE 2 DIABETES MELLITUS WITH HYPERGLYCEMIA (HCC): ICD-10-CM

## 2019-10-28 DIAGNOSIS — E78.5 HYPERLIPIDEMIA ASSOCIATED WITH TYPE 2 DIABETES MELLITUS (HCC): ICD-10-CM

## 2019-10-28 DIAGNOSIS — Z23 NEED FOR VACCINATION: ICD-10-CM

## 2019-10-28 DIAGNOSIS — G47.33 OBSTRUCTIVE SLEEP APNEA SYNDROME: ICD-10-CM

## 2019-10-28 DIAGNOSIS — Z00.00 ENCOUNTER FOR ANNUAL PHYSICAL EXAM: Primary | ICD-10-CM

## 2019-10-28 DIAGNOSIS — J45.20 MILD INTERMITTENT ASTHMA, UNSPECIFIED WHETHER COMPLICATED: ICD-10-CM

## 2019-10-28 DIAGNOSIS — E66.01 MORBID OBESITY WITH BMI OF 45.0-49.9, ADULT (HCC): ICD-10-CM

## 2019-10-28 DIAGNOSIS — Z23 NEED FOR TDAP VACCINATION: ICD-10-CM

## 2019-10-28 DIAGNOSIS — I50.22 CHRONIC SYSTOLIC CONGESTIVE HEART FAILURE (HCC): ICD-10-CM

## 2019-10-28 PROCEDURE — 99212 OFFICE O/P EST SF 10 MIN: CPT | Performed by: EMERGENCY MEDICINE

## 2019-10-28 PROCEDURE — 99396 PREV VISIT EST AGE 40-64: CPT | Performed by: EMERGENCY MEDICINE

## 2019-10-28 PROCEDURE — 90472 IMMUNIZATION ADMIN EACH ADD: CPT | Performed by: EMERGENCY MEDICINE

## 2019-10-28 PROCEDURE — 90686 IIV4 VACC NO PRSV 0.5 ML IM: CPT | Performed by: EMERGENCY MEDICINE

## 2019-10-28 PROCEDURE — 90715 TDAP VACCINE 7 YRS/> IM: CPT | Performed by: EMERGENCY MEDICINE

## 2019-10-28 PROCEDURE — 90471 IMMUNIZATION ADMIN: CPT | Performed by: EMERGENCY MEDICINE

## 2019-10-28 RX ORDER — ROSUVASTATIN CALCIUM 20 MG/1
20 TABLET, COATED ORAL NIGHTLY
Qty: 90 TABLET | Refills: 0 | Status: SHIPPED | OUTPATIENT
Start: 2019-10-28 | End: 2020-06-03

## 2019-10-28 RX ORDER — TORSEMIDE 20 MG/1
TABLET ORAL
Qty: 90 TABLET | Refills: 0 | Status: SHIPPED | OUTPATIENT
Start: 2019-10-28 | End: 2020-03-02

## 2019-10-28 RX ORDER — ALBUTEROL SULFATE 2.5 MG/3ML
2.5 SOLUTION RESPIRATORY (INHALATION) EVERY 6 HOURS PRN
Qty: 1 BOX | Refills: 0 | Status: SHIPPED | OUTPATIENT
Start: 2019-10-28 | End: 2020-03-09

## 2019-10-28 NOTE — PROGRESS NOTES
Chief Complaint:   Patient presents with:  Physical: Annual physical     HPI:   This is a 51year old male who present for a yearly annual exam    WELL-MALE EXAM     1. Age:   51   2. Have you had any of the following problems:         a.  High bloo Sleep apnea      Past Surgical History:   Procedure Laterality Date   • THROAT SURGERY PROCEDURE UNLISTED      cyst in throat     Social History:  Social History    Tobacco Use      Smoking status: Former Smoker      Smokeless tobacco: Never Used    Alcoho 0  isosorbide dinitrate 20 MG Oral Tab, Take 1 tablet (20 mg total) by mouth 3 (three) times daily. , Disp: 90 tablet, Rfl: 5  carvedilol 25 MG Oral Tab, Take 25 mg by mouth 2 (two) times daily with meals. , Disp: , Rfl:   HydrALAZINE HCl 100 MG Oral Tab, Ta Take 1 tablet (20 mg total) by mouth nightly. Dispense: 90 tablet; Refill: 0    4. Obstructive sleep apnea syndrome  - PULMONARY - INTERNAL    5.  Need for Tdap vaccination  - TETANUS, DIPHTHERIA TOXOIDS AND ACELLULAR PERTUSIS VACCINE (TDAP), >7 YEARS, IM healthy weight and healthy BMI  Immunizations reviewed and updated  TDAP up to date  The patient indicates understanding of these issues and agrees to the plan.   The patient is asked  to return yearly for annual preventative health exam.  Tetanus, Diptheri

## 2019-10-28 NOTE — PATIENT INSTRUCTIONS
Thank you for choosing [facility]  To Do:  FOR RICKIE STROUD    · Continue with Metformin  · Start Jardiance 25 mg daily  · Arrange for diabetic education, [facility]  · Arrange for DM eye exam, Dr. Moraima Castro  · Blood sugar checks 3 times a day Vaccine recommended. Tetanus, Diptheria and Pertussis vaccine should be given every 7-10 years.   Call or come in if there are concerns regarding domestic abuse, sexually transmitted diseases, alcohol/drug addiction, depression/anxiety issues, or any furth provider   Vision All men in this age group Every 2 to 4 years if no risk factors for eye disease2   Vaccine Who needs it How often   Chickenpox (varicella) All men in this age group who have no record of this infection or vaccine 2 doses; the second dose daily aspirin Men ages 39 to 78 at risk for cardiovascular health problems At routine exams   Use of tobacco and the health effects it can cause All men in this age group Every exam   1[employer] [facility]. Manuela Kahn [address]

## 2019-10-30 ENCOUNTER — TELEPHONE (OUTPATIENT)
Dept: FAMILY MEDICINE CLINIC | Facility: CLINIC | Age: 49
End: 2019-10-30

## 2019-10-30 NOTE — TELEPHONE ENCOUNTER
Not sure I will have pt call his insurance and then get back to us.     lmom for pt asking him to contact his insurance and see if he can find out what is on his formulary ie farxiga or invokana and then to call office back

## 2019-10-30 NOTE — TELEPHONE ENCOUNTER
Zafar Westbrook prescribed by Dr. Dipti Redd is not covered under his insurance plan. Can you change to anything else?   thanks

## 2019-11-14 RX ORDER — ASPIRIN 81 MG/1
TABLET ORAL
Qty: 30 TABLET | Refills: 0 | Status: SHIPPED | OUTPATIENT
Start: 2019-11-14 | End: 2020-12-06 | Stop reason: SDUPTHER

## 2019-11-14 RX ORDER — CARVEDILOL 25 MG/1
25 TABLET ORAL 2 TIMES DAILY WITH MEALS
Qty: 30 TABLET | Refills: 0 | Status: SHIPPED | OUTPATIENT
Start: 2019-11-14 | End: 2021-04-09

## 2019-11-20 RX ORDER — HYDRALAZINE HYDROCHLORIDE 100 MG/1
100 TABLET, FILM COATED ORAL 3 TIMES DAILY
Qty: 45 TABLET | Refills: 0 | Status: SHIPPED | OUTPATIENT
Start: 2019-11-20 | End: 2021-04-09

## 2019-11-20 RX ORDER — DIGOXIN 250 MCG
250 TABLET ORAL DAILY
Qty: 15 TABLET | Refills: 0 | Status: SHIPPED | OUTPATIENT
Start: 2019-11-20 | End: 2021-04-07

## 2020-01-23 ENCOUNTER — APPOINTMENT (OUTPATIENT)
Dept: GENERAL RADIOLOGY | Facility: HOSPITAL | Age: 50
DRG: 193 | End: 2020-01-23
Attending: EMERGENCY MEDICINE
Payer: COMMERCIAL

## 2020-01-23 ENCOUNTER — HOSPITAL ENCOUNTER (INPATIENT)
Facility: HOSPITAL | Age: 50
LOS: 3 days | Discharge: HOME | DRG: 193 | End: 2020-01-26
Attending: EMERGENCY MEDICINE | Admitting: HOSPITALIST
Payer: COMMERCIAL

## 2020-01-23 DIAGNOSIS — J11.1 INFLUENZA: ICD-10-CM

## 2020-01-23 DIAGNOSIS — J18.9 COMMUNITY ACQUIRED PNEUMONIA, UNSPECIFIED LATERALITY: Primary | ICD-10-CM

## 2020-01-23 DIAGNOSIS — R09.02 HYPOXIA: ICD-10-CM

## 2020-01-23 PROBLEM — I50.22 CHRONIC SYSTOLIC CHF (CONGESTIVE HEART FAILURE) (HCC): Status: ACTIVE | Noted: 2018-04-25

## 2020-01-23 LAB
ADENOVIRUS PCR:: NEGATIVE
ALBUMIN SERPL-MCNC: 3.1 G/DL (ref 3.4–5)
ALBUMIN/GLOB SERPL: 0.7 {RATIO} (ref 1–2)
ALP LIVER SERPL-CCNC: 51 U/L (ref 45–117)
ALT SERPL-CCNC: 20 U/L (ref 16–61)
ANION GAP SERPL CALC-SCNC: 5 MMOL/L (ref 0–18)
AST SERPL-CCNC: 23 U/L (ref 15–37)
ATRIAL RATE: 101 BPM
B PERT DNA SPEC QL NAA+PROBE: NEGATIVE
BASOPHILS # BLD AUTO: 0.03 X10(3) UL (ref 0–0.2)
BASOPHILS NFR BLD AUTO: 0.4 %
BILIRUB SERPL-MCNC: 0.9 MG/DL (ref 0.1–2)
BUN BLD-MCNC: 14 MG/DL (ref 7–18)
BUN/CREAT SERPL: 14.9 (ref 10–20)
C PNEUM DNA SPEC QL NAA+PROBE: NEGATIVE
CALCIUM BLD-MCNC: 8.6 MG/DL (ref 8.5–10.1)
CHLORIDE SERPL-SCNC: 102 MMOL/L (ref 98–112)
CO2 SERPL-SCNC: 29 MMOL/L (ref 21–32)
CORONAVIRUS 229E PCR:: NEGATIVE
CORONAVIRUS HKU1 PCR:: NEGATIVE
CORONAVIRUS NL63 PCR:: NEGATIVE
CORONAVIRUS OC43 PCR:: NEGATIVE
CREAT BLD-MCNC: 0.94 MG/DL (ref 0.7–1.3)
DEPRECATED RDW RBC AUTO: 40.3 FL (ref 35.1–46.3)
EOSINOPHIL # BLD AUTO: 0.19 X10(3) UL (ref 0–0.7)
EOSINOPHIL NFR BLD AUTO: 2.4 %
ERYTHROCYTE [DISTWIDTH] IN BLOOD BY AUTOMATED COUNT: 14.3 % (ref 11–15)
FLUAV H1 2009 PAND RNA SPEC QL NAA+PROBE: POSITIVE
FLUBV RNA SPEC QL NAA+PROBE: NEGATIVE
GLOBULIN PLAS-MCNC: 4.3 G/DL (ref 2.8–4.4)
GLUCOSE BLD-MCNC: 191 MG/DL (ref 70–99)
GLUCOSE BLD-MCNC: 248 MG/DL (ref 70–99)
GLUCOSE BLD-MCNC: 248 MG/DL (ref 70–99)
HCT VFR BLD AUTO: 37.9 % (ref 39–53)
HGB BLD-MCNC: 12.8 G/DL (ref 13–17.5)
IMM GRANULOCYTES # BLD AUTO: 0.04 X10(3) UL (ref 0–1)
IMM GRANULOCYTES NFR BLD: 0.5 %
LACTATE SERPL-SCNC: 1 MMOL/L (ref 0.4–2)
LYMPHOCYTES # BLD AUTO: 0.55 X10(3) UL (ref 1–4)
LYMPHOCYTES NFR BLD AUTO: 6.9 %
M PROTEIN MFR SERPL ELPH: 7.4 G/DL (ref 6.4–8.2)
MCH RBC QN AUTO: 26.5 PG (ref 26–34)
MCHC RBC AUTO-ENTMCNC: 33.8 G/DL (ref 31–37)
MCV RBC AUTO: 78.5 FL (ref 80–100)
METAPNEUMOVIRUS PCR:: NEGATIVE
MONOCYTES # BLD AUTO: 0.42 X10(3) UL (ref 0.1–1)
MONOCYTES NFR BLD AUTO: 5.3 %
MYCOPLASMA PNEUMONIA PCR:: NEGATIVE
NEUTROPHILS # BLD AUTO: 6.77 X10 (3) UL (ref 1.5–7.7)
NEUTROPHILS # BLD AUTO: 6.77 X10(3) UL (ref 1.5–7.7)
NEUTROPHILS NFR BLD AUTO: 84.5 %
NT-PROBNP SERPL-MCNC: 324 PG/ML (ref ?–125)
OSMOLALITY SERPL CALC.SUM OF ELEC: 291 MOSM/KG (ref 275–295)
P AXIS: 43 DEGREES
P-R INTERVAL: 200 MS
PARAINFLUENZA 1 PCR:: NEGATIVE
PARAINFLUENZA 2 PCR:: NEGATIVE
PARAINFLUENZA 3 PCR:: NEGATIVE
PARAINFLUENZA 4 PCR:: NEGATIVE
PLATELET # BLD AUTO: 217 10(3)UL (ref 150–450)
POTASSIUM SERPL-SCNC: 3.8 MMOL/L (ref 3.5–5.1)
PROCALCITONIN SERPL-MCNC: 0.28 NG/ML
Q-T INTERVAL: 384 MS
QRS DURATION: 126 MS
QTC CALCULATION (BEZET): 497 MS
R AXIS: -32 DEGREES
RBC # BLD AUTO: 4.83 X10(6)UL (ref 4.3–5.7)
RHINOVIRUS/ENTERO PCR:: NEGATIVE
RSV RNA SPEC QL NAA+PROBE: NEGATIVE
SODIUM SERPL-SCNC: 136 MMOL/L (ref 136–145)
T AXIS: 87 DEGREES
TROPONIN I SERPL-MCNC: 0.1 NG/ML (ref ?–0.04)
VENTRICULAR RATE: 101 BPM
WBC # BLD AUTO: 8 X10(3) UL (ref 4–11)

## 2020-01-23 PROCEDURE — 99223 1ST HOSP IP/OBS HIGH 75: CPT | Performed by: INTERNAL MEDICINE

## 2020-01-23 PROCEDURE — 71046 X-RAY EXAM CHEST 2 VIEWS: CPT | Performed by: EMERGENCY MEDICINE

## 2020-01-23 PROCEDURE — 99254 IP/OBS CNSLTJ NEW/EST MOD 60: CPT | Performed by: INTERNAL MEDICINE

## 2020-01-23 RX ORDER — ASPIRIN 81 MG/1
81 TABLET, CHEWABLE ORAL DAILY
Status: DISCONTINUED | OUTPATIENT
Start: 2020-01-24 | End: 2020-01-26

## 2020-01-23 RX ORDER — TORSEMIDE 20 MG/1
20 TABLET ORAL DAILY
Status: DISCONTINUED | OUTPATIENT
Start: 2020-01-24 | End: 2020-01-26

## 2020-01-23 RX ORDER — ISOSORBIDE DINITRATE 20 MG/1
20 TABLET ORAL
Status: DISCONTINUED | OUTPATIENT
Start: 2020-01-23 | End: 2020-01-26

## 2020-01-23 RX ORDER — OSELTAMIVIR PHOSPHATE 75 MG/1
75 CAPSULE ORAL EVERY 12 HOURS SCHEDULED
Status: DISCONTINUED | OUTPATIENT
Start: 2020-01-23 | End: 2020-01-26

## 2020-01-23 RX ORDER — DIGOXIN 250 MCG
0.25 TABLET ORAL NIGHTLY
Status: DISCONTINUED | OUTPATIENT
Start: 2020-01-23 | End: 2020-01-26

## 2020-01-23 RX ORDER — ROSUVASTATIN CALCIUM 20 MG/1
20 TABLET, COATED ORAL NIGHTLY
Status: DISCONTINUED | OUTPATIENT
Start: 2020-01-23 | End: 2020-01-26

## 2020-01-23 RX ORDER — ACETAMINOPHEN 500 MG
1000 TABLET ORAL ONCE
Status: COMPLETED | OUTPATIENT
Start: 2020-01-23 | End: 2020-01-23

## 2020-01-23 RX ORDER — CODEINE PHOSPHATE AND GUAIFENESIN 10; 100 MG/5ML; MG/5ML
5 SOLUTION ORAL EVERY 4 HOURS PRN
Status: DISCONTINUED | OUTPATIENT
Start: 2020-01-23 | End: 2020-01-23

## 2020-01-23 RX ORDER — ONDANSETRON 2 MG/ML
4 INJECTION INTRAMUSCULAR; INTRAVENOUS EVERY 6 HOURS PRN
Status: DISCONTINUED | OUTPATIENT
Start: 2020-01-23 | End: 2020-01-26

## 2020-01-23 RX ORDER — METOCLOPRAMIDE HYDROCHLORIDE 5 MG/ML
10 INJECTION INTRAMUSCULAR; INTRAVENOUS EVERY 8 HOURS PRN
Status: DISCONTINUED | OUTPATIENT
Start: 2020-01-23 | End: 2020-01-26

## 2020-01-23 RX ORDER — IPRATROPIUM BROMIDE AND ALBUTEROL SULFATE 2.5; .5 MG/3ML; MG/3ML
3 SOLUTION RESPIRATORY (INHALATION) ONCE
Status: COMPLETED | OUTPATIENT
Start: 2020-01-23 | End: 2020-01-23

## 2020-01-23 RX ORDER — BENZONATATE 100 MG/1
100 CAPSULE ORAL 3 TIMES DAILY PRN
Status: DISCONTINUED | OUTPATIENT
Start: 2020-01-23 | End: 2020-01-26

## 2020-01-23 RX ORDER — SPIRONOLACTONE 25 MG/1
50 TABLET ORAL DAILY
Status: DISCONTINUED | OUTPATIENT
Start: 2020-01-23 | End: 2020-01-26

## 2020-01-23 RX ORDER — HYDRALAZINE HYDROCHLORIDE 50 MG/1
100 TABLET, FILM COATED ORAL 3 TIMES DAILY
Status: DISCONTINUED | OUTPATIENT
Start: 2020-01-23 | End: 2020-01-26

## 2020-01-23 RX ORDER — ACETAMINOPHEN 325 MG/1
650 TABLET ORAL EVERY 6 HOURS PRN
Status: DISCONTINUED | OUTPATIENT
Start: 2020-01-23 | End: 2020-01-26

## 2020-01-23 RX ORDER — SPIRONOLACTONE 25 MG/1
50 TABLET ORAL 2 TIMES DAILY
COMMUNITY

## 2020-01-23 RX ORDER — ALBUTEROL SULFATE 2.5 MG/3ML
2.5 SOLUTION RESPIRATORY (INHALATION) EVERY 6 HOURS PRN
Status: DISCONTINUED | OUTPATIENT
Start: 2020-01-23 | End: 2020-01-26

## 2020-01-23 RX ORDER — DEXTROSE MONOHYDRATE 25 G/50ML
50 INJECTION, SOLUTION INTRAVENOUS
Status: DISCONTINUED | OUTPATIENT
Start: 2020-01-23 | End: 2020-01-26

## 2020-01-23 RX ORDER — HEPARIN SODIUM 5000 [USP'U]/ML
7500 INJECTION, SOLUTION INTRAVENOUS; SUBCUTANEOUS EVERY 8 HOURS SCHEDULED
Status: DISCONTINUED | OUTPATIENT
Start: 2020-01-23 | End: 2020-01-25

## 2020-01-23 RX ORDER — TORSEMIDE 20 MG/1
20 TABLET ORAL
Status: DISCONTINUED | OUTPATIENT
Start: 2020-01-23 | End: 2020-01-25

## 2020-01-23 RX ORDER — CARVEDILOL 12.5 MG/1
25 TABLET ORAL 2 TIMES DAILY WITH MEALS
Status: DISCONTINUED | OUTPATIENT
Start: 2020-01-23 | End: 2020-01-26

## 2020-01-23 RX ORDER — TEMAZEPAM 15 MG/1
15 CAPSULE ORAL NIGHTLY PRN
Status: DISCONTINUED | OUTPATIENT
Start: 2020-01-23 | End: 2020-01-26

## 2020-01-23 NOTE — H&P
[facility] HOSPITALIST  History and Physical     [facility] Patient Status:  Inpatient    1970 MRN [id number]   [facility] 7NE-A Attending Petra Proctor MD   Hosp Day # 0 PCP Lorelei Smith MD     Chief Complaint: SOB    History of morning., Disp: , Rfl:   spironolactone 25 MG Oral Tab, Take 50 mg by mouth daily. , Disp: , Rfl:   Sacubitril-Valsartan (ENTRESTO) 49-51 MG Oral Tab, Take 1 tablet by mouth 2 (two) times daily.  Lot #: [id number] Exp: 12/2019, Disp: 14 tablet, Rfl: 0  albuterol Normocephalic atraumatic. Moist mucous membranes. EOM-I. PERRLA. Anicteric. Neck: No lymphadenopathy. No JVD. No carotid bruits. Respiratory: decreased bs  Cardiovascular: S1, S2. Regular rate and rhythm. No murmurs, rubs or gallops. Equal pulses.    Ches

## 2020-01-23 NOTE — PROGRESS NOTES
[facility] Note: Antimicrobial Weight Based Dose Adjustment for: ceftriaxone (Claryce Parviz)    Brad Cleveland is a 51year old male who has been prescribed ceftriaxone (ROCEPHIN) 1g one time in ED.     Estimated Creatinine Clearance: 108.6 mL/min (based on SCr o

## 2020-01-23 NOTE — ED PROVIDER NOTES
Patient Seen in: [facility] Emergency Department      History   Patient presents with:  Dyspnea FLORIDALMA SOB    Stated Complaint: shortness of breath, CHF, cough congestion    HPI    43-year-old -American male with a history of congestive heart fa Wt (!) 149.7 kg   SpO2 94%   BMI 43.54 kg/m²         Physical Exam    Well-developed well-nourished male who is sitting on the gurney, he is awake and alert and appears in no acute discomfort or distress.     Vital signs are stable he is febrile with a temp Abnormality         Status                     ---------                               -----------         ------                     CBC W/ DIFFERENTIAL[[id number]]          Abnormal            Final result                 Please view results for these t BNP is only slightly elevated 324. I did speak to his cardiologist and we discussed the case and they do not recommend him getting sepsis fluid bolus because of his tenuous cardiac status.   Patient been hemodynamically stable here in the emergency room a

## 2020-01-23 NOTE — ED NOTES
Pt sitting up on stretcher stating he does not feel any better and feels sob oxygen sats 85% intermittently on RA.  Oxygen applied @4L per n/c

## 2020-01-24 PROBLEM — Z99.89 OSA ON CPAP: Status: ACTIVE | Noted: 2018-04-25

## 2020-01-24 PROBLEM — G47.33 OSA ON CPAP: Status: ACTIVE | Noted: 2018-04-25

## 2020-01-24 LAB
ANION GAP SERPL CALC-SCNC: 6 MMOL/L (ref 0–18)
BASOPHILS # BLD AUTO: 0.01 X10(3) UL (ref 0–0.2)
BASOPHILS NFR BLD AUTO: 0.2 %
BILIRUB UR QL STRIP.AUTO: NEGATIVE
BUN BLD-MCNC: 13 MG/DL (ref 7–18)
BUN/CREAT SERPL: 14.1 (ref 10–20)
CALCIUM BLD-MCNC: 8.3 MG/DL (ref 8.5–10.1)
CHLORIDE SERPL-SCNC: 101 MMOL/L (ref 98–112)
CLARITY UR REFRACT.AUTO: CLEAR
CO2 SERPL-SCNC: 30 MMOL/L (ref 21–32)
CREAT BLD-MCNC: 0.92 MG/DL (ref 0.7–1.3)
DEPRECATED RDW RBC AUTO: 41.8 FL (ref 35.1–46.3)
EOSINOPHIL # BLD AUTO: 0.05 X10(3) UL (ref 0–0.7)
EOSINOPHIL NFR BLD AUTO: 0.8 %
ERYTHROCYTE [DISTWIDTH] IN BLOOD BY AUTOMATED COUNT: 14.6 % (ref 11–15)
GLUCOSE BLD-MCNC: 205 MG/DL (ref 70–99)
GLUCOSE BLD-MCNC: 224 MG/DL (ref 70–99)
GLUCOSE BLD-MCNC: 226 MG/DL (ref 70–99)
GLUCOSE BLD-MCNC: 242 MG/DL (ref 70–99)
GLUCOSE BLD-MCNC: 289 MG/DL (ref 70–99)
GLUCOSE UR STRIP.AUTO-MCNC: NEGATIVE MG/DL
HCT VFR BLD AUTO: 36 % (ref 39–53)
HGB BLD-MCNC: 12.1 G/DL (ref 13–17.5)
IMM GRANULOCYTES # BLD AUTO: 0.01 X10(3) UL (ref 0–1)
IMM GRANULOCYTES NFR BLD: 0.2 %
KETONES UR STRIP.AUTO-MCNC: NEGATIVE MG/DL
LEUKOCYTE ESTERASE UR QL STRIP.AUTO: NEGATIVE
LYMPHOCYTES # BLD AUTO: 0.59 X10(3) UL (ref 1–4)
LYMPHOCYTES NFR BLD AUTO: 9.6 %
MCH RBC QN AUTO: 26.6 PG (ref 26–34)
MCHC RBC AUTO-ENTMCNC: 33.6 G/DL (ref 31–37)
MCV RBC AUTO: 79.1 FL (ref 80–100)
MONOCYTES # BLD AUTO: 0.38 X10(3) UL (ref 0.1–1)
MONOCYTES NFR BLD AUTO: 6.2 %
NEUTROPHILS # BLD AUTO: 5.1 X10 (3) UL (ref 1.5–7.7)
NEUTROPHILS # BLD AUTO: 5.1 X10(3) UL (ref 1.5–7.7)
NEUTROPHILS NFR BLD AUTO: 83 %
NITRITE UR QL STRIP.AUTO: NEGATIVE
OSMOLALITY SERPL CALC.SUM OF ELEC: 291 MOSM/KG (ref 275–295)
PH UR STRIP.AUTO: 5 [PH] (ref 4.5–8)
PLATELET # BLD AUTO: 175 10(3)UL (ref 150–450)
POTASSIUM SERPL-SCNC: 3.2 MMOL/L (ref 3.5–5.1)
POTASSIUM SERPL-SCNC: 3.7 MMOL/L (ref 3.5–5.1)
PROT UR STRIP.AUTO-MCNC: NEGATIVE MG/DL
RBC # BLD AUTO: 4.55 X10(6)UL (ref 4.3–5.7)
RBC UR QL AUTO: NEGATIVE
SODIUM SERPL-SCNC: 137 MMOL/L (ref 136–145)
SP GR UR STRIP.AUTO: 1.01 (ref 1–1.03)
UROBILINOGEN UR STRIP.AUTO-MCNC: <2 MG/DL
WBC # BLD AUTO: 6.1 X10(3) UL (ref 4–11)

## 2020-01-24 PROCEDURE — 99233 SBSQ HOSP IP/OBS HIGH 50: CPT | Performed by: HOSPITALIST

## 2020-01-24 PROCEDURE — 99232 SBSQ HOSP IP/OBS MODERATE 35: CPT | Performed by: INTERNAL MEDICINE

## 2020-01-24 RX ORDER — POTASSIUM CHLORIDE 20 MEQ/1
40 TABLET, EXTENDED RELEASE ORAL EVERY 4 HOURS
Status: COMPLETED | OUTPATIENT
Start: 2020-01-24 | End: 2020-01-24

## 2020-01-24 RX ORDER — POTASSIUM CHLORIDE 20 MEQ/1
40 TABLET, EXTENDED RELEASE ORAL ONCE
Status: COMPLETED | OUTPATIENT
Start: 2020-01-24 | End: 2020-01-24

## 2020-01-24 NOTE — CONSULTS
[facility]  Cardiology Consultation    Isra Nagy Patient Status:  Inpatient    1970 MRN [id number]   [facility] 7NE-A Attending Kaylee Acosta MD   Hosp Day # 0 PCP Dee Waller MD     Reason for Consultation:  Ch sulfate (VENTOLIN) (2.5 MG/3ML) 0.083% nebulizer solution 2.5 mg, 2.5 mg, Nebulization, Q6H PRN  •  [START ON 1/24/2020] aspirin chewable tab 81 mg, 81 mg, Oral, Daily  •  carvedilol (COREG) tab 25 mg, 25 mg, Oral, BID with meals  •  digoxin (LANOXIN) tab otherwise mention in above HPI. /87 (BP Location: Left arm)   Pulse 91   Temp 99.9 °F (37.7 °C) (Oral)   Resp 15   Ht 6' 1\" (1.854 m)   Wt (!) 330 lb (149.7 kg)   SpO2 95%   BMI 43.54 kg/m²   Temp (24hrs), Av.2 °F (38.4 °C), Min:99.9 °F (37. evaluation of LV diastolic function. 2. Ascending aorta: The ascending aorta was normal.  3. Left atrium: The left atrial volume was normal.  4. Right ventricle:  The cavity size was normal. Systolic function was normal.  5. Pulmonary arteries: Systolic pr

## 2020-01-24 NOTE — PLAN OF CARE
Care assumed at 0730  Patient alert and oriented x4  BP 93/61. Patient complaint of fatigue and weakness. MITCHELL Howard notified. Afternoon hydralazine held. Coughing up yellow sputum  Encouraged use of IS.  Patient achieved 1000  Patient complains of sob wh

## 2020-01-24 NOTE — PAYOR COMM NOTE
--------------  ADMISSION REVIEW     Payor: KORI [facility]  Subscriber #:  [id number]  Authorization Number: [id number]    Admit date: 1/23/20  Admit time: 838 Beatrice Ambriz       Admitting Physician: Rica Jackman MD  Attending Physician:  Judie Vyas MD  Primary Ca limits   PRO BETA NATRIURETIC PEPTIDE - Abnormal; Notable for the following components:    Pro-Beta Natriuretic Peptide 324 (*)     All other components within normal limits   RESPIRATORY PANEL FLU EXPANDED - Abnormal; Notable for the following components: Juanward hospitalist who agrees admit the patient primarily. To be admitted to medical monitored floor for further work-up treatment evaluation for probable influenza, shortness of breath possible pneumonia.     Disposition and Plan     Clinical Impression: never used smokeless tobacco. He reports current alcohol use. He reports that he does not use drugs.     Family History:   Family History   Problem Relation Age of Onset   • Heart Disorder Mother    • Diabetes Mother    • Heart Disorder Maternal Grandmother insulin three times a day, per insulin sliding scale, as needed, Disp: 10 mL, Rfl: 2  [DISCONTINUED] spironolactone 25 MG Oral Tab, Take 2 tablets (50 mg total) by mouth daily.  (Patient taking differently: Take 25 mg by mouth 2 (two) times daily.  ), Disp: cough and dyspnea. He has followed by my colleague Dr. Carmel Burciaga. He reports his dyspnea is more because he has to mouth breath because he feels nasal congestion. He denies chest pain, orthopnea, PND or LE swelling.   He denies palpitations, lightheadedn User    1/23/2020 2136 Given 100 mg Oral Harl Earing, RN      carvedilol (COREG) tab 25 mg     Date Action Dose Route User    1/24/2020 0805 Given 25 mg Oral Lauri Brittle, RN    1/23/2020 2048 Given 25 mg Oral Harl Earing, DANIEL Mann Oral Evelyne Hernandez RN    1/23/2020 2050 Given 75 mg Oral Anila Valderrama RN      Rosuvastatin Calcium (CRESTOR) tab 20 mg     Date Action Dose Route User    1/23/2020 2048 Given 20 mg Oral Anila Valderrama RN      Sacubitril-Valsartan (ENTRESTO) 49-51 MG

## 2020-01-24 NOTE — CM/SW NOTE
Pt is a 52 yo male admitted for influenza and pneumonia. Pt on the pneumonia protocol. Pt is single and lives with his friend Castillo Estrada. Pt has been independent for his adls.   Pt currently on 3-5L 02.  PT to see as pt is currently a fall risk from a recent

## 2020-01-24 NOTE — PROGRESS NOTES
[facility]  Progress Note     Hollie Melanie Patient Status:  Inpatient    1970 MRN [id number]   [facility] 7NE-A Attending Naomie Solitario MD   Hosp Day # 1 PCP Monet Vallejo MD     Chief Complaint:  SOB + flu/ PNA   S:  Ba Compensated   1. Continue home meds  2. Cardiology  To follow  No add w/u needed   4. Chronic Elevated troponin  1. Monitor on tele  5. Ess HTN  6. Dyslipidemia statin   7. Hypokalemia replace/ monitor   8. Morbid obesity   BMI 45   9. DM2  1.  SSI   PLAN :

## 2020-01-24 NOTE — PLAN OF CARE
Assumed care at [address] [facility]. Patient A&Ox4. Tele SR/ST, on 3L NC. CPAP HS. Max temp 101.9. Droplet for flu. Tamiflu started. IV abx as ordered. PRN restoril for sleep. Mild headache. Sputum sent. Will cont to monitor.

## 2020-01-24 NOTE — PAYOR COMM NOTE
--------------  CONTINUED STAY REVIEW    Payor: KORI BUCKNER  Subscriber #:  [id number]  Authorization Number: [id number]    Admit date: 1/23/20  Admit time: 7052 1/24:        [facility]  Progress Note      Chief Complaint:  SOB + flu/ PNA   S:  B ADMINISTERED IN LAST 1 DAY:  acetaminophen (TYLENOL) tab 650 mg     Date Action Dose Route User    1/23/2020 2050 Given 650 mg Oral Delano Mccarty RN      acetaminophen (TYLENOL EXTRA STRENGTH) tab 1,000 mg     Date Action Dose Route User    1/23/2020 150 1-5 Units     Date Action Dose Route User    1/24/2020 0802 Given 3 Units Subcutaneous (Right Upper Arm) Yara Valdivia RN    1/23/2020 2052 Given 3 Units Subcutaneous (Left Upper Arm) Sony Ojeda RN      ipratropium-albuterol (DUONEB) nebulizer solut

## 2020-01-24 NOTE — PROGRESS NOTES
· [facility]      Subjective:  Weak, productive cough nasal congestion    Objective:  Afebrile 93/61  SR NSVT  I/O - 880  BUN/cr 13/0.92 Hgb 12.1    Neuro: awake/alert  HEENT: no JVD  Cardiac: S1 S2 regular  Lungs: few basilar crackles  Abdome thickening and atelectasis at bases and opacities at bases consistent with infectious process rather than real congestion. Patient had normal coronary and arteries angiographically bilateral left heart cath with Dr. Valarie Montoya in June 2018.     Patient al

## 2020-01-24 NOTE — DIETARY NOTE
[facility]   CLINICAL NUTRITION    [facility]     Admitting diagnosis:  Influenza [J11.1]  Hypoxia [R09.02]  Community acquired pneumonia, unspecified laterality [J18.9]    Ht: 185.4 cm (6' 1\")  Wt: (!) 156.3 kg (344 lb 9.3 oz).  This is 187 % of IBW

## 2020-01-24 NOTE — PROGRESS NOTES
[facility] Pharmacy Note:  Anticoagulation Weight Dose Adjustment for heparin    Hollie Navarro is a 51year old male who has been prescribed heparin 5000 units every 8 hours. Estimated Creatinine Clearance: 108.6 mL/min (based on SCr of 0.94 mg/dL).  Body mass

## 2020-01-24 NOTE — RESPIRATORY THERAPY NOTE
EVA : EQUIPMENT USE: DAILY SUMMARY                                            SET MODE:                                          USAGE IN HOURS:                                          90% EXP. PRESSURE(EPAP):

## 2020-01-25 LAB
ANION GAP SERPL CALC-SCNC: 3 MMOL/L (ref 0–18)
BUN BLD-MCNC: 16 MG/DL (ref 7–18)
BUN/CREAT SERPL: 18.2 (ref 10–20)
CALCIUM BLD-MCNC: 8.1 MG/DL (ref 8.5–10.1)
CHLORIDE SERPL-SCNC: 103 MMOL/L (ref 98–112)
CO2 SERPL-SCNC: 31 MMOL/L (ref 21–32)
CREAT BLD-MCNC: 0.88 MG/DL (ref 0.7–1.3)
GLUCOSE BLD-MCNC: 160 MG/DL (ref 70–99)
GLUCOSE BLD-MCNC: 164 MG/DL (ref 70–99)
GLUCOSE BLD-MCNC: 174 MG/DL (ref 70–99)
GLUCOSE BLD-MCNC: 177 MG/DL (ref 70–99)
GLUCOSE BLD-MCNC: 223 MG/DL (ref 70–99)
GLUCOSE BLD-MCNC: 225 MG/DL (ref 70–99)
OSMOLALITY SERPL CALC.SUM OF ELEC: 289 MOSM/KG (ref 275–295)
PLATELET # BLD AUTO: 182 10(3)UL (ref 150–450)
POTASSIUM SERPL-SCNC: 3.7 MMOL/L (ref 3.5–5.1)
SODIUM SERPL-SCNC: 137 MMOL/L (ref 136–145)

## 2020-01-25 PROCEDURE — 99232 SBSQ HOSP IP/OBS MODERATE 35: CPT | Performed by: HOSPITALIST

## 2020-01-25 RX ORDER — SODIUM CHLORIDE 30 MG/ML INHALATION SOLUTION 30 MG/ML
3 SOLUTION INHALANT
Status: DISCONTINUED | OUTPATIENT
Start: 2020-01-25 | End: 2020-01-26

## 2020-01-25 NOTE — PROGRESS NOTES
[facility]  Progress Note     Vinod De Paz Patient Status:  Inpatient    1970 MRN [id number]   [facility] 7NE-A Attending Rosemary Ibarra MD   Hosp Day # 2 PCP Carrol Plummer MD     Chief Complaint:  SOB + flu/ PNA   S:  St morning  3. Chronic Systolic CHF  Compensated   1. Per cards  4. Chronic Elevated troponin  1. Monitor on tele  5. Ess HTN  6. Dyslipidemia statin   7. Hypokalemia replace prn  8. Morbid obesity   BMI 45   9. DM2  1.  SSI   Quality:  · DVT Prophylaxis: HSQ

## 2020-01-25 NOTE — PLAN OF CARE
Assumed care 1900. Pt A&Ox4. NSR on tele. Afebrile. BP in low 100's, cardiology notified and hydralazine held per order. All other BP medications ok to give. Pt states feeling better tonight than previously. CPAP on during night. Denies pain.

## 2020-01-25 NOTE — PHYSICAL THERAPY NOTE
Physical Therapy    Attempted eval this a.m., but in speaking w/ zurdo Barker pt may not require skilled PT intervention at this time. Pt expressed weakness/unsteadiness earlier, but now has been up and moving more demonstrating decent mobility.   Pt is sleep

## 2020-01-25 NOTE — RESPIRATORY THERAPY NOTE
EVA - Equipment Use Daily Summary:  · Set Mode CFLEX  · Usage in hours: 10:35  · 90% Pressure (EPAP) level: 17.0  · 90% Insp Pressure (IPAP):   · AHI: 35.7  · Supplemental Oxygen:   · Comments:

## 2020-01-25 NOTE — PLAN OF CARE
Assumed care @ 0700  2L NC while awake, cpap while asleep. Productive cough present. Scheduled nebs added. Prn neb given x1. Encouraged IS  Scant blood tinge in sputum this morning. Dr. Ana Crandall updated. Sq heparin d/c'd  BP 97/69 after coreg given.  Tor Route

## 2020-01-26 VITALS
RESPIRATION RATE: 15 BRPM | WEIGHT: 315 LBS | DIASTOLIC BLOOD PRESSURE: 55 MMHG | TEMPERATURE: 98 F | SYSTOLIC BLOOD PRESSURE: 103 MMHG | HEIGHT: 73 IN | OXYGEN SATURATION: 96 % | HEART RATE: 79 BPM | BODY MASS INDEX: 41.75 KG/M2

## 2020-01-26 LAB
GLUCOSE BLD-MCNC: 135 MG/DL (ref 70–99)
GLUCOSE BLD-MCNC: 141 MG/DL (ref 70–99)
PROCALCITONIN SERPL-MCNC: 0.4 NG/ML

## 2020-01-26 PROCEDURE — 99239 HOSP IP/OBS DSCHRG MGMT >30: CPT | Performed by: HOSPITALIST

## 2020-01-26 RX ORDER — DOXYCYCLINE HYCLATE 100 MG/1
100 CAPSULE ORAL 2 TIMES DAILY
Qty: 8 CAPSULE | Refills: 0 | Status: SHIPPED | OUTPATIENT
Start: 2020-01-26 | End: 2020-01-30

## 2020-01-26 RX ORDER — OSELTAMIVIR PHOSPHATE 75 MG/1
75 CAPSULE ORAL EVERY 12 HOURS SCHEDULED
Qty: 4 CAPSULE | Refills: 0 | Status: SHIPPED | OUTPATIENT
Start: 2020-01-26 | End: 2020-01-28

## 2020-01-26 NOTE — PLAN OF CARE
Assumed care 1900. Pt A&Ox4. Ambulating well in room. Denies pain. 1500 fluid restriction maintained. Pt on 2L O2 over night. Down to 1L this AM.   Pt requesting PRN neb this AM. Respiratory called.   Productive cough with yellow sputum   Encouraged

## 2020-01-26 NOTE — RESPIRATORY THERAPY NOTE
EVA - Equipment Use Daily Summary:  · Set Mode CFLEX  · Usage in hours: 0:40  · 90% Pressure (EPAP) level: 12.0  · 90% Insp Pressure (IPAP):   · AHI: 26.4  · Supplemental Oxygen:   · Comments:

## 2020-01-26 NOTE — PHYSICAL THERAPY NOTE
Orders received and hx and chart reviewed. Per RN, Deborah Mcelroy, pt is not needing skilled therapy services and will be D/C'd home later today.

## 2020-01-26 NOTE — PLAN OF CARE
Assumed pt care at 0730  VSS- NSR on tele  Pt complaint of pain 5/10 in hand- tylenol given with relief  Pt up ad sukhjinder    Tele removed  IV removed    Education, medications and follow-ups reviewed with pt  All questions and concerned addressed  Pt verbalize Assess for changes in respiratory status  - Assess for changes in mentation and behavior  - Position to facilitate oxygenation and minimize respiratory effort  - Oxygen supplementation based on oxygen saturation or ABGs  - Provide Smoking Cessation handout

## 2020-01-26 NOTE — DISCHARGE SUMMARY
[facility] [place]  DISCHARGE SUMMARY     Nima Whitley Patient Status:  Inpatient    1970 MRN [id number]   [facility] 7NE-A Attending Javon Fleming MD   Hosp Day # 3 PCP Johana Belle MD     Date of Admission: 2020  Date of D (100 mg total) by mouth 2 (two) times daily for 4 days. Take with full glass of water and do not lay down flat for at least 45 minutes after you take this medication, as it can irritate the esophagus.    Stop taking on:  January 30, 2020  Quantity:  8 capsu Take 25 mg by mouth nightly. Refills:  0     metFORMIN HCl 1000 MG Tabs  Commonly known as:  GLUCOPHAGE      Take 1 tablet (1,000 mg total) by mouth 2 (two) times daily with meals.    Quantity:  180 tablet  Refills:  0     Rosuvastatin Calcium 20 MG Ta edema.  -----------------------------------------------------------------------------------------------  PATIENT DISCHARGE INSTRUCTIONS: See electronic chart    Farzaneh Mathias MD    Time spent:  > 30 minutes

## 2020-01-26 NOTE — PROGRESS NOTES
[facility] [address]  [place], [address]  ?  01/26/20  ? Re: Denita Sanchez  ? To Whom It May Concern:    Denita Sanchez was admitted to [facility] from 1/23/2020 to 01/26/20.     Please excuse Denita Sanchez from attending work for thes

## 2020-01-26 NOTE — PROGRESS NOTES
Patient seen and examined. Medically cleared for discharge, if all involved specialists' consent for discharge.     Mckenzie Matson MD  [facility]  Internal Medicine Hospitalist  Cell [phone]

## 2020-01-27 ENCOUNTER — PATIENT OUTREACH (OUTPATIENT)
Dept: CASE MANAGEMENT | Age: 50
End: 2020-01-27

## 2020-01-27 NOTE — PAYOR COMM NOTE
--------------  DISCHARGE REVIEW    Payor: KORI BUCKNER  Subscriber #:  [id number]  Authorization Number: [id number]    Admit date: 1/23/20  Admit time:  6064  Discharge Date: 1/26/2020  3:33 PM     Admitting Physician: Stacia Rice MD  Attending Arnulfo Crane Score: 55  59-90 High Risk  29-58 Medium Risk  0-28   Low Risk  Patient was referred to the [facility]. TCM Follow-Up Recommendation:  LACE 29-58:  Moderate Risk of readmission after discharge from the hospital.    Procedures sara times daily with meals. Refills:  0     digoxin 0.25 MG Tabs  Commonly known as:  LANOXIN      Take 0.25 mg by mouth nightly. Refills:  0     hydrALAZINE HCl 100 MG Tabs  Commonly known as:  APRESOLINE      Take 100 mg by mouth 3 (three) times daily. EULALIA Hunter    Patient Instructions:                          Vital signs:  Temp:  [98.2 °F (36.8 °C)-99.4 °F (37.4 °C)] 98.2 °F (36.8 °C)  Pulse:  [75-81] 79  Resp:  [14-20] 15  BP: ()/(55-84) 103/55    Physical Exam:    General: No acu

## 2020-01-27 NOTE — PAYOR COMM NOTE
--------------  CONTINUED STAY REVIEW    Payor: KORI BUCKNER  Subscriber #:  [id number]  Authorization Number: [id number]    Admit date: 1/23/20  Admit time: 0117 1/24:    CARDS:  Subjective:  Weak, productive cough nasal congestion     Objective:  Kelvin Sun 0.9 with potassium of 3.8. Hemoglobin 12. 1.     Stable sinus in telemetry. No acute ischemia on EKG.   No real congestion on x-ray but more peribronchial thickening and atelectasis at bases and opacities at bases consistent with infectious process rather 01/24/20  0557 01/24/20  1740 01/25/20  0658   * 226*  --  164*   BUN 14 13  --  16   CREATSERUM 0.94 0.92  --  0.88   GFRAA 110 113  --  117   GFRNAA 95 98  --  102   CA 8.6 8.3*  --  8.1*   ALB 3.1*  --   --   --     137  --  137   K 3.8 3.2 Discharged on 1/26/2020 1/26/2020 0291 Given 650 mg Oral [place] Garg RN      aspirin chewable tab 81 mg     Date Action Dose Route User    Discharged on 1/26/2020 1/26/2020 0829 Given 81 mg Oral Kiya Garg RN      carvedilol (COREG) ta

## 2020-02-10 NOTE — PROGRESS NOTES
Attempted to reach the patient to complete [facility] FU call. Left a message for the pt to call NCM back at, [phone]. The number for the [facility] was also given to the patient to schedule at, [phone].

## 2020-02-24 ENCOUNTER — ANCILLARY ORDERS (OUTPATIENT)
Dept: CARDIOLOGY | Age: 50
End: 2020-02-24

## 2020-02-24 ENCOUNTER — ANCILLARY PROCEDURE (OUTPATIENT)
Dept: CARDIOLOGY | Age: 50
End: 2020-02-24
Attending: INTERNAL MEDICINE

## 2020-02-24 DIAGNOSIS — Z95.810 ICD (IMPLANTABLE CARDIOVERTER-DEFIBRILLATOR) IN PLACE: ICD-10-CM

## 2020-02-24 PROCEDURE — X1114 CARDIAC DEVICE HOME CHECK - REMOTE UNSCHEDULED: HCPCS | Performed by: INTERNAL MEDICINE

## 2020-03-01 DIAGNOSIS — I50.22 CHRONIC SYSTOLIC CONGESTIVE HEART FAILURE (HCC): ICD-10-CM

## 2020-03-01 DIAGNOSIS — J45.20 MILD INTERMITTENT ASTHMA, UNSPECIFIED WHETHER COMPLICATED: ICD-10-CM

## 2020-03-02 NOTE — TELEPHONE ENCOUNTER
Medication(s) to Refill:   Requested Prescriptions     Pending Prescriptions Disp Refills   • ALBUTEROL SULFATE (2.5 MG/3ML) 0.083% Inhalation Nebu Soln [Pharmacy Med Name: ALBUTEROL 0.083%(2.5MG/3ML) 25X3ML] 75 mL 0     Sig: USE 1 VIAL VIA NEBULIZER EVERY

## 2020-03-08 DIAGNOSIS — J45.20 MILD INTERMITTENT ASTHMA, UNSPECIFIED WHETHER COMPLICATED: ICD-10-CM

## 2020-03-09 ENCOUNTER — TELEPHONE (OUTPATIENT)
Dept: FAMILY MEDICINE CLINIC | Facility: CLINIC | Age: 50
End: 2020-03-09

## 2020-03-09 RX ORDER — ALBUTEROL SULFATE 2.5 MG/3ML
SOLUTION RESPIRATORY (INHALATION)
Qty: 75 ML | Refills: 0 | Status: SHIPPED | OUTPATIENT
Start: 2020-03-09 | End: 2020-03-27

## 2020-03-09 NOTE — TELEPHONE ENCOUNTER
Pharmacy calling in, pharmacy would like to know if pt can have a refill on his Empagliflozin (JARDIANCE) 25 MG Oral Tab and torsemide 20 MG Oral Tab.  Sent to the [address] [facility] AT [address] [place] [address]

## 2020-03-09 NOTE — TELEPHONE ENCOUNTER
PSR please call patient to schedule him an OV for his diabetes and med refill. He is overdue, thanks. TE 10/30/19 Jardiance not covered by his insurance.  Patient was supposed to contact his insurance to see what is covered and he never returned our call

## 2020-03-09 NOTE — TELEPHONE ENCOUNTER
Tried calling pt to schedule a F/U appt with Dr. Gregory Gao. Left general VM for pt to call office back.

## 2020-03-15 RX ORDER — TORSEMIDE 20 MG/1
TABLET ORAL
Qty: 180 TABLET | Refills: 0 | Status: SHIPPED | OUTPATIENT
Start: 2020-03-15 | End: 2021-12-23

## 2020-03-15 RX ORDER — EMPAGLIFLOZIN 25 MG/1
TABLET, FILM COATED ORAL
Qty: 90 TABLET | Refills: 0 | Status: SHIPPED | OUTPATIENT
Start: 2020-03-15 | End: 2020-09-21

## 2020-03-15 RX ORDER — CARVEDILOL 25 MG/1
TABLET ORAL
Qty: 180 TABLET | Refills: 0 | Status: SHIPPED | OUTPATIENT
Start: 2020-03-15 | End: 2020-06-29

## 2020-03-15 RX ORDER — ASPIRIN 81 MG/1
TABLET, COATED ORAL
Qty: 90 TABLET | Refills: 0 | Status: SHIPPED | OUTPATIENT
Start: 2020-03-15 | End: 2020-06-29

## 2020-03-15 RX ORDER — ALBUTEROL SULFATE 2.5 MG/3ML
SOLUTION RESPIRATORY (INHALATION)
Qty: 75 ML | Refills: 0 | OUTPATIENT
Start: 2020-03-15

## 2020-03-15 RX ORDER — DIGOXIN 250 UG/1
TABLET ORAL
Qty: 90 TABLET | Refills: 0 | Status: SHIPPED | OUTPATIENT
Start: 2020-03-15 | End: 2020-10-13

## 2020-03-15 RX ORDER — HYDRALAZINE HYDROCHLORIDE 100 MG/1
TABLET, FILM COATED ORAL
Qty: 270 TABLET | Refills: 0 | Status: SHIPPED | OUTPATIENT
Start: 2020-03-15 | End: 2020-09-05

## 2020-03-19 ENCOUNTER — TELEPHONE (OUTPATIENT)
Dept: FAMILY MEDICINE CLINIC | Facility: CLINIC | Age: 50
End: 2020-03-19

## 2020-03-19 NOTE — TELEPHONE ENCOUNTER
PA for Jardiance was started. Pending Dr. Fan Harris signature and review from RN. Will hand off to RN to complete the PA and fax.

## 2020-03-24 ENCOUNTER — MED REC SCAN ONLY (OUTPATIENT)
Dept: FAMILY MEDICINE CLINIC | Facility: CLINIC | Age: 50
End: 2020-03-24

## 2020-03-26 DIAGNOSIS — E11.65 UNCONTROLLED TYPE 2 DIABETES MELLITUS WITH HYPERGLYCEMIA (HCC): ICD-10-CM

## 2020-03-26 DIAGNOSIS — J45.20 MILD INTERMITTENT ASTHMA, UNSPECIFIED WHETHER COMPLICATED: ICD-10-CM

## 2020-03-26 RX ORDER — ISOSORBIDE DINITRATE 20 MG/1
TABLET ORAL
Qty: 90 TABLET | Refills: 0 | Status: SHIPPED | OUTPATIENT
Start: 2020-03-26 | End: 2020-06-03

## 2020-03-26 RX ORDER — SPIRONOLACTONE 25 MG/1
TABLET ORAL
Qty: 60 TABLET | Refills: 0 | Status: SHIPPED | OUTPATIENT
Start: 2020-03-26 | End: 2020-06-03

## 2020-03-26 NOTE — TELEPHONE ENCOUNTER
Medication(s) to Refill:   Requested Prescriptions     Pending Prescriptions Disp Refills   • METFORMIN HCL 1000 MG Oral Tab [Pharmacy Med Name: METFORMIN 1000MG TABLETS] 180 tablet 0     Sig: TAKE 1 TABLET(1000 MG) BY MOUTH TWICE DAILY WITH MEALS   • NIYA

## 2020-03-27 RX ORDER — ALBUTEROL SULFATE 2.5 MG/3ML
SOLUTION RESPIRATORY (INHALATION)
Qty: 75 ML | Refills: 0 | Status: SHIPPED | OUTPATIENT
Start: 2020-03-27 | End: 2020-12-04

## 2020-04-06 RX ORDER — DIGOXIN 250 MCG
TABLET ORAL
Qty: 90 TABLET | Refills: 0 | OUTPATIENT
Start: 2020-04-06

## 2020-04-06 NOTE — TELEPHONE ENCOUNTER
Medication(s) to Refill:   Requested Prescriptions     Pending Prescriptions Disp Refills   • DIGOXIN 0.25 MG Oral Tab [Pharmacy Med Name: DIGOXIN 0.25MG TABLETS (WHITE)] 90 tablet 0     Sig: TAKE 1 TABLET BY MOUTH DAILY           Last Time Medication was

## 2020-04-16 ENCOUNTER — E-VISIT (OUTPATIENT)
Dept: FAMILY MEDICINE CLINIC | Facility: CLINIC | Age: 50
End: 2020-04-16

## 2020-04-16 DIAGNOSIS — M54.9 ACUTE BACK PAIN, UNSPECIFIED BACK LOCATION, UNSPECIFIED BACK PAIN LATERALITY: ICD-10-CM

## 2020-04-16 DIAGNOSIS — R39.15 URINARY URGENCY: Primary | ICD-10-CM

## 2020-04-20 NOTE — PROGRESS NOTES
Duke Jackson is a 53year old male. HPI:   See answers to questions above.      Current Outpatient Medications   Medication Sig Dispense Refill   • METFORMIN HCL 1000 MG Oral Tab TAKE 1 TABLET(1000 MG) BY MOUTH TWICE DAILY WITH MEALS 180 tablet 0   • ALBUTE cyst in throat      Family History   Problem Relation Age of Onset   • Heart Disorder Mother    • Diabetes Mother    • Heart Disorder Maternal Grandmother    • Hypertension Maternal Grandmother    • Diabetes Maternal Grandmother    • Heart Disorder Ma

## 2020-04-21 ENCOUNTER — TELEPHONE (OUTPATIENT)
Dept: FAMILY MEDICINE CLINIC | Facility: CLINIC | Age: 50
End: 2020-04-21

## 2020-05-07 ENCOUNTER — VIRTUAL PHONE E/M (OUTPATIENT)
Dept: FAMILY MEDICINE CLINIC | Facility: CLINIC | Age: 50
End: 2020-05-07
Payer: COMMERCIAL

## 2020-05-07 DIAGNOSIS — E66.01 MORBID OBESITY WITH BMI OF 45.0-49.9, ADULT (HCC): ICD-10-CM

## 2020-05-07 DIAGNOSIS — E11.65 UNCONTROLLED TYPE 2 DIABETES MELLITUS WITH HYPERGLYCEMIA (HCC): Primary | ICD-10-CM

## 2020-05-07 PROCEDURE — 99213 OFFICE O/P EST LOW 20 MIN: CPT | Performed by: EMERGENCY MEDICINE

## 2020-05-07 NOTE — PROGRESS NOTES
This is a telemedicine visit with live, interactive video and audio. Denita Sanchez verbally consents to a Virtual/Telephone Check-In visit on 05/07/20.     Patient understands and accepts financial responsibility for any deductible, co-insurance and/or co MG Oral Tab TAKE 1 TABLET(1000 MG) BY MOUTH TWICE DAILY WITH MEALS 180 tablet 0   • ALBUTEROL SULFATE (2.5 MG/3ML) 0.083% Inhalation Nebu Soln USE 1 VIAL VIA NEBULIZER EVERY 6 HOURS AS NEEDED FOR WHEEZING 75 mL 0   • JARDIANCE 25 MG Oral Tab TAKE 1 TABLET Will continue with metformin and Jardiance for now await laboratories which are ordered today. In the past he was given an Rx for insulin sliding scale he is encouraged to use this if blood sugars are high.   Encouraged to check blood sugars 3 times a day

## 2020-05-07 NOTE — PATIENT INSTRUCTIONS
Thank you for choosing [facility]  To Do:  FOR RICKIE STROUD    · Check blood sugars regularly, 3 x a day and record.  Administer insulin as below  · Need to lose weight  · Arrange consult with [facility]  · Have blood tests done  · Alejandro

## 2020-06-03 DIAGNOSIS — E78.5 HYPERLIPIDEMIA ASSOCIATED WITH TYPE 2 DIABETES MELLITUS (HCC): ICD-10-CM

## 2020-06-03 DIAGNOSIS — R30.0 BURNING WITH URINATION: ICD-10-CM

## 2020-06-03 DIAGNOSIS — E11.69 HYPERLIPIDEMIA ASSOCIATED WITH TYPE 2 DIABETES MELLITUS (HCC): ICD-10-CM

## 2020-06-03 RX ORDER — SPIRONOLACTONE 25 MG/1
TABLET ORAL
Qty: 60 TABLET | Refills: 0 | Status: SHIPPED | OUTPATIENT
Start: 2020-06-03 | End: 2021-03-29

## 2020-06-03 RX ORDER — ISOSORBIDE DINITRATE 20 MG/1
TABLET ORAL
Qty: 30 TABLET | Refills: 0 | Status: SHIPPED | OUTPATIENT
Start: 2020-06-03 | End: 2021-04-07

## 2020-06-03 RX ORDER — CIPROFLOXACIN 500 MG/1
TABLET, FILM COATED ORAL
Qty: 14 TABLET | Refills: 0 | OUTPATIENT
Start: 2020-06-03

## 2020-06-03 RX ORDER — ROSUVASTATIN CALCIUM 20 MG/1
TABLET, COATED ORAL
Qty: 90 TABLET | Refills: 0 | Status: SHIPPED | OUTPATIENT
Start: 2020-06-03 | End: 2020-12-16 | Stop reason: CLARIF

## 2020-06-03 NOTE — TELEPHONE ENCOUNTER
Medication(s) to Refill:   Requested Prescriptions     Pending Prescriptions Disp Refills   • ROSUVASTATIN CALCIUM 20 MG Oral Tab [Pharmacy Med Name: ROSUVASTATIN 20MG TABLETS] 90 tablet 0     Sig: TAKE 1 TABLET(20 MG) BY MOUTH EVERY NIGHT         Reason f

## 2020-06-16 ENCOUNTER — ANCILLARY ORDERS (OUTPATIENT)
Dept: CARDIOLOGY | Age: 50
End: 2020-06-16

## 2020-06-16 ENCOUNTER — ANCILLARY PROCEDURE (OUTPATIENT)
Dept: CARDIOLOGY | Age: 50
End: 2020-06-16
Attending: INTERNAL MEDICINE

## 2020-06-16 DIAGNOSIS — Z95.810 ICD (IMPLANTABLE CARDIOVERTER-DEFIBRILLATOR) IN PLACE: ICD-10-CM

## 2020-06-16 PROCEDURE — X1114 CARDIAC DEVICE HOME CHECK - REMOTE UNSCHEDULED: HCPCS | Performed by: INTERNAL MEDICINE

## 2020-06-16 PROCEDURE — 93295 DEV INTERROG REMOTE 1/2/MLT: CPT | Performed by: INTERNAL MEDICINE

## 2020-06-16 PROCEDURE — 93296 REM INTERROG EVL PM/IDS: CPT | Performed by: INTERNAL MEDICINE

## 2020-06-29 RX ORDER — ASPIRIN 81 MG/1
TABLET, COATED ORAL
Qty: 90 TABLET | Refills: 0 | Status: SHIPPED | OUTPATIENT
Start: 2020-06-29 | End: 2020-09-21

## 2020-06-29 RX ORDER — CARVEDILOL 25 MG/1
TABLET ORAL
Qty: 180 TABLET | Refills: 0 | Status: SHIPPED | OUTPATIENT
Start: 2020-06-29 | End: 2020-12-16 | Stop reason: CLARIF

## 2020-07-09 ENCOUNTER — PATIENT OUTREACH (OUTPATIENT)
Dept: FAMILY MEDICINE CLINIC | Facility: CLINIC | Age: 50
End: 2020-07-09

## 2020-09-04 NOTE — TELEPHONE ENCOUNTER
Medication(s) to Refill:   Requested Prescriptions     Pending Prescriptions Disp Refills   • HYDRALAZINE  MG Oral Tab [Pharmacy Med Name: HYDRALAZINE 100MG (HUNDRED MG) TABS] 270 tablet 0     Sig: TAKE 1 TABLET BY MOUTH THREE TIMES DAILY         Re

## 2020-09-05 RX ORDER — HYDRALAZINE HYDROCHLORIDE 100 MG/1
TABLET, FILM COATED ORAL
Qty: 270 TABLET | Refills: 0 | Status: ON HOLD | OUTPATIENT
Start: 2020-09-05 | End: 2020-09-25

## 2020-09-21 ENCOUNTER — HOSPITAL ENCOUNTER (INPATIENT)
Facility: HOSPITAL | Age: 50
LOS: 4 days | Discharge: HOME | DRG: 291 | End: 2020-09-25
Attending: EMERGENCY MEDICINE | Admitting: HOSPITALIST
Payer: COMMERCIAL

## 2020-09-21 ENCOUNTER — APPOINTMENT (OUTPATIENT)
Dept: GENERAL RADIOLOGY | Facility: HOSPITAL | Age: 50
DRG: 291 | End: 2020-09-21
Attending: EMERGENCY MEDICINE
Payer: COMMERCIAL

## 2020-09-21 DIAGNOSIS — R77.8 ELEVATED TROPONIN: ICD-10-CM

## 2020-09-21 DIAGNOSIS — I50.9 ACUTE ON CHRONIC CONGESTIVE HEART FAILURE, UNSPECIFIED HEART FAILURE TYPE (HCC): Primary | ICD-10-CM

## 2020-09-21 DIAGNOSIS — Z99.89 OSA ON CPAP: ICD-10-CM

## 2020-09-21 DIAGNOSIS — G47.33 OSA ON CPAP: ICD-10-CM

## 2020-09-21 DIAGNOSIS — E11.9 TYPE 2 DIABETES MELLITUS WITHOUT COMPLICATION, WITHOUT LONG-TERM CURRENT USE OF INSULIN (HCC): Chronic | ICD-10-CM

## 2020-09-21 PROBLEM — E87.1 HYPONATREMIA: Status: ACTIVE | Noted: 2020-09-21

## 2020-09-21 PROBLEM — R73.9 HYPERGLYCEMIA: Status: ACTIVE | Noted: 2020-09-21

## 2020-09-21 PROBLEM — N17.9 ACUTE KIDNEY INJURY (HCC): Status: ACTIVE | Noted: 2020-09-21

## 2020-09-21 PROBLEM — E87.6 HYPOKALEMIA: Status: ACTIVE | Noted: 2020-09-21

## 2020-09-21 PROCEDURE — 99223 1ST HOSP IP/OBS HIGH 75: CPT | Performed by: HOSPITALIST

## 2020-09-21 PROCEDURE — 71045 X-RAY EXAM CHEST 1 VIEW: CPT | Performed by: EMERGENCY MEDICINE

## 2020-09-21 PROCEDURE — 99253 IP/OBS CNSLTJ NEW/EST LOW 45: CPT | Performed by: INTERNAL MEDICINE

## 2020-09-21 RX ORDER — ACETAMINOPHEN 325 MG/1
650 TABLET ORAL EVERY 6 HOURS PRN
Status: DISCONTINUED | OUTPATIENT
Start: 2020-09-21 | End: 2020-09-25

## 2020-09-21 RX ORDER — SODIUM CHLORIDE 9 MG/ML
INJECTION, SOLUTION INTRAVENOUS CONTINUOUS
Status: DISCONTINUED | OUTPATIENT
Start: 2020-09-21 | End: 2020-09-21

## 2020-09-21 RX ORDER — ONDANSETRON 2 MG/ML
4 INJECTION INTRAMUSCULAR; INTRAVENOUS EVERY 6 HOURS PRN
Status: DISCONTINUED | OUTPATIENT
Start: 2020-09-21 | End: 2020-09-25

## 2020-09-21 RX ORDER — METOCLOPRAMIDE HYDROCHLORIDE 5 MG/ML
10 INJECTION INTRAMUSCULAR; INTRAVENOUS EVERY 8 HOURS PRN
Status: DISCONTINUED | OUTPATIENT
Start: 2020-09-21 | End: 2020-09-25

## 2020-09-21 RX ORDER — HYDRALAZINE HYDROCHLORIDE 50 MG/1
100 TABLET, FILM COATED ORAL 3 TIMES DAILY
Status: DISCONTINUED | OUTPATIENT
Start: 2020-09-21 | End: 2020-09-23

## 2020-09-21 RX ORDER — POTASSIUM CHLORIDE 20 MEQ/1
40 TABLET, EXTENDED RELEASE ORAL ONCE
Status: COMPLETED | OUTPATIENT
Start: 2020-09-21 | End: 2020-09-21

## 2020-09-21 RX ORDER — ROSUVASTATIN CALCIUM 20 MG/1
20 TABLET, COATED ORAL NIGHTLY
Status: DISCONTINUED | OUTPATIENT
Start: 2020-09-21 | End: 2020-09-25

## 2020-09-21 RX ORDER — BENZONATATE 100 MG/1
200 CAPSULE ORAL 3 TIMES DAILY PRN
COMMUNITY
End: 2021-04-06 | Stop reason: ALTCHOICE

## 2020-09-21 RX ORDER — DIGOXIN 125 MCG
250 TABLET ORAL DAILY
Status: DISCONTINUED | OUTPATIENT
Start: 2020-09-22 | End: 2020-09-25

## 2020-09-21 RX ORDER — SPIRONOLACTONE 25 MG/1
50 TABLET ORAL 2 TIMES DAILY
Status: DISCONTINUED | OUTPATIENT
Start: 2020-09-21 | End: 2020-09-25

## 2020-09-21 RX ORDER — ASPIRIN 81 MG/1
81 TABLET, CHEWABLE ORAL DAILY
Status: DISCONTINUED | OUTPATIENT
Start: 2020-09-21 | End: 2020-09-25

## 2020-09-21 RX ORDER — DEXTROSE MONOHYDRATE 25 G/50ML
50 INJECTION, SOLUTION INTRAVENOUS
Status: DISCONTINUED | OUTPATIENT
Start: 2020-09-21 | End: 2020-09-25

## 2020-09-21 RX ORDER — CARVEDILOL 12.5 MG/1
25 TABLET ORAL 2 TIMES DAILY WITH MEALS
Status: DISCONTINUED | OUTPATIENT
Start: 2020-09-21 | End: 2020-09-25

## 2020-09-21 RX ORDER — FUROSEMIDE 10 MG/ML
40 INJECTION INTRAMUSCULAR; INTRAVENOUS
Status: DISCONTINUED | OUTPATIENT
Start: 2020-09-21 | End: 2020-09-25

## 2020-09-21 RX ORDER — CHOLECALCIFEROL (VITAMIN D3) 125 MCG
5 CAPSULE ORAL NIGHTLY PRN
COMMUNITY

## 2020-09-21 RX ORDER — ISOSORBIDE DINITRATE 20 MG/1
20 TABLET ORAL 3 TIMES DAILY
Status: DISCONTINUED | OUTPATIENT
Start: 2020-09-21 | End: 2020-09-23

## 2020-09-21 RX ORDER — FUROSEMIDE 10 MG/ML
40 INJECTION INTRAMUSCULAR; INTRAVENOUS ONCE
Status: COMPLETED | OUTPATIENT
Start: 2020-09-21 | End: 2020-09-21

## 2020-09-21 RX ORDER — ONDANSETRON 2 MG/ML
4 INJECTION INTRAMUSCULAR; INTRAVENOUS EVERY 4 HOURS PRN
Status: DISCONTINUED | OUTPATIENT
Start: 2020-09-21 | End: 2020-09-21

## 2020-09-21 RX ORDER — POTASSIUM CHLORIDE 20 MEQ/1
40 TABLET, EXTENDED RELEASE ORAL EVERY 4 HOURS
Status: COMPLETED | OUTPATIENT
Start: 2020-09-21 | End: 2020-09-21

## 2020-09-21 RX ORDER — BENZONATATE 100 MG/1
200 CAPSULE ORAL 3 TIMES DAILY PRN
Status: DISCONTINUED | OUTPATIENT
Start: 2020-09-21 | End: 2020-09-25

## 2020-09-21 NOTE — PROGRESS NOTES
NURSING ADMISSION NOTE      Patient admitted via Cart  Oriented to room. Safety precautions initiated. Bed in low position. Call light in reach. Admitted for hyperglycemia, elevated troponins and heart failure. Admission navigator completed.  Alli Arreola

## 2020-09-21 NOTE — CONSULTS
[facility]    PATIENT'S NAME: RICKIE Mercedes   ATTENDING PHYSICIAN: Kath Montero M.D.   CONSULTING PHYSICIAN: Ernestina Bourgeois M.D.    PATIENT ACCOUNT#:   [de-identified]    LOCATION:  [address] [place] [address]  MEDICAL RECORD #:   [id number]       DATE OF BIRTH:  0 prominent and discomfort noted there. No dysuria. Edema improved with his torsemide over the weekend. No orthopnea, PND. Does use a CPAP mask every night. No fevers, chills, or cough. Rest of review of systems negative x10 systems.        PHYSICAL EXA

## 2020-09-21 NOTE — H&P
[facility]  History and Physical     Danny Sloan Patient Status:  Inpatient    1970 MRN [id number]   [facility] 2NE-A Attending Joby Parson MD   Hosp Day # 0 PCP Sudheer Potter MD     Chief Complaint: SOB     History o Grandmother    • Heart Disorder Maternal Grandfather    • Hypertension Maternal Grandfather    • Diabetes Maternal Grandfather      Allergies:   Bactrim [Sulfametho*    HIVES  Medications:  No current facility-administered medications on file prior to enco Sacubitril-Valsartan (ENTRESTO) 49-51 MG Oral Tab, Take 1 tablet by mouth 2 (two) times daily.  Lot #: [id number] Exp: 12/2019, Disp: 14 tablet, Rfl: 0      Physical Exam:    /80 (BP Location: Left arm)   Pulse 87   Temp 97.7 °F (36.5 °C) (Oral)   Resp 18 AICD  8.  Diabetes II- hyperglycemic protocol    Quality:  · DVT Prophylaxis: Ambulate   · CODE status: Full   · Gooden: No   Plan of care discussed with Patient     Socrates Quiles MD

## 2020-09-21 NOTE — ED PROVIDER NOTES
Patient Seen in: [facility] Emergency Department      History   Patient presents with:  Diabetes  Dizziness    Stated Complaint: DM    HPI    Patient is a pleasant 46-year-old male, with multiple past medical problems, presenting for evaluation of i Exam    Vital signs noted. GENERAL: Patient is awake and alert, supine on the cart, in no apparent distress. HEENT: Head is without evidence of trauma. Extraocular muscles are intact. Pupils are equal and reactive to light.  Oropharynx is pink and moist limits   CBC W/ DIFFERENTIAL - Abnormal; Notable for the following components:    MCV 75.4 (*)     All other components within normal limits   PTT, ACTIVATED - Normal   CBC WITH DIFFERENTIAL WITH PLATELET    Narrative:      The following orders were created Boris Anderson MD on 9/21/2020 at 10:09 AM     Finalized by (CST): José Miguel Vital MD on 9/21/2020 at 10:10 AM           MDM      Patient was placed on a cart, an IV was established, and blood was drawn and sent to the laboratory for further evaluation.  The patient was

## 2020-09-21 NOTE — CONSULTS
Cardiology Consult Note     PRIMARY CARDIOLOGIST: [facility]      CONSULT FOR: Known nonischemic cardiomyopathy EF 15 to 20% range, known normal coronaries follow-up with pulmonary pressures history of nonsustained VT and has an AICD.   Patient admitted with

## 2020-09-21 NOTE — ED NOTES
pts o2 sats dropped to 79% when pt fell asleep, pt states he does have sleep apnea and wears a cpap, placed on o2 at 4ltrs per nc

## 2020-09-22 ENCOUNTER — APPOINTMENT (OUTPATIENT)
Dept: GENERAL RADIOLOGY | Facility: HOSPITAL | Age: 50
DRG: 291 | End: 2020-09-22
Attending: INTERNAL MEDICINE
Payer: COMMERCIAL

## 2020-09-22 ENCOUNTER — APPOINTMENT (OUTPATIENT)
Dept: CV DIAGNOSTICS | Facility: HOSPITAL | Age: 50
DRG: 291 | End: 2020-09-22
Attending: INTERNAL MEDICINE
Payer: COMMERCIAL

## 2020-09-22 PROCEDURE — 93306 TTE W/DOPPLER COMPLETE: CPT | Performed by: INTERNAL MEDICINE

## 2020-09-22 PROCEDURE — 71046 X-RAY EXAM CHEST 2 VIEWS: CPT | Performed by: INTERNAL MEDICINE

## 2020-09-22 PROCEDURE — 99232 SBSQ HOSP IP/OBS MODERATE 35: CPT | Performed by: HOSPITALIST

## 2020-09-22 PROCEDURE — 99232 SBSQ HOSP IP/OBS MODERATE 35: CPT | Performed by: INTERNAL MEDICINE

## 2020-09-22 NOTE — PROGRESS NOTES
[facility]  Progress Note     Will Barrera Patient Status:  Inpatient    1970 MRN [id number]   [facility] 2NE-A Attending Bartolo Purdy MD   Hosp Day # 2 PCP Karoline Benitez MD     Chief Complaint: CHF    S: Patient denies c Labs   Lab 09/21/20  0938   TROP 0.272*            Imaging: Imaging data reviewed in Epic.     Medications:   • cefTRIAXone  2 g Intravenous Q24H   • insulin detemir  20 Units Subcutaneous [email or web address]   • enoxaparin  0.5 mg/kg Subcutaneous Daily   • iron

## 2020-09-22 NOTE — PLAN OF CARE
OOB in chair, now on room air  Denies feeling SOB  Denies chest pain or palpitations. VSS. Orthostatic blood pressure reviewed. Hydralazine, Isordil and Furosemide on hold at this time.

## 2020-09-22 NOTE — PROGRESS NOTES
[facility]  Advanced Heart Failure Progress Note    Yaima Chacon Patient Status:  Inpatient    1970 MRN [id number]   [facility] 2NE-A Attending Rica Jackman MD   Hosp Day # 1 PCP Melania Anne MD     Subjective:   The patient normal affect. Skin: Warm and dry.        Labs:  Lab Results   Component Value Date    WBC 6.7 09/21/2020    RBC 5.51 09/21/2020    HGB 14.5 09/21/2020    HCT 42.8 09/21/2020    MCV 77.7 09/21/2020    MCH 26.3 09/21/2020    MCHC 33.9 09/21/2020    RDW 14.4 UNIT/ML flexpen 1-10 Units, 1-10 Units, Subcutaneous, TID AC and HS    •  Insulin Aspart Pen (NOVOLOG) 100 UNIT/ML flexpen 1-68 Units, 1-68 Units, Subcutaneous, TID CC    •  insulin detemir (LEVEMIR) 100 UNIT/ML flextouch 15 Units, 15 Units, Subcutaneous, MERA PORTILLO. Medical Director, Heart Failure Research, [address] Director, [facility], [place] AT [address] 4th [address]  Sujey Corcoran 12, [address] [facility], [address] [address]  [address]

## 2020-09-22 NOTE — PLAN OF CARE
Assumed care of pt @2330. Pt axox4. Denies chest pain. Sinus rhythm with 1st degree AVB on tele. Lungs clear, pt is soboe. Pt is EVA, unable to wear CPAP at this time until COVID results back. Pt is on 4lnc with sleep. Abdomen soft, bs +4.  Pt does c/o feel

## 2020-09-22 NOTE — PAYOR COMM NOTE
--------------  ADMISSION REVIEW     Payor: KORI BUCKNER  Subscriber #:  [id number]  Authorization Number: [id number]    Admit date: 9/21/20  Admit time: 4095     Patient Seen in: [facility] Emergency Department    History   Stated Complaint: DM    Leslye intact. Pupils are equal and reactive to light. Oropharynx is pink and moist.  NECK: Neck is supple and nontender. The trachea is midline. LUNGS: Lungs are diminished, bilaterally, respirations are unlabored. HEART: Regular rate and rhythm.  There are n from previous EKG. Xr Chest Ap Portable:  Mild prominence of the pulmonary vasculature may reflect mild vascular congestion and volume overload.   If there is persistent clinical concern then consider CT.      MDM    Given the patient's tendency to become reports that he does not use drugs.   Family History:   Family History   Problem Relation Age of Onset   • Heart Disorder Mother    • Diabetes Mother    • Heart Disorder Maternal Grandmother    • Hypertension Maternal Grandmother    • Diabetes Maternal [facility] [place] tablet (20 mg total) by mouth 3 (three) times daily. , Disp: 90 tablet, Rfl: 5  •  [DISCONTINUED] ASPIRIN LOW DOSE 81 MG Oral Tab EC, TAKE 1 TABLET BY MOUTH DAILY, Disp: 90 tablet, Rfl: 0  •  [DISCONTINUED] Sacubitril-Valsartan (ENTRESTO) 49-51 MG Oral Tab, with EVA  7. NSVT status post AICD  8.  Diabetes II- hyperglycemic protocol    CARDS:    HISTORY OF PRESENT ILLNESS:  This gentleman is noted to have known severe nonischemic cardiomyopathy, EF in the 15% to 20% range, with normal coronaries as noted on ang lab testing: BUN 26, creatinine 1.45, potassium slightly low at 3.3. White count 7.7 with a hemoglobin of 15. INR 1.1.   Troponin borderline as noted above at 0.27.     EKG nonspecific but sinus rhythm.     IMPRESSION:  A 53year-old gentleman, known clem Upper Arm) Ethan Wellington RN    9/21/2020 2115 Given 2 Units Subcutaneous (Left Upper Abdomen) Gene Marques RN    9/21/2020 1646 Given 3 Units Subcutaneous (Left Upper Arm) Sai Hernandez RN      Insulin Aspart Pen (NOVOLOG) 100 UNIT/ML flexpen 1-68 mg Oral Sean Ortiz RN    9/21/2020 2116 Given 50 mg Oral Digal, Vinnie Smith RN

## 2020-09-22 NOTE — PAYOR COMM NOTE
--------------  9/22 CONTINUED STAY REVIEW    Payor: [facility]  Subscriber #:  [id number]  Authorization Number: [id number]    HOSPITALIST:  S: Felt a little weak with blood pressure low this morning. Feels better now.   He has not exerted himself so he i (Oral)   Resp 20   Ht 2' 0.02\" (0.61 m)   Wt (!) 349 lb 3.3 oz (158.4 kg)   SpO2 95%   .68       Intake/Output:     Intake/Output Summary (Last 24 hours) at 9/22/2020 1243  Last data filed at 9/22/2020 1157      Gross per 24 hour   Intake 540 ml 9/21/2020 1641 Given 25 mg Oral Talat Meng RN      digoxin (LANOXIN) tab 250 mcg     Date Action Dose Route User    9/22/2020 4261 Given 250 mcg Oral Efren Wong RN      furosemide (LASIX) injection 40 mg     Date Action Dose Route User    9/ Rosuvastatin Calcium (CRESTOR) tab 20 mg     Date Action Dose Route User    9/21/2020 2116 Given 20 mg Oral Vinnie Kenny RN      Sacubitril-Valsartan (ENTRESTO)  MG per tab 1 tablet     Date Action Dose Route User    9/22/2020 2186 Given 1 table

## 2020-09-22 NOTE — DIETARY NOTE
[address] Jacqu[address] Said Sima     Admitting diagnosis:  Elevated troponin [R79.89]  Acute on chronic congestive heart failure, unspecified heart failure type ([facility] [address] [I50.9]    Ht: 6'0  Wt: (!) 158.4 kg (349 lb 3.3 oz).  This is 197% of

## 2020-09-22 NOTE — PROGRESS NOTES
[facility]ITAL[facility]  Progress Note     Ella Soto Patient Status:  Inpatient    1970 MRN [id number]   [facility], [place] 2NE-A Attending Carlos Wynn MD   Hosp Day # 1 PCP Haleigh Guy MD     Chief Complaint: CHF   S: Felt a little w reviewed in Epic.   Medications:   • aspirin  81 mg Oral Daily   • carvedilol  25 mg Oral BID with meals   • digoxin  250 mcg Oral Daily   • hydrALAZINE HCl  100 mg Oral TID   • isosorbide dinitrate  20 mg Oral TID   • Rosuvastatin Calcium  20 mg Oral Night

## 2020-09-23 PROCEDURE — 99232 SBSQ HOSP IP/OBS MODERATE 35: CPT | Performed by: CLINICAL NURSE SPECIALIST

## 2020-09-23 PROCEDURE — 99233 SBSQ HOSP IP/OBS HIGH 50: CPT | Performed by: HOSPITALIST

## 2020-09-23 RX ORDER — CYCLOBENZAPRINE HCL 5 MG
5 TABLET ORAL NIGHTLY PRN
Status: DISCONTINUED | OUTPATIENT
Start: 2020-09-23 | End: 2020-09-25

## 2020-09-23 RX ORDER — ENOXAPARIN SODIUM 100 MG/ML
0.5 INJECTION SUBCUTANEOUS DAILY
Status: DISCONTINUED | OUTPATIENT
Start: 2020-09-23 | End: 2020-09-25

## 2020-09-23 RX ORDER — POTASSIUM CHLORIDE 20 MEQ/1
40 TABLET, EXTENDED RELEASE ORAL ONCE
Status: COMPLETED | OUTPATIENT
Start: 2020-09-23 | End: 2020-09-23

## 2020-09-23 NOTE — PAYOR COMM NOTE
--------------  CONTINUED STAY REVIEW    Payor: [facility]  Subscriber #:  [id number]  Authorization Number: Griselda Lehman    Admit date: 9/21/20  Admit [facility]: [facility]    Admitting Physician: Josue Still MD  Attending Physician:  Shirley Ibrahim MD  Primary C lasix until tomorrow, then likely change to po   · No hydralazine/isordil for now, otherwise on baseline HF regimen  · Antibiotics per PCP        EULALIA Hardy  9/23/2020          MEDICATIONS ADMINISTERED IN LAST 1 DAY:  aspirin chewable tab 81 mg 9/22/2020 1832 Given 4 Units Subcutaneous (Left Lower Abdomen) William FISHER RN      insulin detemir (LEVEMIR) 100 UNIT/ML flextouch 15 Units     Date Action Dose Route User    9/22/2020 2031 Given 15 Units Subcutaneous (Left Upper Abdomen) Ondina Bennett

## 2020-09-23 NOTE — PLAN OF CARE
Assumed care of pt @2330. Pt axox4. Denies chest pain or sob. Denies dizziness at this time. Sinus rhythm with 1st degree AVB on tele. Lungs clear/diminished at bases. O2 at [address] with sleep. O2 sats >90%.  Pt unable to tolerate CPAP at this time wants to st

## 2020-09-23 NOTE — PROGRESS NOTES
[facility] Pharmacy Note: Antimicrobial Weight Based Dose Adjustment for: ceftriaxone (Loral Adjutant)    Gamaliel Cheema is a 53year old patient who has been prescribed ceftriaxone (ROCEPHIN) 1000 mg every 24 hours.     Estimated Creatinine Clearance: 161 mL/min (based on

## 2020-09-23 NOTE — PROGRESS NOTES
[facility] Pharmacy Note:  Anticoagulation Weight Dose Adjustment for enoxaparin (LOVENOX)    Lori Anne is a 53year old patient who has been prescribed enoxaparin (LOVENOX) 40 mg every 24 hours.       Estimated Creatinine Clearance: 80.8 mL/min (based on SCr of

## 2020-09-23 NOTE — PROGRESS NOTES
[facility] Cardiology Progress Note    Subjective:  Slept poorly last night with cpap, this occurs at home occasionally as well. Overall he continues to have increased urine output and he feels his leg/abdominal swelling and breathing have improved.      Stephen Day

## 2020-09-23 NOTE — PLAN OF CARE
9/23/2020    A&Ox4  RA during day and CPAP at night but having issues with CPAP pressures so pt did not slep well last night. Pt would like to be on 3L NC at night instead of CPAP.   Continent of B and B  Up ad sukhjinder  Denies any pain or sob  Pt sitting in neymar

## 2020-09-23 NOTE — PLAN OF CARE
Received patient this morning aaox4  SR on tele  Diminished lung sounds  Non pitting edema bilateral lung sounds  Patient states he has a hard night sleeping as he was unable to \"get into a rhythm\" with his c-pap machine.  Dozing on and off and requesting

## 2020-09-23 NOTE — DIETARY NOTE
[address] Jacqu[address] Said Sima     Admitting diagnosis:  Elevated troponin [R79.89]  Acute on chronic congestive heart failure, unspecified heart failure type ([facility] [address] [I50.9]    Ht: 6'0  Wt: (!) 159.2 kg (350 lb 15.6 oz).  This is 197% of

## 2020-09-23 NOTE — RESPIRATORY THERAPY NOTE
EVA - Equipment Use Daily Summary:  · Set Mode   · Usage in hours:   · 90% Pressure (EPAP) level:   · 90% Insp Pressure (IPAP):   · AHI:   · Supplemental Oxygen:  · Comments: DID NOT WEAR SAID PRESSURE WAS TOO HIGH.   CHANGED SETTINGS TO AUTO 5-12 WITH A RA

## 2020-09-24 PROCEDURE — 99232 SBSQ HOSP IP/OBS MODERATE 35: CPT | Performed by: INTERNAL MEDICINE

## 2020-09-24 PROCEDURE — 99233 SBSQ HOSP IP/OBS HIGH 50: CPT | Performed by: HOSPITALIST

## 2020-09-24 RX ORDER — ISOSORBIDE DINITRATE 10 MG/1
10 TABLET ORAL
Status: DISCONTINUED | OUTPATIENT
Start: 2020-09-24 | End: 2020-09-25

## 2020-09-24 RX ORDER — HYDRALAZINE HYDROCHLORIDE 50 MG/1
50 TABLET, FILM COATED ORAL EVERY 8 HOURS SCHEDULED
Status: DISCONTINUED | OUTPATIENT
Start: 2020-09-24 | End: 2020-09-25

## 2020-09-24 NOTE — PLAN OF CARE
9/24/2020    A&Ox4  RA during the day  3L NC when dozing off or sleeping  Pt is EVA and wears CPAP regularly but cannot get comfortable in  hospital. Pt wants to have another sleep study done.     Denies any pain or sob  NSR on tele    EF= 10-15%    Accu Achieves optimal ventilation and oxygenation  Description: INTERVENTIONS:  - Assess for changes in respiratory status  - Instruct to report SOB or any respiratory difficulty  - Administer nebulizers and inhalers as ordered  - Assess for changes in [facility]

## 2020-09-24 NOTE — PLAN OF CARE
Problem: CARDIOVASCULAR - ADULT  Goal: Maintains optimal cardiac output and hemodynamic stability  Description: INTERVENTIONS:  - Monitor vital signs, rhythm, and trends  - Monitor for bleeding, hypotension and signs of decreased cardiac output  - Evalua supplementation based on oxygen saturation  - Provide Smoking Cessation handout, if applicable  - Encourage broncho-pulmonary hygiene including cough, deep breathe, incentive spirometry  Outcome: Progressing

## 2020-09-24 NOTE — RESPIRATORY THERAPY NOTE
EVA - Equipment Use Daily Summary:  · Set Mode   · Usage in hours:   · 90% Pressure (EPAP) level:   · 90% Insp Pressure (IPAP):   · AHI:   · Supplemental Oxygen:  · Comments: DID NOT USE

## 2020-09-24 NOTE — PAYOR COMM NOTE
--------------   CONTINUED STAY REVIEW    Payor: [facility]  Subscriber #:  [id number]  Authorization Number: [id number]    Advanced Heart Failure Progress Note           Julieta Stephens Patient Status:  Inpatient    1970 MRN [id number]   Location E Therefore it is imperative that we resume hydralazine and nitrates as he was taking them at home.   Perhaps we could have respiratory therapy inquired about the patient's difficulty with the CPAP mask and determine if there are any adjustments that need to 9/24/2020 0700 Given 1 Units Subcutaneous (Right Upper Arm) Raj Ricardo RN    9/23/2020 2147 Given 5 Units Subcutaneous (Left Lower Abdomen) Noemy Kenny, DANIEL      Insulin Aspart Pen (NOVOLOG) 100 UNIT/ML flexpen 1-68 Units     Date Action Dose Route Us

## 2020-09-24 NOTE — PROGRESS NOTES
[facility]  Advanced Heart Failure Progress Note    Kathleen Browne Patient Status:  Inpatient    1970 MRN [id number]   [facility] 2NE-A Attending Walker Reesndiz MD   Hosp Day # 3 PCP Yogesh Llanes MD     Subjective:  Feels sully 07/29/2018    INR 1.02 04/25/2018     No results for input(s): BNPML in the last 168 hours.   BUN (mg/dL)   Date Value   09/24/2020 18   09/23/2020 20 (H)   09/22/2020 23 (H)     Creatinine (mg/dL)   Date Value   09/24/2020 0.92   09/23/2020 1.02   09/22/20 Units, Subcutaneous, TID CC    •  furosemide (LASIX) injection 40 mg, 40 mg, Intravenous, BID (Diuretic)        Assessment and Plan:  Patient Active Problem List:     Elevated troponin     Chronic systolic CHF (congestive heart failure) (HCC)     Type 2 di [address]  [place], [address] [address] [place]  Phone: [phone]  Fax: [phone]  E-mail: [email or web address]    9/24/2020  9:55 AM

## 2020-09-24 NOTE — PROGRESS NOTES
[facility]ITAL[facility]  Progress Note     Will Barrera Patient Status:  Inpatient    1970 MRN [id number]   [facility] 2NE-A Attending Bartolo Purdy MD   Hosp Day # 3 PCP Karoline Benitez MD     Chief Complaint: CHF    S: Patient seen dur 109.8 mL/min (based on SCr of 0.92 mg/dL). Recent Labs   Lab 09/21/20 0938   PTP 14.7*   INR 1.12*       Recent Labs   Lab 09/21/20 0938   TROP 0.272*            Imaging: Imaging data reviewed in Epic.     Medications:   • hydrALAzine HCl  50 mg Oral Q Home    Plan of care discussed with patient and RN.      Ermias Jackson MD

## 2020-09-25 VITALS
DIASTOLIC BLOOD PRESSURE: 63 MMHG | OXYGEN SATURATION: 92 % | BODY MASS INDEX: 41.75 KG/M2 | RESPIRATION RATE: 18 BRPM | HEART RATE: 73 BPM | HEIGHT: 73 IN | SYSTOLIC BLOOD PRESSURE: 97 MMHG | WEIGHT: 315 LBS | TEMPERATURE: 98 F

## 2020-09-25 PROCEDURE — 99232 SBSQ HOSP IP/OBS MODERATE 35: CPT | Performed by: INTERNAL MEDICINE

## 2020-09-25 PROCEDURE — 99239 HOSP IP/OBS DSCHRG MGMT >30: CPT | Performed by: HOSPITALIST

## 2020-09-25 RX ORDER — HYDRALAZINE HYDROCHLORIDE 100 MG/1
50 TABLET, FILM COATED ORAL EVERY 8 HOURS SCHEDULED
Status: SHIPPED | COMMUNITY
Start: 2020-09-25 | End: 2021-12-23

## 2020-09-25 RX ORDER — INSULIN ASPART 100 [IU]/ML
10 INJECTION, SOLUTION INTRAVENOUS; SUBCUTANEOUS
Qty: 5 PEN | Refills: 3 | Status: SHIPPED | OUTPATIENT
Start: 2020-09-25 | End: 2020-09-25

## 2020-09-25 RX ORDER — ISOSORBIDE DINITRATE 20 MG/1
10 TABLET ORAL
Status: SHIPPED | COMMUNITY
Start: 2020-09-25 | End: 2021-12-23

## 2020-09-25 RX ORDER — POTASSIUM CHLORIDE 20 MEQ/1
40 TABLET, EXTENDED RELEASE ORAL ONCE
Status: COMPLETED | OUTPATIENT
Start: 2020-09-25 | End: 2020-09-25

## 2020-09-25 RX ORDER — INSULIN DETEMIR 100 [IU]/ML
20 INJECTION, SOLUTION SUBCUTANEOUS EVERY 12 HOURS
Qty: 5 PEN | Refills: 3 | Status: SHIPPED | OUTPATIENT
Start: 2020-09-25 | End: 2020-09-25

## 2020-09-25 RX ORDER — CEFDINIR 300 MG/1
300 CAPSULE ORAL 2 TIMES DAILY
Qty: 8 CAPSULE | Refills: 0 | Status: SHIPPED | OUTPATIENT
Start: 2020-09-26 | End: 2020-09-30

## 2020-09-25 RX ORDER — INSULIN ASPART 100 [IU]/ML
10 INJECTION, SOLUTION INTRAVENOUS; SUBCUTANEOUS
Qty: 5 PEN | Refills: 3 | Status: SHIPPED | OUTPATIENT
Start: 2020-09-25 | End: 2020-09-29 | Stop reason: CLARIF

## 2020-09-25 RX ORDER — INSULIN DETEMIR 100 [IU]/ML
20 INJECTION, SOLUTION SUBCUTANEOUS EVERY 12 HOURS
Qty: 5 PEN | Refills: 3 | Status: SHIPPED | OUTPATIENT
Start: 2020-09-25

## 2020-09-25 RX ORDER — CEFDINIR 300 MG/1
300 CAPSULE ORAL 2 TIMES DAILY
Qty: 8 CAPSULE | Refills: 0 | Status: SHIPPED | OUTPATIENT
Start: 2020-09-26 | End: 2020-09-25

## 2020-09-25 NOTE — DIETARY NOTE
Clinical Nutrition - DM education     Dietitian consult received for diabetes diet education. Appropriate education and handout provided.  Discussed carbohydrate counting, role of carbohydrates in blood sugars, carbohydrate containing foods, appropriate ca

## 2020-09-25 NOTE — PLAN OF CARE
Patient is alert and oriented, denies pain. Up to chair this morning with independently. He has lost 3lbs since admission. He is being treated for UTI infection with antibiotics.   His diarrhea he contributes to taking metformin, since being off metformin breathe, incentive spirometry  Outcome: Progressing

## 2020-09-25 NOTE — PROGRESS NOTES
[facility] Cardiology Progress Note    Subjective:  Doing well, feels ready to go home. He explains her has diarrhea when on metformin, and the plan now is to stop and be on insulin.     Objective:  /84   Pulse 76   Temp 98.2 °F (36.8 °C) (Oral)   Resp 1 + agree with plan.

## 2020-09-25 NOTE — PLAN OF CARE
9/24/2020    Pt did not get his 1800 dose of Isordil and hydralazine. Pt's systolic was in the 87'Y and asymptomatic.   Pt felt dizzy and weak  Gave report to [address] [place] [address],B-1 RN to notify Cards in am  Pt sitting in the chair  Sys in 90's

## 2020-09-25 NOTE — RESPIRATORY THERAPY NOTE
Patient refusing CPAP tonight. Mask does not seal well on his face. Other masks were offered but refused.

## 2020-09-25 NOTE — PROGRESS NOTES
[facility]  Progress Note     Zahra Aguilar Patient Status:  Inpatient    1970 MRN [id number]   [facility] 2NE-A Attending Patti Gant MD   Hosp Day # 4 PCP Aidan Navarro MD     Chief Complaint: CHF    S: Patient denies n TP 8.0 6.9  --  7.4  --   --     < > = values in this interval not displayed. Estimated Creatinine Clearance: 108.6 mL/min (based on SCr of 0.93 mg/dL).     Recent Labs   Lab 09/21/20  0938   PTP 14.7*   INR 1.12*       Recent Labs   Lab 09/21/20 resolved  12. Microcytosis sp Venofer x 1    Quality:  · DVT Prophylaxis: Lovenox  · CODE status: Full  · Gooden: No  · Central line: No    Will the patient be referred to [facility] on discharge?: Yes  Estimated date of discharge:  Today  Discharge is dependent on

## 2020-09-25 NOTE — PROGRESS NOTES
[facility]  [address] [facility], [address]  ?  09/25/20  ? Re: Holliebing Navarro  ? To Whom It May Concern:    Hollie Navarro was admitted to [facility] from 9/21/2020 to 09/25/20.     Please excuse Hollie Navarro from attending work for thes

## 2020-09-25 NOTE — PROGRESS NOTES
Alert. Oriented. sr per tele. desats to 83-85% on room air while awake. o2 sat on 2lnc 95%. Denies pain. Ambulatory. sbp 90-100s. Denies dizziness or lightheadedness. poc updated w/ pt. Cont to monitor pt.

## 2020-09-25 NOTE — PAYOR COMM NOTE
--------------  9/25 CONTINUED STAY REVIEW    Payor: [facility]  Subscriber #:  [id number]  Authorization Number: [id number]    Anticipate DC today. NURSING:  Alert. Oriented. sr per tele. desats to 83-85% on room air while awake.  o2 sat on 2lnc 95 with MITCHELL Negro on 10/16        EULALIA Garsia  9/25/2020  10:50 AM  The patient will go home on the preadmission doses of medications. I plan to see him within 2 to 3 weeks.   Into in the interim I would like 1 of our APN to see the patient after dis Subcutaneous (Right Upper Arm) Elba Tafoya, RN      Insulin Aspart Pen (NOVOLOG) 100 UNIT/ML flexpen 10 Units     Date Action Dose Route User    9/25/2020 0828 Given 10 Units Subcutaneous (Left Lower Abdomen) Italo Marie, RN      insulin detemir (LEVEMIR

## 2020-09-25 NOTE — RESPIRATORY THERAPY NOTE
EVA - Equipment Use Daily Summary:  · Set Mode AFLEX  · Usage in hours: 2:40  · 90% Pressure (EPAP) level: 12.0  · 90% Insp Pressure (IPAP):   · AHI: 48.4  · Supplemental Oxygen:  · Comments:

## 2020-09-26 NOTE — PLAN OF CARE
Patient is alert and oriented. Denies pain. Ambulating in hallway on room air today at 92% oxygen saturation, tolerated well. Discharging home today, all physicians in agreement. AVS reviewed with teach back method.  [facility]

## 2020-09-28 ENCOUNTER — PATIENT OUTREACH (OUTPATIENT)
Dept: CASE MANAGEMENT | Age: 50
End: 2020-09-28

## 2020-09-28 DIAGNOSIS — Z02.9 ENCOUNTERS FOR UNSPECIFIED ADMINISTRATIVE PURPOSE: ICD-10-CM

## 2020-09-28 NOTE — PAYOR COMM NOTE
--------------  DISCHARGE REVIEW    Payor: KORI BUCKNER  Subscriber #:  [id number]  Authorization Number: [id number]    Admit date: 9/21/20  Admit time:  1011  Discharge Date: 9/25/2020  4:43 PM     Admitting Physician: Rica Jackman MD  Attending Bianca Claudio discharge  10. Hyponatremia, resolved  11. Acute kidney injury, resolved  12. Microcytosis sp Venofer x 1    History of Present Illness: Mao Acevedo is a 53year old male presents with worsening shortness of breath.   Patient states been progressing for day NovoLOG FlexPen 100 UNIT/ML Sopn  Generic drug: Insulin Aspart Pen      Inject 10 Units into the skin 3 (three) times daily before meals.    Quantity: 5 pen  Refills: 3        CHANGE how you take these medications      Instructions Prescription details Tabs  Commonly known as: ALDACTONE      Take 50 mg by mouth 2 (two) times daily.    Refills: 0     torsemide 20 MG Tabs  Commonly known as: DEMADEX      Take 2 tabs in am and 1 tab in PM   Quantity: 180 tablet  Refills: 0        STOP taking these medication apps (could be competing for audio resources)  2. Disable Bluetooth  3.       Reboot mobile device before joining the video  4. Come off Wi-Fi and switch over to Data    Please see our Video Visit Tip Sheet if you need additional assistance.

## 2020-09-28 NOTE — PROGRESS NOTES
Spoke with pt and she says that she should get insurance in about a month through her job. Once she gets it, she knows to schedule appt for f/u. No other questions or concerns at this time. Video Visit  [address]      Please note that the following visit was completed using two-way, real-time interactive audio and/or video communication.   This has been done in good alannah to provide continuity of care in the be before breakfast 195, before lunch 256, before dinner 253. He reports eating a lot of fruit. He is uncertain what the proper diabetic diet is.     Interactive contact within 2 business days post discharge first initiated on Date: 9/28/2020      Allergies: tablet, Rfl: 0    •  spironolactone 25 MG Oral Tab, Take 50 mg by mouth 2 (two) times daily. , Disp: , Rfl:     •  aspirin 81 MG Oral Chew Tab, Chew 1 tablet (81 mg total) by mouth daily. , Disp: 30 tablet, Rfl: 0    No current facility-administered medica reflect mild vascular congestion and volume overload. If there is persistent clinical concern then consider CT.     Dictated by (CST): Miguel A Carter MD on 9/21/2020 at 10:09 AM     Finalized by (CST): Miguel A Carter MD on 9/21/2020 at 10:10 AM         Lab Results (HCC)/Non-ischemic cardiomyopathy (HCC)/ICD (implantable cardioverter-defibrillator) in place/Essential hypertension/Hypercholesteremia  · Repeat BMP scheduled on 10/13/2020 at PCP office during PCP visit  · HF guidelines reviewed  · Follow up with cards A 10/01/2020  Diabetes Care A1C due on 12/21/2020  Annual Depression Screen due on 09/21/2021  Pneumococcal Vaccine: Birth to 64yrs Completed    Transitional Care Management Certification:  During the visit, the following was completed:  ?  Obtained and revie scheduled appointment time to ensure your camera and microphone are working properly. Once the video visit has started you will be placed in a waiting room until the provider begins the visit. You will receive an email confirmation with instructions. prior to their appointment time. INSURANCE QUESTIONS  Please bring your insurance card, 's license/ID, the order for  education from your doctor, any pertinent lab results and a list of  medications patient is taking.  Please call your insurance co

## 2020-09-29 ENCOUNTER — TELEMEDICINE (OUTPATIENT)
Dept: INTERNAL MEDICINE CLINIC | Facility: CLINIC | Age: 50
End: 2020-09-29

## 2020-09-29 DIAGNOSIS — I50.9 ACUTE ON CHRONIC CONGESTIVE HEART FAILURE, UNSPECIFIED HEART FAILURE TYPE (HCC): Primary | ICD-10-CM

## 2020-09-29 DIAGNOSIS — E11.65 UNCONTROLLED TYPE 2 DIABETES MELLITUS WITH HYPERGLYCEMIA (HCC): ICD-10-CM

## 2020-09-29 DIAGNOSIS — G47.33 OSA ON CPAP: ICD-10-CM

## 2020-09-29 DIAGNOSIS — Z95.810 ICD (IMPLANTABLE CARDIOVERTER-DEFIBRILLATOR) IN PLACE: ICD-10-CM

## 2020-09-29 DIAGNOSIS — I42.8 NON-ISCHEMIC CARDIOMYOPATHY (HCC): ICD-10-CM

## 2020-09-29 DIAGNOSIS — E78.00 HYPERCHOLESTEREMIA: ICD-10-CM

## 2020-09-29 DIAGNOSIS — Z99.89 OSA ON CPAP: ICD-10-CM

## 2020-09-29 DIAGNOSIS — N39.0 URINARY TRACT INFECTION WITHOUT HEMATURIA, SITE UNSPECIFIED: ICD-10-CM

## 2020-09-29 DIAGNOSIS — I10 ESSENTIAL HYPERTENSION: ICD-10-CM

## 2020-09-29 PROCEDURE — 99495 TRANSJ CARE MGMT MOD F2F 14D: CPT | Performed by: CLINICAL NURSE SPECIALIST

## 2020-09-29 NOTE — PROGRESS NOTES
TRANSITIONAL CARE CLINIC PHARMACIST MEDICATION RECONCILIATION        Yaima Chacon MRN [id number]    1970 PCP Melania Anne MD       Comments: Medication history completed by the [facility] [place] Pharmacist with the patient via video. 100 MG Oral Cap Take 200 mg by mouth 3 (three) times daily as needed. • MELATONIN 5 MG Oral Tab Take 5 mg by mouth nightly as needed.    • CARVEDILOL 25 MG Oral Tab TAKE 1 TABLET BY MOUTH TWICE DAILY WITH MEALS   • ROSUVASTATIN CALCIUM 20 MG Oral Tab TA patient to check on his levels. Novolog:  Inject 10 units three times a day before meals plus sliding scale:   If -200 give 1 unit           201-250 give 2 units           251-300 give 3 units           301-350 give 4 units           > 351 call p

## 2020-10-02 ENCOUNTER — TELEPHONE (OUTPATIENT)
Dept: FAMILY MEDICINE CLINIC | Facility: CLINIC | Age: 50
End: 2020-10-02

## 2020-10-02 NOTE — TELEPHONE ENCOUNTER
Patient dropped off la paperwork. Original to triage and copy placed in fax room. Patient filled out ILYA to send back to [facility].

## 2020-10-08 ENCOUNTER — NURSE ONLY (OUTPATIENT)
Dept: ENDOCRINOLOGY CLINIC | Facility: CLINIC | Age: 50
End: 2020-10-08
Payer: COMMERCIAL

## 2020-10-08 ENCOUNTER — OFFICE VISIT (OUTPATIENT)
Dept: ENDOCRINOLOGY CLINIC | Facility: CLINIC | Age: 50
End: 2020-10-08
Payer: COMMERCIAL

## 2020-10-08 VITALS
WEIGHT: 315 LBS | HEART RATE: 80 BPM | TEMPERATURE: 97 F | HEIGHT: 73 IN | OXYGEN SATURATION: 96 % | DIASTOLIC BLOOD PRESSURE: 70 MMHG | SYSTOLIC BLOOD PRESSURE: 110 MMHG | BODY MASS INDEX: 41.75 KG/M2

## 2020-10-08 DIAGNOSIS — E11.65 UNCONTROLLED TYPE 2 DIABETES MELLITUS WITH HYPERGLYCEMIA (HCC): Primary | ICD-10-CM

## 2020-10-08 PROBLEM — D17.22 LIPOMA OF LEFT UPPER EXTREMITY: Status: RESOLVED | Noted: 2018-05-07 | Resolved: 2020-10-08

## 2020-10-08 PROBLEM — N39.0 URINARY TRACT INFECTION WITHOUT HEMATURIA, SITE UNSPECIFIED: Status: RESOLVED | Noted: 2018-07-29 | Resolved: 2020-10-08

## 2020-10-08 PROBLEM — J18.9 COMMUNITY ACQUIRED PNEUMONIA: Status: RESOLVED | Noted: 2020-01-23 | Resolved: 2020-10-08

## 2020-10-08 PROBLEM — J18.9 COMMUNITY ACQUIRED PNEUMONIA, UNSPECIFIED LATERALITY: Status: RESOLVED | Noted: 2020-01-23 | Resolved: 2020-10-08

## 2020-10-08 PROBLEM — R50.9 ACUTE FEBRILE ILLNESS: Status: RESOLVED | Noted: 2018-07-29 | Resolved: 2020-10-08

## 2020-10-08 PROBLEM — R73.9 HYPERGLYCEMIA: Status: RESOLVED | Noted: 2020-09-21 | Resolved: 2020-10-08

## 2020-10-08 PROBLEM — E87.6 HYPOKALEMIA: Status: RESOLVED | Noted: 2020-09-21 | Resolved: 2020-10-08

## 2020-10-08 PROBLEM — I50.9 ACUTE ON CHRONIC CONGESTIVE HEART FAILURE (HCC): Status: RESOLVED | Noted: 2018-06-13 | Resolved: 2020-10-08

## 2020-10-08 PROBLEM — I10 BENIGN ESSENTIAL HTN: Chronic | Status: RESOLVED | Noted: 2018-04-25 | Resolved: 2020-10-08

## 2020-10-08 PROBLEM — J11.1 INFLUENZA: Status: RESOLVED | Noted: 2020-01-23 | Resolved: 2020-10-08

## 2020-10-08 PROBLEM — R06.00 DYSPNEA: Status: RESOLVED | Noted: 2019-03-08 | Resolved: 2020-10-08

## 2020-10-08 PROBLEM — I50.9 ACUTE ON CHRONIC CONGESTIVE HEART FAILURE, UNSPECIFIED HEART FAILURE TYPE (HCC): Status: RESOLVED | Noted: 2018-06-13 | Resolved: 2020-10-08

## 2020-10-08 PROBLEM — N17.9 ACUTE KIDNEY INJURY (HCC): Status: RESOLVED | Noted: 2020-09-21 | Resolved: 2020-10-08

## 2020-10-08 PROBLEM — E87.1 HYPONATREMIA: Status: RESOLVED | Noted: 2020-09-21 | Resolved: 2020-10-08

## 2020-10-08 PROBLEM — R09.02 HYPOXIA: Status: RESOLVED | Noted: 2020-01-23 | Resolved: 2020-10-08

## 2020-10-08 PROBLEM — S30.1XXA HEMATOMA OF GROIN: Status: RESOLVED | Noted: 2018-06-30 | Resolved: 2020-10-08

## 2020-10-08 PROCEDURE — 1111F DSCHRG MED/CURRENT MED MERGE: CPT | Performed by: NURSE PRACTITIONER

## 2020-10-08 PROCEDURE — 3074F SYST BP LT 130 MM HG: CPT | Performed by: NURSE PRACTITIONER

## 2020-10-08 PROCEDURE — 3078F DIAST BP <80 MM HG: CPT | Performed by: NURSE PRACTITIONER

## 2020-10-08 PROCEDURE — 3008F BODY MASS INDEX DOCD: CPT | Performed by: NURSE PRACTITIONER

## 2020-10-08 PROCEDURE — 99215 OFFICE O/P EST HI 40 MIN: CPT | Performed by: NURSE PRACTITIONER

## 2020-10-08 RX ORDER — SEMAGLUTIDE 1.34 MG/ML
0.25 INJECTION, SOLUTION SUBCUTANEOUS WEEKLY
Qty: 3 ML | Refills: 0 | COMMUNITY
Start: 2020-10-08

## 2020-10-08 RX ORDER — INSULIN ASPART 100 [IU]/ML
INJECTION, SOLUTION INTRAVENOUS; SUBCUTANEOUS
Qty: 15 ML | Refills: 0 | OUTPATIENT
Start: 2020-10-08

## 2020-10-08 NOTE — PATIENT INSTRUCTIONS
We are here to support you with Diabetes but please remember that you still need your primary care doctor for your routine health maintenance.    Your A1C: 11.1%   This is too high for you and we will work together on lowering your blood sugars to help impr side effects:   Nausea or diarrhea   These effects usually go away over time as your body gets used to the medicine     Here are some things that might help your nausea go away:     Eat small amounts of food instrad of few large meals  Eat plain, bland non sensor will have to be removed prior to the test. Feel free to remove the sensor but still keep the device for us to download the stored blood sugar readings.  Again, you can call [facility] to get a replacement if it needs to be taken off before the 14 day expi minutes. If blood glucose is still low (less than 70 mg/dl) repeat the treatment (step 2). 4. If your next meal is more than one hour away, eat a small snack. 5. If you’re not sure what caused your low blood glucose, call your healthcare provider.   6. Al preferred pharmacy does not have the requested medication in stock (e.g. Backordered), it is your responsibility to find another pharmacy that has the requested medication available.   We will gladly send a new prescription to that pharmacy at your request.

## 2020-10-08 NOTE — PROGRESS NOTES
Julieta Stephens is a 53year old male who presents today to establish for type 2 diabetes management.    Primary care physician: Morgan Rees MD  In the past 3m DM control has not been well controlled as evidenced by most recent A1C: 11%)   However in past Stroke/CVA: no    Modifying factors:  Medication adherence: yes   Nutrition  Recently gave up  Fruit juices, soda   Exercise: no   Recent steroids, illness or infections: yes recent UTI       Allergies: Sulfa Antibiotics    Past Medical History:   Zeina Nieves 100 MG Oral Tab Take 0.5 tablets (50 mg total) by mouth every 8 (eight) hours. Goal to get back to regular dosing of 100 mg three times daily     • isosorbide dinitrate 20 MG Oral Tab Take 0.5 tablets (10 mg total) by mouth TID (Nitrates).  Goal to get back Psychiatric/Behavioral: Negative for sleep disturbance. Physical exam:  /70   Pulse 80   Temp 97.4 °F (36.3 °C)   Ht 73\"   Wt (!) 357 lb (161.9 kg)   SpO2 96%   BMI 47.10 kg/m²   Body mass index is 47.1 kg/m².   Physical Exam   Vitals reviewe patient pathophysiology of diabetes, clinical signifiance of A1c, adverse effects of suboptimal glucose control, and goals of therapy   Discussed the A1C test, what the value reflects and the goal for the patient.    Discussed w patient glucose targets (Fas FLEXPEN) 100 UNIT/ML Subcutaneous Solution Pen-injector          Sig: Uses up to 10 units three times daily          Dispense:  15 mL          Refill:  0      DM Quality Indicators:  A1C/Blood pressure:  as reported above   Nephropathy Screen: needs update

## 2020-10-12 ENCOUNTER — TELEPHONE (OUTPATIENT)
Dept: FAMILY MEDICINE CLINIC | Facility: CLINIC | Age: 50
End: 2020-10-12

## 2020-10-13 ENCOUNTER — OFFICE VISIT (OUTPATIENT)
Dept: FAMILY MEDICINE CLINIC | Facility: CLINIC | Age: 50
End: 2020-10-13
Payer: COMMERCIAL

## 2020-10-13 VITALS
RESPIRATION RATE: 18 BRPM | HEART RATE: 95 BPM | OXYGEN SATURATION: 98 % | SYSTOLIC BLOOD PRESSURE: 124 MMHG | BODY MASS INDEX: 47 KG/M2 | TEMPERATURE: 96 F | WEIGHT: 315 LBS | DIASTOLIC BLOOD PRESSURE: 76 MMHG

## 2020-10-13 DIAGNOSIS — I42.8 NON-ISCHEMIC CARDIOMYOPATHY (HCC): ICD-10-CM

## 2020-10-13 DIAGNOSIS — I50.23 ACUTE ON CHRONIC SYSTOLIC CONGESTIVE HEART FAILURE (HCC): Primary | ICD-10-CM

## 2020-10-13 DIAGNOSIS — Z23 NEED FOR VACCINATION: ICD-10-CM

## 2020-10-13 DIAGNOSIS — E11.65 UNCONTROLLED TYPE 2 DIABETES MELLITUS WITH HYPERGLYCEMIA (HCC): ICD-10-CM

## 2020-10-13 DIAGNOSIS — E66.01 MORBID OBESITY WITH BMI OF 45.0-49.9, ADULT (HCC): ICD-10-CM

## 2020-10-13 DIAGNOSIS — Z99.89 OSA ON CPAP: ICD-10-CM

## 2020-10-13 DIAGNOSIS — R22.2 ABDOMINAL WALL MASS: ICD-10-CM

## 2020-10-13 DIAGNOSIS — G47.33 OSA ON CPAP: ICD-10-CM

## 2020-10-13 PROCEDURE — 3074F SYST BP LT 130 MM HG: CPT | Performed by: EMERGENCY MEDICINE

## 2020-10-13 PROCEDURE — 90471 IMMUNIZATION ADMIN: CPT | Performed by: EMERGENCY MEDICINE

## 2020-10-13 PROCEDURE — 3078F DIAST BP <80 MM HG: CPT | Performed by: EMERGENCY MEDICINE

## 2020-10-13 PROCEDURE — 99214 OFFICE O/P EST MOD 30 MIN: CPT | Performed by: EMERGENCY MEDICINE

## 2020-10-13 PROCEDURE — 90686 IIV4 VACC NO PRSV 0.5 ML IM: CPT | Performed by: EMERGENCY MEDICINE

## 2020-10-13 PROCEDURE — 1111F DSCHRG MED/CURRENT MED MERGE: CPT | Performed by: EMERGENCY MEDICINE

## 2020-10-13 RX ORDER — DIGOXIN 250 MCG
250 TABLET ORAL DAILY
Qty: 90 TABLET | Refills: 0 | Status: SHIPPED | OUTPATIENT
Start: 2020-10-13

## 2020-10-13 NOTE — PATIENT INSTRUCTIONS
Thank you for choosing [address] [place] [address] Group  To Do:  FOR RICKIE STROUD      1. Follow up with Cardiology  2. Follow up with Weight loss clinic  3. Follow up with pulmonology for sleep apnea, Dr. Katherine Aguirre  4. Follow up with DM clinic as scheduled  5.  Low salt and blood vessels. It also causes changes to your lungs that can make it harder to breathe and for your lungs to work. Smoking reduces the oxygen in your blood. Having less oxygen in your blood will make your heart work harder and beat faster.  This can cau provider  Call your healthcare provider if you:   · Faint or have dizzy spells  · Notice new symptoms from your medicine  · Have a new onset of coughing. This is especially true if the sputum is frothy or foamy.   · Have trouble breathing, especially if it without salt  · Dry beans, cooked without salt  · Tofu, stir-fried without salt  · Unsalted fresh fruit and vegetables, or frozen or canned fruit and vegetables with no added salt  Stay away from  · Lunch or deli meat that is cured or smoked  · Cheese  · T

## 2020-10-13 NOTE — PROGRESS NOTES
Chief Complaint:   Patient presents with:  Hospital F/U: Heart failure     HPI:   This is a 53year old male     UMBILICAL LUMP  Abdominal pain/lump/. Complains to a tender lump to his abdominal wall. Has noted a lump around his umbilicus.   Feels like Grandfather    • Hypertension Maternal Grandfather    • Diabetes Maternal Grandfather      Allergies:    Sulfa Antibiotics       HIVES  Current Meds:    •  Continuous Blood Gluc Sensor (FREESTYLE MARCELLO 2 SENSOR SYSTM) Does not apply Misc, 1 Device by Does Chew 1 tablet (81 mg total) by mouth daily. , Disp: 30 tablet, Rfl: 0    No current facility-administered medications on file prior to visit.       Counseling given: Not Answered         PROBLEM LIST     Patient Active Problem List:     Elevated troponin 0.25 MG Oral Tab; Take 1 tablet (250 mcg total) by mouth daily. Dispense: 90 tablet; Refill: 0  - Sacubitril-Valsartan  MG Oral Tab; Take 1 tablet by mouth 2 (two) times daily. Dispense: 180 tablet; Refill: 0    2.  Morbid obesity with BMI of 45.0-4

## 2020-10-19 NOTE — TELEPHONE ENCOUNTER
Triage has [facility] paperwork for patient. Did you discuss at his 10/13 OV?

## 2020-10-19 NOTE — TELEPHONE ENCOUNTER
Discussed with Annabelle Cage who says she referred patient to his cardiologist form completion of forms. I called patient and he confirmed he no longer needs these forms completed. (Forms will be held in triage for a few more days just in case).

## 2020-10-21 ENCOUNTER — NURSE ONLY (OUTPATIENT)
Dept: ENDOCRINOLOGY CLINIC | Facility: CLINIC | Age: 50
End: 2020-10-21
Payer: COMMERCIAL

## 2020-10-21 VITALS — WEIGHT: 315 LBS | BODY MASS INDEX: 48 KG/M2 | TEMPERATURE: 97 F

## 2020-10-21 DIAGNOSIS — E11.65 UNCONTROLLED TYPE 2 DIABETES MELLITUS WITH HYPERGLYCEMIA (HCC): ICD-10-CM

## 2020-10-21 PROCEDURE — G0108 DIAB MANAGE TRN  PER INDIV: HCPCS | Performed by: DIETITIAN, REGISTERED

## 2020-10-21 NOTE — PROGRESS NOTES
Lori Anne  [facility][birth year][facility][birth year] was seen for Continuous Glucose Sensor Follow-up Visit:    Date: 10/21/2020  Referring Provider: MITCHELL Coyle  Start time: 1:00pm  End time: 2:00pm    Sensor Type: Padmini 2     Site Assessment: sensor intact  CGM Analysis of da

## 2020-10-26 ENCOUNTER — TELEPHONE (OUTPATIENT)
Dept: CARDIOLOGY | Age: 50
End: 2020-10-26

## 2020-11-01 ENCOUNTER — OFFICE VISIT (OUTPATIENT)
Dept: SLEEP CENTER | Age: 50
End: 2020-11-01
Attending: HOSPITALIST
Payer: COMMERCIAL

## 2020-11-01 DIAGNOSIS — G47.33 OSA ON CPAP: ICD-10-CM

## 2020-11-01 DIAGNOSIS — Z99.89 OSA ON CPAP: ICD-10-CM

## 2020-11-01 PROCEDURE — 95810 POLYSOM 6/> YRS 4/> PARAM: CPT

## 2020-11-02 ENCOUNTER — TELEPHONE (OUTPATIENT)
Dept: FAMILY MEDICINE CLINIC | Facility: CLINIC | Age: 50
End: 2020-11-02

## 2020-11-02 NOTE — TELEPHONE ENCOUNTER
Below message received from My Chart Patient Clinical Update:    Problems   The patient or proxy has not reviewed this information.    Medications   The patient or proxy has not reviewed this information, and there are updates pending:   Requested Medicatio

## 2020-11-10 NOTE — PROCEDURES
[address]       Accredited by the [facility] (AASM)    PATIENT'S NAME:        RICKIE Gonzales  ATTENDING PHYSICIAN:   Patrica Pascual DO  REFERRING PHYSICIAN:   Olman Murray M.D.   PATIENT A moderate snoring was recorded, but overall there were 151 respiratory events, 145 were obstructive hypopneas and 6 obstructive apneas. The overall apnea-hypopnea index was 120 with an SaO2 elva of 73.8%.   Time spent with oxyhemoglobin saturation levels b

## 2020-11-17 DIAGNOSIS — E11.65 UNCONTROLLED TYPE 2 DIABETES MELLITUS WITH HYPERGLYCEMIA (HCC): ICD-10-CM

## 2020-11-17 RX ORDER — ISOSORBIDE DINITRATE 20 MG/1
TABLET ORAL
Qty: 30 TABLET | Refills: 0 | OUTPATIENT
Start: 2020-11-17

## 2020-11-23 NOTE — TELEPHONE ENCOUNTER
Please call pt and ask which meal time insulin he is taking  When I saw him last, he was taking novolog - not humalog   Will not refill insulin until confirmation w pt on prandial insulin brand and dosing

## 2020-12-03 ENCOUNTER — OFFICE VISIT (OUTPATIENT)
Dept: FAMILY MEDICINE CLINIC | Facility: CLINIC | Age: 50
End: 2020-12-03
Payer: COMMERCIAL

## 2020-12-03 VITALS
SYSTOLIC BLOOD PRESSURE: 136 MMHG | HEART RATE: 101 BPM | DIASTOLIC BLOOD PRESSURE: 88 MMHG | HEIGHT: 74 IN | OXYGEN SATURATION: 96 % | WEIGHT: 315 LBS | BODY MASS INDEX: 40.43 KG/M2 | TEMPERATURE: 98 F | RESPIRATION RATE: 20 BRPM

## 2020-12-03 DIAGNOSIS — Z20.822 SUSPECTED COVID-19 VIRUS INFECTION: Primary | ICD-10-CM

## 2020-12-03 PROCEDURE — 99213 OFFICE O/P EST LOW 20 MIN: CPT | Performed by: NURSE PRACTITIONER

## 2020-12-03 PROCEDURE — 3008F BODY MASS INDEX DOCD: CPT | Performed by: NURSE PRACTITIONER

## 2020-12-03 PROCEDURE — 3079F DIAST BP 80-89 MM HG: CPT | Performed by: NURSE PRACTITIONER

## 2020-12-03 PROCEDURE — 3075F SYST BP GE 130 - 139MM HG: CPT | Performed by: NURSE PRACTITIONER

## 2020-12-03 NOTE — PATIENT INSTRUCTIONS
Coronavirus Disease 2019 (COVID-19): Caring for Yourself or Others   If you or a household member have symptoms of COVID-19, follow these guidelines for preventing spread of the virus, and managing symptoms.    If you think you have COVID-19 symptoms  · S · Tell the healthcare staff about recent travel. This includes local travel on public transport. Staff may need to find other people you have been in contact with. · Follow all instructions the healthcare staff give you.     If you have been diagnosed with There is currently no vaccine or medicine approved to prevent the virus. The FDA has approved an antiviral medicine called remdesivir for people in the hospital. It is for people 12 years and older who weigh more than about 88 pounds (40 kgs).  Remdesivir i If you've had confirmed COVID-19, your healthcare team may ask you to consider donating your plasma. This is called COVID-19 convalescent plasma donation.  Plasma from people fully recovered from COVID-19 may contain antibodies to help fight COVID-19 in peo Your limits are different if you've had COVID-19 in the last 3 months but are fully recovered without symptoms and you have been exposed to someone with COVID-19. If you are symptom-free, you don't need to stay home away from others or be retested.  The CDC When you return to public settings  When you are well enough to go outside your home, consider the CDC's guidance on cloth face masks:     · The CDC advises all people over age 2 to wear cloth face masks in public settings when around people outside of the © 1947-1456 [employer] [address]. All rights reserved. This information is not intended as a substitute for professional medical care. Always follow your healthcare professional's instructions.

## 2020-12-04 DIAGNOSIS — J45.20 MILD INTERMITTENT ASTHMA, UNSPECIFIED WHETHER COMPLICATED: ICD-10-CM

## 2020-12-04 RX ORDER — ALBUTEROL SULFATE 2.5 MG/3ML
2.5 SOLUTION RESPIRATORY (INHALATION) EVERY 6 HOURS PRN
Qty: 75 ML | Refills: 0 | Status: SHIPPED | OUTPATIENT
Start: 2020-12-04

## 2020-12-08 ENCOUNTER — TELEMEDICINE (OUTPATIENT)
Dept: FAMILY MEDICINE CLINIC | Facility: CLINIC | Age: 50
End: 2020-12-08
Payer: COMMERCIAL

## 2020-12-08 DIAGNOSIS — I50.22 CHRONIC SYSTOLIC CHF (CONGESTIVE HEART FAILURE) (HCC): ICD-10-CM

## 2020-12-08 DIAGNOSIS — R05.8 COUGH WITH EXPOSURE TO COVID-19 VIRUS: Primary | ICD-10-CM

## 2020-12-08 DIAGNOSIS — Z20.822 COUGH WITH EXPOSURE TO COVID-19 VIRUS: Primary | ICD-10-CM

## 2020-12-08 PROCEDURE — 99213 OFFICE O/P EST LOW 20 MIN: CPT | Performed by: EMERGENCY MEDICINE

## 2020-12-08 RX ORDER — ALBUTEROL SULFATE 90 UG/1
1-2 AEROSOL, METERED RESPIRATORY (INHALATION) EVERY 4 HOURS PRN
Qty: 1 INHALER | Refills: 0 | Status: SHIPPED | OUTPATIENT
Start: 2020-12-08 | End: 2021-04-30

## 2020-12-08 NOTE — PROGRESS NOTES
This is a telemedicine visit with live, interactive video and audio. Hannah Herbert verbally consents to a Virtual/Telephone Check-In visit on 12/08/20.     Patient understands and accepts financial responsibility for any deductible, co-insurance and/or co Sulfate  (90 Base) MCG/ACT Inhalation Aero Soln Inhale 1-2 puffs into the lungs every 4 (four) hours as needed for Wheezing.  1 Inhaler 0   • albuterol sulfate (2.5 MG/3ML) 0.083% Inhalation Nebu Soln Take 3 mL (2.5 mg total) by nebulization every 6 Chew 1 tablet (81 mg total) by mouth daily.  30 tablet 0       OBJECTIVE  Physical Exam:   alert, appears stated age and cooperative, Speaking in full sentences comfortably, Normal work of breathing and Skin color, texture, turgor normal. No rashes or lesio

## 2020-12-09 ENCOUNTER — HOSPITAL ENCOUNTER (OUTPATIENT)
Dept: GENERAL RADIOLOGY | Age: 50
Discharge: HOME | End: 2020-12-09
Attending: EMERGENCY MEDICINE
Payer: COMMERCIAL

## 2020-12-09 ENCOUNTER — TELEPHONE (OUTPATIENT)
Dept: FAMILY MEDICINE CLINIC | Facility: CLINIC | Age: 50
End: 2020-12-09

## 2020-12-09 ENCOUNTER — LAB ENCOUNTER (OUTPATIENT)
Dept: LAB | Age: 50
End: 2020-12-09
Attending: EMERGENCY MEDICINE
Payer: COMMERCIAL

## 2020-12-09 DIAGNOSIS — Z20.822 COUGH WITH EXPOSURE TO COVID-19 VIRUS: ICD-10-CM

## 2020-12-09 DIAGNOSIS — R05.8 COUGH WITH EXPOSURE TO COVID-19 VIRUS: ICD-10-CM

## 2020-12-09 PROCEDURE — 71046 X-RAY EXAM CHEST 2 VIEWS: CPT | Performed by: EMERGENCY MEDICINE

## 2020-12-09 RX ORDER — CARVEDILOL 25 MG/1
TABLET ORAL
Qty: 30 TABLET | Refills: 0 | OUTPATIENT
Start: 2020-12-09

## 2020-12-09 NOTE — TELEPHONE ENCOUNTER
Pt calling in, would like to talk to someone regarding his test results from his chest xray that he had completed today.  Please advise

## 2020-12-10 PROBLEM — R79.89 ELEVATED TROPONIN: Status: RESOLVED | Noted: 2018-04-25 | Resolved: 2020-12-10

## 2020-12-10 PROBLEM — R77.8 ELEVATED TROPONIN: Status: RESOLVED | Noted: 2018-04-25 | Resolved: 2020-12-10

## 2020-12-10 PROBLEM — N39.0 URINARY TRACT INFECTION WITHOUT HEMATURIA: Status: RESOLVED | Noted: 2018-07-29 | Resolved: 2020-12-10

## 2020-12-11 ENCOUNTER — TELEPHONE (OUTPATIENT)
Dept: FAMILY MEDICINE CLINIC | Facility: CLINIC | Age: 50
End: 2020-12-11

## 2020-12-11 ENCOUNTER — TELEPHONE (OUTPATIENT)
Dept: CARDIOLOGY | Age: 50
End: 2020-12-11

## 2020-12-11 RX ORDER — TORSEMIDE 20 MG/1
40 TABLET ORAL 2 TIMES DAILY
COMMUNITY
End: 2020-12-11 | Stop reason: SDUPTHER

## 2020-12-11 RX ORDER — TORSEMIDE 20 MG/1
40 TABLET ORAL 2 TIMES DAILY
Qty: 120 TABLET | Refills: 0 | Status: SHIPPED | OUTPATIENT
Start: 2020-12-11 | End: 2021-05-06 | Stop reason: SDUPTHER

## 2020-12-11 NOTE — TELEPHONE ENCOUNTER
Called patient for condition update  Still with cough and fatigue,   C/O mild SOB  Feels like he is slightly improved  No CP, gets winded easily  Torsemide increased recently  Denies any leg swelling  Does not have a weighing scale to check weight  /

## 2020-12-12 ENCOUNTER — LAB ENCOUNTER (OUTPATIENT)
Dept: LAB | Age: 50
End: 2020-12-12
Attending: EMERGENCY MEDICINE
Payer: COMMERCIAL

## 2020-12-12 DIAGNOSIS — J81.0 ACUTE PULMONARY EDEMA (HCC): ICD-10-CM

## 2020-12-12 DIAGNOSIS — I42.0 DILATED CARDIOMYOPATHY (HCC): ICD-10-CM

## 2020-12-12 DIAGNOSIS — E11.65 UNCONTROLLED TYPE 2 DIABETES MELLITUS WITH HYPERGLYCEMIA (HCC): ICD-10-CM

## 2020-12-12 PROCEDURE — 80053 COMPREHEN METABOLIC PANEL: CPT | Performed by: EMERGENCY MEDICINE

## 2020-12-12 PROCEDURE — 36415 COLL VENOUS BLD VENIPUNCTURE: CPT | Performed by: EMERGENCY MEDICINE

## 2020-12-12 PROCEDURE — 83880 ASSAY OF NATRIURETIC PEPTIDE: CPT | Performed by: EMERGENCY MEDICINE

## 2020-12-12 PROCEDURE — 83036 HEMOGLOBIN GLYCOSYLATED A1C: CPT | Performed by: EMERGENCY MEDICINE

## 2020-12-12 PROCEDURE — 80061 LIPID PANEL: CPT | Performed by: EMERGENCY MEDICINE

## 2020-12-14 ENCOUNTER — TELEMEDICINE (OUTPATIENT)
Dept: FAMILY MEDICINE CLINIC | Facility: CLINIC | Age: 50
End: 2020-12-14
Payer: COMMERCIAL

## 2020-12-14 DIAGNOSIS — R05.8 COUGH WITH EXPOSURE TO COVID-19 VIRUS: ICD-10-CM

## 2020-12-14 DIAGNOSIS — Z20.822 COUGH WITH EXPOSURE TO COVID-19 VIRUS: ICD-10-CM

## 2020-12-14 DIAGNOSIS — I50.21 ACUTE SYSTOLIC (CONGESTIVE) HEART FAILURE (HCC): Primary | ICD-10-CM

## 2020-12-14 DIAGNOSIS — I42.0 DILATED CARDIOMYOPATHY (HCC): Primary | ICD-10-CM

## 2020-12-14 PROCEDURE — 99213 OFFICE O/P EST LOW 20 MIN: CPT | Performed by: FAMILY MEDICINE

## 2020-12-14 NOTE — PROGRESS NOTES
Virtual Telephone Check-In    Liyah Case verbally consents to a Virtual/Telephone Check-In visit on 12/14/20. Patient has been referred to the [facility] website at [email or web address] to review the yearly Consent to Treat document.     Patient understands therefore advised that if his symptoms remain consistent or worsening that he will need to go to the ER for possible IV diuresis. Monitor daily weights, lower extremity swelling. Patient expressed understanding. –Return for 1 week follow-up.       Cough wi

## 2020-12-16 ENCOUNTER — TELEPHONE (OUTPATIENT)
Dept: FAMILY MEDICINE CLINIC | Facility: CLINIC | Age: 50
End: 2020-12-16

## 2020-12-16 ENCOUNTER — HOSPITAL ENCOUNTER (INPATIENT)
Facility: HOSPITAL | Age: 50
LOS: 1 days | Discharge: HOME | DRG: 204 | End: 2020-12-17
Attending: EMERGENCY MEDICINE | Admitting: HOSPITALIST
Payer: COMMERCIAL

## 2020-12-16 ENCOUNTER — APPOINTMENT (OUTPATIENT)
Dept: GENERAL RADIOLOGY | Facility: HOSPITAL | Age: 50
DRG: 204 | End: 2020-12-16
Payer: COMMERCIAL

## 2020-12-16 DIAGNOSIS — E11.65 TYPE 2 DIABETES MELLITUS WITH HYPERGLYCEMIA, WITHOUT LONG-TERM CURRENT USE OF INSULIN (HCC): ICD-10-CM

## 2020-12-16 DIAGNOSIS — I50.9 ACUTE ON CHRONIC CONGESTIVE HEART FAILURE, UNSPECIFIED HEART FAILURE TYPE (HCC): Primary | ICD-10-CM

## 2020-12-16 DIAGNOSIS — R09.02 HYPOXIA: ICD-10-CM

## 2020-12-16 DIAGNOSIS — I42.8 NICM (NONISCHEMIC CARDIOMYOPATHY) (HCC): ICD-10-CM

## 2020-12-16 PROBLEM — E87.1 HYPONATREMIA: Status: ACTIVE | Noted: 2020-12-16

## 2020-12-16 PROBLEM — E87.3 METABOLIC ALKALOSIS: Status: ACTIVE | Noted: 2020-12-16

## 2020-12-16 PROCEDURE — 71045 X-RAY EXAM CHEST 1 VIEW: CPT

## 2020-12-16 PROCEDURE — 99223 1ST HOSP IP/OBS HIGH 75: CPT | Performed by: HOSPITALIST

## 2020-12-16 RX ORDER — ONDANSETRON 2 MG/ML
4 INJECTION INTRAMUSCULAR; INTRAVENOUS EVERY 6 HOURS PRN
Status: DISCONTINUED | OUTPATIENT
Start: 2020-12-16 | End: 2020-12-17

## 2020-12-16 RX ORDER — ISOSORBIDE DINITRATE 10 MG/1
10 TABLET ORAL
Status: DISCONTINUED | OUTPATIENT
Start: 2020-12-16 | End: 2020-12-17

## 2020-12-16 RX ORDER — DEXTROSE MONOHYDRATE 25 G/50ML
50 INJECTION, SOLUTION INTRAVENOUS
Status: DISCONTINUED | OUTPATIENT
Start: 2020-12-16 | End: 2020-12-17

## 2020-12-16 RX ORDER — ROSUVASTATIN CALCIUM 20 MG/1
20 TABLET, COATED ORAL NIGHTLY
COMMUNITY

## 2020-12-16 RX ORDER — CARVEDILOL 25 MG/1
25 TABLET ORAL 2 TIMES DAILY WITH MEALS
COMMUNITY

## 2020-12-16 RX ORDER — FUROSEMIDE 10 MG/ML
20 INJECTION INTRAMUSCULAR; INTRAVENOUS
Status: DISCONTINUED | OUTPATIENT
Start: 2020-12-16 | End: 2020-12-17

## 2020-12-16 RX ORDER — HYDRALAZINE HYDROCHLORIDE 50 MG/1
50 TABLET, FILM COATED ORAL EVERY 8 HOURS SCHEDULED
Status: DISCONTINUED | OUTPATIENT
Start: 2020-12-16 | End: 2020-12-17

## 2020-12-16 RX ORDER — ACETAMINOPHEN 325 MG/1
650 TABLET ORAL EVERY 6 HOURS PRN
Status: DISCONTINUED | OUTPATIENT
Start: 2020-12-16 | End: 2020-12-17

## 2020-12-16 RX ORDER — FUROSEMIDE 10 MG/ML
40 INJECTION INTRAMUSCULAR; INTRAVENOUS ONCE
Status: COMPLETED | OUTPATIENT
Start: 2020-12-16 | End: 2020-12-16

## 2020-12-16 RX ORDER — ENOXAPARIN SODIUM 100 MG/ML
0.5 INJECTION SUBCUTANEOUS DAILY
Status: DISCONTINUED | OUTPATIENT
Start: 2020-12-16 | End: 2020-12-17

## 2020-12-16 RX ORDER — CARVEDILOL 12.5 MG/1
25 TABLET ORAL 2 TIMES DAILY WITH MEALS
Status: DISCONTINUED | OUTPATIENT
Start: 2020-12-16 | End: 2020-12-17

## 2020-12-16 RX ORDER — DIGOXIN 125 MCG
250 TABLET ORAL DAILY
Status: DISCONTINUED | OUTPATIENT
Start: 2020-12-16 | End: 2020-12-17

## 2020-12-16 RX ORDER — ROSUVASTATIN CALCIUM 20 MG/1
20 TABLET, COATED ORAL NIGHTLY
Status: DISCONTINUED | OUTPATIENT
Start: 2020-12-16 | End: 2020-12-17

## 2020-12-16 RX ORDER — SPIRONOLACTONE 25 MG/1
50 TABLET ORAL 2 TIMES DAILY
Status: DISCONTINUED | OUTPATIENT
Start: 2020-12-16 | End: 2020-12-17

## 2020-12-16 RX ORDER — ASPIRIN 81 MG/1
81 TABLET, CHEWABLE ORAL DAILY
Status: DISCONTINUED | OUTPATIENT
Start: 2020-12-17 | End: 2020-12-17

## 2020-12-16 NOTE — ED INITIAL ASSESSMENT (HPI)
Per patient, shortness of breath with lying down and exertion. Reports chest pain at night only, denies at this time. Reports hyperglycemic readings at home. AAO x 4.

## 2020-12-16 NOTE — H&P
[facility] HOSPITALIST  History and Physical     Will Barrera Patient Status:  Emergency    1970 MRN [id number]   Location [address] Attending Rodrigo Glover MD   Hosp Day # 0 PCP Karoline Benitez MD     Chief Complaint: Azalea Randall facility-administered medications on file prior to encounter. •  carvedilol 25 MG Oral Tab, Take 25 mg by mouth 2 (two) times daily with meals. , Disp: , Rfl:     •  Rosuvastatin Calcium 20 MG Oral Tab, Take 20 mg by mouth nightly., Disp: , Rfl:     • Rfl: 0    •  spironolactone 25 MG Oral Tab, Take 50 mg by mouth 2 (two) times daily. , Disp: , Rfl:     •  aspirin 81 MG Oral Chew Tab, Chew 1 tablet (81 mg total) by mouth daily. , Disp: 30 tablet, Rfl: 0    •  [DISCONTINUED] CARVEDILOL 25 MG Oral Tab, TA hours. Recent Labs   Lab 12/16/20  1353   TROP 0.126*       Imaging: Imaging data reviewed in Epic. ASSESSMENT / PLAN:     1. Dyspnea  1. Mild vascular congestion on CXR  1. IV lasix ordered   2. CXR without PNA  3.  Rapid COVID negative, PCR pendin

## 2020-12-16 NOTE — ED PROVIDER NOTES
Patient Seen in: [facility] Emergency Department      History   Patient presents with:  Difficulty Breathing  Cough/URI  Hyperglycemia    Stated Complaint: FLORIDALMA/cough    HPI    44-year-old male complaining of shortness of breath.   This patient states noted in HPI. Constitutional and vital signs reviewed. All other systems reviewed and negative except as noted above.     Physical Exam     ED Triage Vitals   BP 12/16/20 1347 (!) 173/100   Pulse 12/16/20 1347 94   Resp 12/16/20 1347 16   Temp 12/16/2 NATRIURETIC PEPTIDE - Normal   RAPID SARS-COV-2 BY PCR - Normal   RESPIRATORY PANEL FLU EXPANDED - Normal    Narrative:     SARS, MERS and COVID-19 coronaviruses are not tested on this assay, and cross-reactivity with other coronaviruses on this panel is n elevated but it has been somewhat chronically elevated. Chest x-ray shows improved CHF however his O2 sat would drop into the 80s at times. He was given IV Lasix and admitted.   Admission disposition: 12/16/2020  4:25 PM                              Dispo

## 2020-12-16 NOTE — ED NOTES
Patient states at this time he has been coughing up mucous that is thick and white. Patient also had GI symptoms of nausea without vomiting and 10 days of diarrhea that resolved yesterday. Patient's live in girlfriend has Conrad.

## 2020-12-17 ENCOUNTER — APPOINTMENT (OUTPATIENT)
Dept: CT IMAGING | Facility: HOSPITAL | Age: 50
DRG: 204 | End: 2020-12-17
Attending: HOSPITALIST
Payer: COMMERCIAL

## 2020-12-17 VITALS
BODY MASS INDEX: 47 KG/M2 | HEART RATE: 73 BPM | WEIGHT: 315 LBS | OXYGEN SATURATION: 96 % | SYSTOLIC BLOOD PRESSURE: 113 MMHG | DIASTOLIC BLOOD PRESSURE: 73 MMHG | RESPIRATION RATE: 18 BRPM | TEMPERATURE: 98 F

## 2020-12-17 PROCEDURE — 99253 IP/OBS CNSLTJ NEW/EST LOW 45: CPT | Performed by: INTERNAL MEDICINE

## 2020-12-17 PROCEDURE — 99238 HOSP IP/OBS DSCHRG MGMT 30/<: CPT | Performed by: HOSPITALIST

## 2020-12-17 PROCEDURE — 74176 CT ABD & PELVIS W/O CONTRAST: CPT | Performed by: HOSPITALIST

## 2020-12-17 RX ORDER — POTASSIUM CHLORIDE 20 MEQ/1
40 TABLET, EXTENDED RELEASE ORAL ONCE
Status: COMPLETED | OUTPATIENT
Start: 2020-12-17 | End: 2020-12-17

## 2020-12-17 RX ORDER — PEN NEEDLE, DIABETIC 32GX 5/32"
NEEDLE, DISPOSABLE MISCELLANEOUS
Qty: 100 EACH | Refills: 6 | Status: SHIPPED | OUTPATIENT
Start: 2020-12-17

## 2020-12-17 NOTE — CONSULTS
[facility] Cardiology Consult Note    Lisa Saha Patient Status:  Inpatient    1970 MRN [id number]   [facility] 3NW-A Attending Tobi Hussein, [address] Day # 1 PCP Stephen Connor MD     53year old male, consulted for heart failure of kidney    • Congestive heart disease ([facility]    • Depression    • Diabetes ([facility]    • Dyspnea    • Elevated troponin    • Essential hypertension    • High blood pressure    • High cholesterol    • Obesity    • Prostatitis    • Sleep apnea      Past Surgic

## 2020-12-17 NOTE — COVID NURSING ASSESSMENT
COVID-19 Daily Discharge Readiness-Nursing    O2 Sat at Rest:     90-96% 2L/NC  O2 Sat with Exertion:    % on    liters   Temperature max from last 24 hrs: Temp (24hrs), Av.2 °F (36.8 °C), Min:97.8 °F (36.6 °C), Max:98.4 °F (36.9 °C)    Inflammatory Ma

## 2020-12-17 NOTE — PLAN OF CARE
NURSING ADMISSION NOTE      Patient admitted via Cart  Oriented to room. Safety precautions initiated. Bed in low position. Call light in reach.     Received pt at 1800  A/OX4, 2L O2 NC, VSS, SR per Tele  Denies any pain at this time  Pt reports prod

## 2020-12-17 NOTE — PROGRESS NOTES
[facility]  Cardiology Progress Note    Gamaliel Cheema Patient Status:  Inpatient    1970 MRN [id number]   [facility] 3NW-A Attending Mariposa Read MD   Hosp Day # 1 PCP Halle Mcmahon MD     Subjective:  Patient states that his br spironolactone  50 mg Oral BID   • enoxaparin  0.5 mg/kg Subcutaneous Daily   • Insulin Aspart Pen  1-10 Units Subcutaneous TID AC and HS         Assessment:  · Dyspnea, improved - Covid negative x 2, viral panel negative.  Was given IV lasix in the ER yest

## 2020-12-17 NOTE — PAYOR COMM NOTE
--------------  ADMISSION REVIEW     Payor: [facility]  Subscriber #:  [id number]  Authorization Number: [employer]    Admit date: 12/16/20  Admit time: 18       Admitting Physician: Francisco Tee DO  Attending Physician:  Michelle Garcia MD Temp 98.2 °F (36.8 °C) (Oral)   Resp 20   Wt (!) 170.1 kg   SpO2 100%   BMI 48.15 kg/m²         Physical Exam    Patient is alert and oriented x3 his O2 sat were no was in the room was around 9192%  HEENT exam within normal limits  Neck shows no lymphadeno Abnormality         Status                     ---------                               -----------         ------                     CBC W/ DIFFERENTIAL[[id number]]          Abnormal            Final result                 Please view results for these t heart failure (HCC) I50.9 12/16/2020 Unknown    Hyponatremia E87.1 12/16/2020 Yes    Hypoxia R09.02 87/24/0214     Metabolic alkalosis C85.1 39/41/3106 Yes    NICM (nonischemic cardiomyopathy) ([facility] I42.8 Unknown Unknown           Signed by Charla Prader Musculoskeletal: Moves all extremities. Extremities: No edema or cyanosis. Integument: No rashes or lesions. Psychiatric: Appropriate mood and affect.       Diagnostic Data:      Labs:  Recent Labs   Lab 12/16/20  1353   WBC 7.4   HGB 14.6   MCV 77.4* Basilia Escamilla      digoxin (LANOXIN) tab 250 mcg     Date Action Dose Route User    12/16/2020 2022 Given 250 mcg Oral Alycia Olivas RN      enoxaparin sodium (LOVENOX) 100 MG/ML injection 90 mg     Date Action Dose Route User    12/16/2020 2020 Give tablet Oral Francine, Basilia    12/16/2020 2021 Given 1 tablet Oral Courtney York, RN      spironolactone (ALDACTONE) tab 50 mg     Date Action Dose Route User    12/17/2020 0850 Given 50 mg Oral Francine, Basilia    12/16/2020 2022 Given 50 mg Oral Kirstie Morton pertinent findings above.   ROS     History:       Past Medical History:   Diagnosis Date   • Calculus of kidney     • Congestive heart disease (HCC)     • Depression     • Diabetes (HCC)     • Dyspnea     • Elevated troponin     • Essential hypertension

## 2020-12-17 NOTE — PLAN OF CARE
COVID-19 Daily Discharge Readiness-Nursing    O2 Sat at Rest:    95 % on RA   O2 Sat with Exertion:    % on    liters   Temperature max from last 24 hrs: Temp (24hrs), Av.1 °F (36.7 °C), Min:97.6 °F (36.4 °C), Max:98.4 °F (36.9 °C)    Anne Torres

## 2020-12-17 NOTE — PROGRESS NOTES
[facility] H[facility]PITALIST  Progress Note     Kylah Ross Patient Status:  Inpatient    1970 MRN [id number]   [facility] 3NW-A Attending Trudy High MD   Hosp Day # 1 PCP Alda Santoyo MD     Chief Complaint: sob    S: Patient feels sully • Potassium Chloride ER  40 mEq Oral Once   • aspirin  81 mg Oral Daily   • carvedilol  25 mg Oral BID with meals   • digoxin  250 mcg Oral Daily   • hydrALAZINE HCl  50 mg Oral Q8H [facility]   • insulin detemir  20 Units Subcutaneous Q12H   • isosorbide dinit

## 2020-12-18 ENCOUNTER — PATIENT OUTREACH (OUTPATIENT)
Dept: CASE MANAGEMENT | Age: 50
End: 2020-12-18

## 2020-12-18 ENCOUNTER — VIRTUAL PHONE E/M (OUTPATIENT)
Dept: FAMILY MEDICINE CLINIC | Facility: CLINIC | Age: 50
End: 2020-12-18
Payer: COMMERCIAL

## 2020-12-18 ENCOUNTER — LAB ENCOUNTER (OUTPATIENT)
Dept: LAB | Age: 50
End: 2020-12-18
Attending: FAMILY MEDICINE
Payer: COMMERCIAL

## 2020-12-18 ENCOUNTER — TELEPHONE (OUTPATIENT)
Dept: FAMILY MEDICINE CLINIC | Facility: CLINIC | Age: 50
End: 2020-12-18

## 2020-12-18 ENCOUNTER — TELEPHONE (OUTPATIENT)
Dept: CARDIOLOGY | Age: 50
End: 2020-12-18

## 2020-12-18 DIAGNOSIS — R10.9 LEFT FLANK PAIN: ICD-10-CM

## 2020-12-18 DIAGNOSIS — I50.9 ACUTE ON CHRONIC CONGESTIVE HEART FAILURE, UNSPECIFIED HEART FAILURE TYPE (HCC): ICD-10-CM

## 2020-12-18 DIAGNOSIS — I50.9 ACUTE ON CHRONIC CONGESTIVE HEART FAILURE, UNSPECIFIED HEART FAILURE TYPE (HCC): Primary | ICD-10-CM

## 2020-12-18 DIAGNOSIS — R06.02 SHORTNESS OF BREATH: ICD-10-CM

## 2020-12-18 DIAGNOSIS — Z02.9 ENCOUNTERS FOR UNSPECIFIED ADMINISTRATIVE PURPOSE: ICD-10-CM

## 2020-12-18 DIAGNOSIS — R10.9 LEFT FLANK PAIN: Primary | ICD-10-CM

## 2020-12-18 DIAGNOSIS — E11.65 UNCONTROLLED TYPE 2 DIABETES MELLITUS WITH HYPERGLYCEMIA (HCC): ICD-10-CM

## 2020-12-18 PROCEDURE — 36415 COLL VENOUS BLD VENIPUNCTURE: CPT | Performed by: FAMILY MEDICINE

## 2020-12-18 PROCEDURE — 87088 URINE BACTERIA CULTURE: CPT | Performed by: FAMILY MEDICINE

## 2020-12-18 PROCEDURE — 80048 BASIC METABOLIC PNL TOTAL CA: CPT | Performed by: FAMILY MEDICINE

## 2020-12-18 PROCEDURE — 99214 OFFICE O/P EST MOD 30 MIN: CPT | Performed by: FAMILY MEDICINE

## 2020-12-18 PROCEDURE — 1111F DSCHRG MED/CURRENT MED MERGE: CPT | Performed by: FAMILY MEDICINE

## 2020-12-18 PROCEDURE — 87186 SC STD MICRODIL/AGAR DIL: CPT | Performed by: FAMILY MEDICINE

## 2020-12-18 PROCEDURE — 87086 URINE CULTURE/COLONY COUNT: CPT | Performed by: FAMILY MEDICINE

## 2020-12-18 PROCEDURE — 81001 URINALYSIS AUTO W/SCOPE: CPT | Performed by: FAMILY MEDICINE

## 2020-12-18 RX ORDER — ACETAMINOPHEN AND CODEINE PHOSPHATE 300; 30 MG/1; MG/1
1 TABLET ORAL EVERY 4 HOURS PRN
Qty: 28 TABLET | Refills: 0 | Status: SHIPPED | OUTPATIENT
Start: 2020-12-18 | End: 2021-03-31

## 2020-12-18 NOTE — TELEPHONE ENCOUNTER
Patient had a virtual visit today and was under the impression that a prescription for Tylenol #3 would be sent to his pharmacy. Please advise.

## 2020-12-18 NOTE — PROGRESS NOTES
Virtual Telephone Check-In    Renetta Alcala verbally consents to a Virtual/Telephone Check-In visit on 12/18/20. Patient has been referred to the [facility] website at [email or web address] to review the yearly Consent to Treat document.     Patient understands Resp: breathing well on exam, normal effort.  Able to speak full sentences without any SOB  Skin: no rashes, no lesions, no cyanosis  Psych: normal affect       Diagnoses and all orders for this visit:    Acute on chronic congestive heart failure, unspeci

## 2020-12-18 NOTE — TELEPHONE ENCOUNTER
Pt had virtual visit today and thought Tylenol #3 would be sent to his pharmacy. Please advise. Thank you.

## 2020-12-18 NOTE — PLAN OF CARE
NURSING DISCHARGE NOTE    Discharged Home via Wheelchair. Accompanied by Support staff  Belongings Taken by patient/family. AVS reviewed with pt, belongings taken w/ pt. Left via wheelchair.

## 2020-12-18 NOTE — PROGRESS NOTES
[facility] [address]  [place], [address]  ?  12/17/20  ? Re: Jorge Birmingham  ? To Whom It May Concern:    Jorge Hayden was admitted to [facility] from 12/16/2020 to 12/17/20.     Please excuse Jorge Hayden from attending work for the

## 2020-12-20 ENCOUNTER — PATIENT MESSAGE (OUTPATIENT)
Dept: FAMILY MEDICINE CLINIC | Facility: CLINIC | Age: 50
End: 2020-12-20

## 2020-12-21 ENCOUNTER — TELEPHONE (OUTPATIENT)
Dept: FAMILY MEDICINE CLINIC | Facility: CLINIC | Age: 50
End: 2020-12-21

## 2020-12-21 NOTE — TELEPHONE ENCOUNTER
----- Message from Tosin Bingham MD sent at 12/19/2020  6:16 PM CST -----  E Coli present - will need to start antibiotic. Sent levofloxacin 750mg daily x5 days   Follow up sensitivities and notify patient if abx needs to be changed.

## 2020-12-21 NOTE — TELEPHONE ENCOUNTER
From: Olivia Mccray  To: Yuki Rain MD  Sent: 12/20/2020 7:23 AM CST  Subject: Non-Urgent Medical Question    I have a question about URINE CULTURE, ROUTINE resulted on 12/19/20, 9:33 PM.    Can someone call me

## 2020-12-21 NOTE — TELEPHONE ENCOUNTER
----- Message from Beverly Reed MD sent at 12/20/2020  9:07 AM CST -----  sens to levothyroxine. Please make sure patient has started abx and finish course.

## 2020-12-21 NOTE — TELEPHONE ENCOUNTER
----- Message from Jarret Alvarado MD sent at 12/18/2020 11:22 PM CST -----  Results reviewed. Likely UTI. Will await urine culture sensitivities before starting antibiotic therapy.

## 2020-12-22 ENCOUNTER — TELEPHONE (OUTPATIENT)
Dept: FAMILY MEDICINE CLINIC | Facility: CLINIC | Age: 50
End: 2020-12-22

## 2020-12-22 NOTE — TELEPHONE ENCOUNTER
Spoke to pt. His first missed day of work was 12/3. He went back to work yesterday 12/21 w/ no restrictions. OK to complete STD paperwork?     Note: pt would like the forms faxed to [phone] once completed, however, there is a section that needs to be comple

## 2020-12-22 NOTE — TELEPHONE ENCOUNTER
Gerardo Johnston faxed STD forms for pt. Requesting information starting from 12/3. Called pt to discuss. PSR please lync triage.

## 2020-12-22 NOTE — TELEPHONE ENCOUNTER
Forms completed and signed by Mey Mayen. Will fax to [phone] once we have received the page from pt. Will kay for follow up.

## 2020-12-23 NOTE — TELEPHONE ENCOUNTER
Called pt and received no answer, VM full. [facility] msg sent to pt informing him that his form has not yet been received by fax and requested that he inform us once he has sent it. Pt provided with fax number(s). Forms in triage - \"Christopher\" bin.

## 2020-12-23 NOTE — TELEPHONE ENCOUNTER
Patient returned call. States that the fax machine he was using last night was not properly working. Pt requested that a new copy of the form he needs to complete be faxed to [phone] (number read back and confirmed with patient).  Patient states he

## 2020-12-23 NOTE — TELEPHONE ENCOUNTER
Completed form received via fax from patient. Forms faxed to MEGHANN RETANA,  Kera Lester at [facility]. Fax confirmation received. Patient notified via mychart. Copy of forms sent to scan.    Original forms, along with attached

## 2020-12-28 ENCOUNTER — OFFICE VISIT (OUTPATIENT)
Dept: CARDIOLOGY | Age: 50
End: 2020-12-28

## 2020-12-28 ENCOUNTER — ANCILLARY ORDERS (OUTPATIENT)
Dept: CARDIOLOGY | Age: 50
End: 2020-12-28

## 2020-12-28 ENCOUNTER — TELEPHONE (OUTPATIENT)
Dept: CARDIOLOGY | Age: 50
End: 2020-12-28

## 2020-12-28 ENCOUNTER — ANCILLARY PROCEDURE (OUTPATIENT)
Dept: CARDIOLOGY | Age: 50
End: 2020-12-28
Attending: INTERNAL MEDICINE

## 2020-12-28 VITALS
HEART RATE: 50 BPM | BODY MASS INDEX: 41.75 KG/M2 | HEIGHT: 73 IN | WEIGHT: 315 LBS | SYSTOLIC BLOOD PRESSURE: 126 MMHG | DIASTOLIC BLOOD PRESSURE: 82 MMHG

## 2020-12-28 DIAGNOSIS — G47.30 SLEEP APNEA, UNSPECIFIED TYPE: ICD-10-CM

## 2020-12-28 DIAGNOSIS — I27.20 PULMONARY HTN (CMD): Primary | ICD-10-CM

## 2020-12-28 DIAGNOSIS — I50.22 CHRONIC SYSTOLIC CONGESTIVE HEART FAILURE (CMD): ICD-10-CM

## 2020-12-28 DIAGNOSIS — Z95.810 ICD (IMPLANTABLE CARDIOVERTER-DEFIBRILLATOR) IN PLACE: ICD-10-CM

## 2020-12-28 PROCEDURE — 3074F SYST BP LT 130 MM HG: CPT | Performed by: NURSE PRACTITIONER

## 2020-12-28 PROCEDURE — 93282 PRGRMG EVAL IMPLANTABLE DFB: CPT | Performed by: INTERNAL MEDICINE

## 2020-12-28 PROCEDURE — 99214 OFFICE O/P EST MOD 30 MIN: CPT | Performed by: NURSE PRACTITIONER

## 2020-12-28 PROCEDURE — 3079F DIAST BP 80-89 MM HG: CPT | Performed by: NURSE PRACTITIONER

## 2020-12-28 RX ORDER — INSULIN ASPART 100 [IU]/ML
INJECTION, SOLUTION INTRAVENOUS; SUBCUTANEOUS
COMMUNITY
Start: 2020-12-23

## 2020-12-28 RX ORDER — INSULIN DETEMIR 100 [IU]/ML
INJECTION, SOLUTION SUBCUTANEOUS
COMMUNITY
Start: 2020-09-25

## 2020-12-28 SDOH — HEALTH STABILITY: PHYSICAL HEALTH: ON AVERAGE, HOW MANY DAYS PER WEEK DO YOU ENGAGE IN MODERATE TO STRENUOUS EXERCISE (LIKE A BRISK WALK)?: 0 DAYS

## 2020-12-28 SDOH — HEALTH STABILITY: PHYSICAL HEALTH: ON AVERAGE, HOW MANY MINUTES DO YOU ENGAGE IN EXERCISE AT THIS LEVEL?: 0 MIN

## 2020-12-28 ASSESSMENT — ENCOUNTER SYMPTOMS
WEIGHT GAIN: 0
COUGH: 0
CHILLS: 0
ALLERGIC/IMMUNOLOGIC COMMENTS: NO NEW FOOD ALLERGIES
HEMOPTYSIS: 0
BRUISES/BLEEDS EASILY: 0
SHORTNESS OF BREATH: 1
HEMATOCHEZIA: 0
FEVER: 0
SUSPICIOUS LESIONS: 0
WEIGHT LOSS: 0

## 2020-12-28 ASSESSMENT — PATIENT HEALTH QUESTIONNAIRE - PHQ9
SUM OF ALL RESPONSES TO PHQ9 QUESTIONS 1 AND 2: 0
1. LITTLE INTEREST OR PLEASURE IN DOING THINGS: NOT AT ALL
CLINICAL INTERPRETATION OF PHQ9 SCORE: NO FURTHER SCREENING NEEDED
SUM OF ALL RESPONSES TO PHQ9 QUESTIONS 1 AND 2: 0
CLINICAL INTERPRETATION OF PHQ2 SCORE: NO FURTHER SCREENING NEEDED
2. FEELING DOWN, DEPRESSED OR HOPELESS: NOT AT ALL

## 2020-12-28 NOTE — DISCHARGE SUMMARY
[facility] HOSPITALIST  DISCHARGE SUMMARY     Uche Ballard Patient Status:  Inpatient    1970 MRN [id number]   [facility] 3NW-A Attending No att. providers found   Hosp Day # 1 PCP Alice Devries MD     Date of Admission: 2020  Brody as: VENTOLIN      Take 3 mL (2.5 mg total) by nebulization every 6 (six) hours as needed for Wheezing.    Quantity: 75 mL  Refills: 0     Albuterol Sulfate  (90 Base) MCG/ACT Aers      Inhale 1-2 puffs into the lungs every 4 (four) hours as needed fo 1 tablet by mouth 2 (two) times daily. Quantity: 180 tablet  Refills: 0     spironolactone 25 MG Tabs  Commonly known as: ALDACTONE      Take 50 mg by mouth 2 (two) times daily.    Refills: 0     torsemide 20 MG Tabs  Commonly known as: DEMADEX      Take

## 2021-02-01 ENCOUNTER — ORDER TRANSCRIPTION (OUTPATIENT)
Dept: SLEEP CENTER | Age: 51
End: 2021-02-01

## 2021-02-01 DIAGNOSIS — Z01.818 PREOP EXAMINATION: Primary | ICD-10-CM

## 2021-02-01 DIAGNOSIS — Z11.59 SCREENING FOR VIRAL DISEASE: ICD-10-CM

## 2021-02-03 ENCOUNTER — LAB ENCOUNTER (OUTPATIENT)
Dept: LAB | Age: 51
End: 2021-02-03
Attending: Other
Payer: COMMERCIAL

## 2021-02-03 DIAGNOSIS — Z01.818 PREOP EXAMINATION: ICD-10-CM

## 2021-02-03 DIAGNOSIS — Z11.59 SCREENING FOR VIRAL DISEASE: ICD-10-CM

## 2021-02-04 LAB — SARS-COV-2 RNA RESP QL NAA+PROBE: NOT DETECTED

## 2021-02-06 ENCOUNTER — OFFICE VISIT (OUTPATIENT)
Dept: SLEEP CENTER | Age: 51
End: 2021-02-06
Attending: Other
Payer: COMMERCIAL

## 2021-02-06 PROCEDURE — 95811 POLYSOM 6/>YRS CPAP 4/> PARM: CPT

## 2021-02-09 NOTE — PROCEDURES
[address] [address],[facility] 100       Accredited by the [facility] (AASM)    PATIENT'S NAME:        Robert Chen  ATTENDING PHYSICIAN:   Chau Cade DO  REFERRING PHYSICIAN:   Xenia Swan DO  PATIENT Sugey Soriano total sleep time, 40.3% was spent in stage N1, 36.7% in stage N2, 0% in stage N3, and 23% in stage R. Arousal index was 60.4. Sleep was sampled in the right lateral position only. A short REM period was observed.   A ResMed AirFit F10 full face large mas

## 2021-03-17 DIAGNOSIS — Z23 NEED FOR VACCINATION: ICD-10-CM

## 2021-03-27 DIAGNOSIS — R10.9 LEFT FLANK PAIN: ICD-10-CM

## 2021-03-29 RX ORDER — SPIRONOLACTONE 25 MG/1
TABLET ORAL
Qty: 60 TABLET | Refills: 5 | Status: SHIPPED | OUTPATIENT
Start: 2021-03-29

## 2021-03-31 RX ORDER — ACETAMINOPHEN AND CODEINE PHOSPHATE 300; 30 MG/1; MG/1
TABLET ORAL
Qty: 28 TABLET | Refills: 0 | Status: SHIPPED | OUTPATIENT
Start: 2021-03-31 | End: 2021-12-23

## 2021-04-02 DIAGNOSIS — I50.23 ACUTE ON CHRONIC SYSTOLIC CONGESTIVE HEART FAILURE (HCC): ICD-10-CM

## 2021-04-02 RX ORDER — ASPIRIN 81 MG/1
TABLET ORAL
Qty: 90 TABLET | Refills: 0 | OUTPATIENT
Start: 2021-04-02

## 2021-04-02 RX ORDER — SACUBITRIL AND VALSARTAN 97; 103 MG/1; MG/1
TABLET, FILM COATED ORAL
Qty: 180 TABLET | Refills: 0 | OUTPATIENT
Start: 2021-04-02

## 2021-04-02 RX ORDER — CARVEDILOL 25 MG/1
TABLET ORAL
Qty: 180 TABLET | Refills: 0 | OUTPATIENT
Start: 2021-04-02

## 2021-04-02 RX ORDER — DIGOXIN 250 MCG
TABLET ORAL
Qty: 90 TABLET | Refills: 0 | OUTPATIENT
Start: 2021-04-02

## 2021-04-02 NOTE — TELEPHONE ENCOUNTER
Doc,    Does pt have a cardiologist for the digoxin and entresto?     Scripts are pended for 90 days upon your approval

## 2021-04-02 NOTE — TELEPHONE ENCOUNTER
Refills of digoxin, entresto and carvedilol should be from cardiology.  Pt sees Dr. Waleska Hilliard for CHF  pls inform patient

## 2021-04-06 ENCOUNTER — TELEPHONE (OUTPATIENT)
Dept: FAMILY MEDICINE CLINIC | Facility: CLINIC | Age: 51
End: 2021-04-06

## 2021-04-06 ENCOUNTER — OFFICE VISIT (OUTPATIENT)
Dept: FAMILY MEDICINE CLINIC | Facility: CLINIC | Age: 51
End: 2021-04-06
Payer: COMMERCIAL

## 2021-04-06 ENCOUNTER — TELEPHONE (OUTPATIENT)
Dept: CARDIOLOGY | Age: 51
End: 2021-04-06

## 2021-04-06 VITALS
RESPIRATION RATE: 18 BRPM | TEMPERATURE: 98 F | DIASTOLIC BLOOD PRESSURE: 78 MMHG | BODY MASS INDEX: 40.43 KG/M2 | HEIGHT: 74 IN | SYSTOLIC BLOOD PRESSURE: 124 MMHG | HEART RATE: 90 BPM | OXYGEN SATURATION: 96 % | WEIGHT: 315 LBS

## 2021-04-06 DIAGNOSIS — M54.10 ACUTE LOW BACK PAIN WITH RADICULAR SYMPTOMS, DURATION LESS THAN 6 WEEKS: Primary | ICD-10-CM

## 2021-04-06 DIAGNOSIS — I50.23 ACUTE ON CHRONIC SYSTOLIC CONGESTIVE HEART FAILURE (HCC): ICD-10-CM

## 2021-04-06 DIAGNOSIS — E11.65 UNCONTROLLED TYPE 2 DIABETES MELLITUS WITH HYPERGLYCEMIA (HCC): ICD-10-CM

## 2021-04-06 PROCEDURE — 3008F BODY MASS INDEX DOCD: CPT | Performed by: NURSE PRACTITIONER

## 2021-04-06 PROCEDURE — 3078F DIAST BP <80 MM HG: CPT | Performed by: NURSE PRACTITIONER

## 2021-04-06 PROCEDURE — 99214 OFFICE O/P EST MOD 30 MIN: CPT | Performed by: NURSE PRACTITIONER

## 2021-04-06 PROCEDURE — 3074F SYST BP LT 130 MM HG: CPT | Performed by: NURSE PRACTITIONER

## 2021-04-06 RX ORDER — CYCLOBENZAPRINE HCL 5 MG
5 TABLET ORAL 3 TIMES DAILY PRN
Qty: 30 TABLET | Refills: 0 | Status: SHIPPED | OUTPATIENT
Start: 2021-04-06 | End: 2021-04-28

## 2021-04-06 RX ORDER — METHOCARBAMOL 750 MG/1
750 TABLET, FILM COATED ORAL 3 TIMES DAILY PRN
COMMUNITY
Start: 2021-03-27 | End: 2021-04-06 | Stop reason: ALTCHOICE

## 2021-04-06 RX ORDER — IBUPROFEN 800 MG/1
TABLET ORAL
COMMUNITY
Start: 2021-03-27 | End: 2021-06-02

## 2021-04-06 NOTE — PATIENT INSTRUCTIONS
Possible Causes of Low Back or Leg Pain    BIG: The symptoms in your back or leg may be due to pressure on a nerve. This pressure may be caused by a damaged disk or by abnormal bone growth. Either way, you may feel pain, burning, tingling, or numbness.  I directed. Talk to your pediatrician regarding the use of this medicine in children. Special care may be needed. What side effects may I notice from receiving this medicine?   Side effects that you should report to your doctor or health care professional a take double or extra doses. Where should I keep my medicine? Keep out of the reach of children. Store at room temperature between 15 and 30 degrees C (59 and 86 degrees F). Keep container tightly closed.  Throw away any unused medicine after the expirati

## 2021-04-06 NOTE — PROGRESS NOTES
SPINE EVALUATION:   Referring Physician: Dr. Lock Cancer  Diagnosis: Acute low back pain with radicular symptoms, duration less than 6 weeks (M54.10)     Date of Service: 4/6/2021     PATIENT SUMMARY   Olivia Mccray is a 53year old male who presents to therapy so he can return to work and live comfortably without any functional limitations.     The patient's past medical history was reviewed and significant findings include chronic systolic CHF, hypercholesteremia, NICM, HTN, pulmonary HTN, ICD, acute chronic con ambulating longer than 5 minutes, standing longer than 5 minutes, negotiating stairs, and getting in and out of the car. Signs and symptoms are consistent with diagnosis of lumbar radiculopathy.  The patient and PT discussed evaluation findings, pathology, positive  SLR: R; negative L; positive (70 degrees)  Active SLR: R; negative L; positive (4/5)  Long Axis Distraction: R; negative L; positive  Hamstring Core Test: R; positive L; positive  [place]'s Sign: R; positive L; positive  Scour Test: R; negative L; 3 consecutive days to improve function with ADL   · Pt will have decreased paraspinal mm tension to tolerate standing > or = to 30 minutes for work and home activities   · Pt will be able to squat to  light objects around the house with < or = to [place] 4/6/2021  To:7/5/2021

## 2021-04-06 NOTE — TELEPHONE ENCOUNTER
Patient called to let Shereen Lyman know that the first MRI appointment available was on 4/19. Pt will schedule follow up visit after that to go over the results.

## 2021-04-06 NOTE — PROGRESS NOTES
Chief Complaint:   Patient presents with:  Leg Pain: C/o bilateral leg pain/numbness x1 month. Pain rated 6/10. HPI:   This is a 53year old male presenting for evaluation of LBP with n/t to left lower extremity x 1 month.  Started after he fell in the 15        Quit date: 2014        Years since quittin.2      Smokeless tobacco: Never Used    Vaping Use      Vaping Use: Never used    Alcohol use: Not Currently      Comment: social    Drug use: No    Family History:  Family History   Problem Rel 0.5 MG/DOSE,) 2 MG/1.5ML Subcutaneous Solution Pen-injector Inject 0.25 mg into the skin once a week.  3 mL 0   • Insulin Aspart Pen (NOVOLOG FLEXPEN) 100 UNIT/ML Subcutaneous Solution Pen-injector Uses up to 10 units three times daily 15 mL 0   • hydrALAzi SpO2 96%   BMI 46.35 kg/m²  Estimated body mass index is 46.35 kg/m² as calculated from the following:    Height as of this encounter: 6' 2\" (1.88 m). Weight as of this encounter: 361 lb (163.7 kg). Vital signs reviewed. Appears stated age, well rosa symptoms, duration less than 6 weeks  -Meralgia paresthetica?   -Continue T#3.   -Flexeril tid prn. Not to take with T#3 or other medications that may cause drowsiness.  Recommend starting at bedtime.   - MRI SPINE LUMBAR (CPT=72148)  - OP REFERRAL TO DEE

## 2021-04-07 ENCOUNTER — MED REC SCAN ONLY (OUTPATIENT)
Dept: FAMILY MEDICINE CLINIC | Facility: CLINIC | Age: 51
End: 2021-04-07

## 2021-04-07 ENCOUNTER — OFFICE VISIT (OUTPATIENT)
Dept: PHYSICAL THERAPY | Age: 51
End: 2021-04-07
Attending: NURSE PRACTITIONER
Payer: COMMERCIAL

## 2021-04-07 DIAGNOSIS — M54.10 ACUTE LOW BACK PAIN WITH RADICULAR SYMPTOMS, DURATION LESS THAN 6 WEEKS: ICD-10-CM

## 2021-04-07 PROCEDURE — 97162 PT EVAL MOD COMPLEX 30 MIN: CPT

## 2021-04-07 PROCEDURE — 97110 THERAPEUTIC EXERCISES: CPT

## 2021-04-07 RX ORDER — DIGOXIN 250 MCG
TABLET ORAL
Qty: 90 TABLET | Refills: 1 | Status: SHIPPED | OUTPATIENT
Start: 2021-04-07

## 2021-04-07 RX ORDER — SACUBITRIL AND VALSARTAN 97; 103 MG/1; MG/1
TABLET, FILM COATED ORAL
Qty: 180 TABLET | Refills: 1 | Status: SHIPPED | OUTPATIENT
Start: 2021-04-07

## 2021-04-09 RX ORDER — CARVEDILOL 25 MG/1
TABLET ORAL
Qty: 180 TABLET | Refills: 0 | Status: SHIPPED | OUTPATIENT
Start: 2021-04-09

## 2021-04-09 RX ORDER — HYDRALAZINE HYDROCHLORIDE 50 MG/1
50 TABLET, FILM COATED ORAL 3 TIMES DAILY
Qty: 270 TABLET | Refills: 0 | Status: SHIPPED | OUTPATIENT
Start: 2021-04-09

## 2021-04-09 RX ORDER — ISOSORBIDE DINITRATE 10 MG/1
10 TABLET ORAL 3 TIMES DAILY
Qty: 270 TABLET | Refills: 0 | Status: SHIPPED | OUTPATIENT
Start: 2021-04-09

## 2021-04-12 ENCOUNTER — PATIENT MESSAGE (OUTPATIENT)
Dept: FAMILY MEDICINE CLINIC | Facility: CLINIC | Age: 51
End: 2021-04-12

## 2021-04-12 NOTE — TELEPHONE ENCOUNTER
Evon Barrios for letter to remain off work until MRI on 04/19. Can schedule OV for FMLA if needed. Recommend he continue with PT and supportive measures discussed at [address] F/u after MRI.

## 2021-04-12 NOTE — TELEPHONE ENCOUNTER
From: Kathleen Almaguer  To: MITCHELL Short, FNP-C  Sent: 4/12/2021 8:17 AM CDT  Subject: Other    Good morning Chele Claudio,    I have a question for you regarding returning to work on tomorrow.  I am still in pain like I was last week and have only had one therapy s

## 2021-04-13 ENCOUNTER — OFFICE VISIT (OUTPATIENT)
Dept: PHYSICAL THERAPY | Age: 51
End: 2021-04-13
Attending: NURSE PRACTITIONER
Payer: COMMERCIAL

## 2021-04-13 PROCEDURE — 97110 THERAPEUTIC EXERCISES: CPT

## 2021-04-13 PROCEDURE — 97140 MANUAL THERAPY 1/> REGIONS: CPT

## 2021-04-13 PROCEDURE — 97112 NEUROMUSCULAR REEDUCATION: CPT

## 2021-04-13 NOTE — PROGRESS NOTES
Dx: Acute low back pain with radicular symptoms, duration less than 6 weeks (M54.10)    Insurance (Authorized # of Visits):  6           Authorizing Physician: Dr. Dipak Wilder  Next MD visit: none scheduled  Fall Risk: standard         Precautions: Pacemaker improve lumbar spine AROM flexion to > or = to 90 deg to allow increase ease with bending forward to don shoes   · Pt will report improved symptom centralization and absence of radicular symptoms for 3 consecutive days to improve function with ADL   · Pt w

## 2021-04-14 ENCOUNTER — TELEPHONE (OUTPATIENT)
Dept: PHYSICAL THERAPY | Facility: HOSPITAL | Age: 51
End: 2021-04-14

## 2021-04-15 ENCOUNTER — APPOINTMENT (OUTPATIENT)
Dept: PHYSICAL THERAPY | Age: 51
End: 2021-04-15
Attending: NURSE PRACTITIONER
Payer: COMMERCIAL

## 2021-04-19 ENCOUNTER — PATIENT MESSAGE (OUTPATIENT)
Dept: FAMILY MEDICINE CLINIC | Facility: CLINIC | Age: 51
End: 2021-04-19

## 2021-04-19 NOTE — TELEPHONE ENCOUNTER
From: Hannah Herbert  To: MITCHELL Mclaughlin FNP-C  Sent: 4/19/2021 9:22 AM CDT  Subject: Other    Good morning Daryn Atkinson,    I know that you are busy but I just wanted to follow-up on the email I sent on Friday.      I am still in a lot of pain but have a return t

## 2021-04-19 NOTE — TELEPHONE ENCOUNTER
Patient states that he is still experiencing pain. Requesting a note to be off of work. Does not feel it is necessary to schedule F/U. Scheduled for MRI tomorrow. Please advise. Thank you.

## 2021-04-19 NOTE — TELEPHONE ENCOUNTER
[id number] Jenelle Perez for note to excuse from work until he completes MRI. Will need to f/u based on MRI results.

## 2021-04-19 NOTE — TELEPHONE ENCOUNTER
Patient emailed on Friday and again today. He is still in a lot of pain and has not heard back from anyone today. He is looking for a note to miss work for the upcoming days.      I explained that it will be 2 weeks that we have seen him and I asked him to

## 2021-04-20 ENCOUNTER — HOSPITAL ENCOUNTER (OUTPATIENT)
Dept: MRI IMAGING | Facility: HOSPITAL | Age: 51
Discharge: HOME | End: 2021-04-20
Attending: NURSE PRACTITIONER
Payer: COMMERCIAL

## 2021-04-20 ENCOUNTER — TELEPHONE (OUTPATIENT)
Dept: FAMILY MEDICINE CLINIC | Facility: CLINIC | Age: 51
End: 2021-04-20

## 2021-04-20 VITALS — SYSTOLIC BLOOD PRESSURE: 160 MMHG | HEART RATE: 85 BPM | DIASTOLIC BLOOD PRESSURE: 97 MMHG | OXYGEN SATURATION: 90 %

## 2021-04-20 DIAGNOSIS — M54.10 ACUTE LOW BACK PAIN WITH RADICULAR SYMPTOMS, DURATION LESS THAN 6 WEEKS: ICD-10-CM

## 2021-04-20 DIAGNOSIS — M54.10 ACUTE LOW BACK PAIN WITH RADICULAR SYMPTOMS, DURATION LESS THAN 6 WEEKS: Primary | ICD-10-CM

## 2021-04-20 PROCEDURE — 72148 MRI LUMBAR SPINE W/O DYE: CPT | Performed by: NURSE PRACTITIONER

## 2021-04-20 NOTE — IMAGING NOTE
Patient received in MRI holding. AICD and pacemaker turned off for MRI scan by [employer] [id number]. Patient closely monitored during MRI scan. Defibrilator/crash cart outside MRI room as per protocol. Patient tolerated scan well.  Post scan PM/AICD retu

## 2021-04-20 NOTE — TELEPHONE ENCOUNTER
Pt states he got his mri results and he has herniated disk. Wants to know if he needs to come in to see troy or what the next steps are.

## 2021-04-21 ENCOUNTER — TELEPHONE (OUTPATIENT)
Dept: PHYSICAL THERAPY | Age: 51
End: 2021-04-21

## 2021-04-21 ENCOUNTER — APPOINTMENT (OUTPATIENT)
Dept: PHYSICAL THERAPY | Age: 51
End: 2021-04-21
Attending: NURSE PRACTITIONER
Payer: COMMERCIAL

## 2021-04-21 ENCOUNTER — OFFICE VISIT (OUTPATIENT)
Dept: SURGERY | Facility: CLINIC | Age: 51
End: 2021-04-21
Payer: COMMERCIAL

## 2021-04-21 VITALS
DIASTOLIC BLOOD PRESSURE: 100 MMHG | HEIGHT: 74 IN | SYSTOLIC BLOOD PRESSURE: 140 MMHG | WEIGHT: 315 LBS | BODY MASS INDEX: 40.43 KG/M2

## 2021-04-21 DIAGNOSIS — M54.16 LUMBAR RADICULOPATHY: Primary | ICD-10-CM

## 2021-04-21 PROCEDURE — 3077F SYST BP >= 140 MM HG: CPT | Performed by: NEUROLOGICAL SURGERY

## 2021-04-21 PROCEDURE — 3008F BODY MASS INDEX DOCD: CPT | Performed by: NEUROLOGICAL SURGERY

## 2021-04-21 PROCEDURE — 3080F DIAST BP >= 90 MM HG: CPT | Performed by: NEUROLOGICAL SURGERY

## 2021-04-21 PROCEDURE — 99202 OFFICE O/P NEW SF 15 MIN: CPT | Performed by: NEUROLOGICAL SURGERY

## 2021-04-21 RX ORDER — DICLOFENAC SODIUM 75 MG/1
75 TABLET, DELAYED RELEASE ORAL 2 TIMES DAILY
Qty: 60 TABLET | Refills: 1 | Status: SHIPPED | OUTPATIENT
Start: 2021-04-21

## 2021-04-21 NOTE — H&P
[facility]  Neurosurgery Consult    REASON FOR CONSULTATION:  Left leg pain    HISTORY OF PRESENT ILLNESS:  Lori Anne is a(n) 49year old male with a 3-4 month history of L>R leg pain.   He works in the [employer] at [employer], has noted that reviewed. Pertinent positives and negatives are noted in HPI. PHYSICAL EXAMINATION:  VITAL SIGNS: There were no vitals taken for this visit. GENERAL:  Patient is a 49year old male in no acute distress.   NEUROLOGICAL:     Cognition: alert and orient

## 2021-04-21 NOTE — TELEPHONE ENCOUNTER
He should be evaluated by neurosurgery. [id number] Jenelle Perez for referral to Dr. Sukumar Hwang or anyone within that group.

## 2021-04-21 NOTE — PROGRESS NOTES
2019 did slip on some ice and fell  Right foot slipped out from underneath, felt something pop, landed on right side and tailbone.     Now has flared up this December 2020  Pain lower back area, and burning in legs, and sometimes legs would cramp up, and

## 2021-04-23 ENCOUNTER — OFFICE VISIT (OUTPATIENT)
Dept: PHYSICAL THERAPY | Age: 51
End: 2021-04-23
Attending: NURSE PRACTITIONER
Payer: COMMERCIAL

## 2021-04-23 PROCEDURE — 97112 NEUROMUSCULAR REEDUCATION: CPT

## 2021-04-23 PROCEDURE — 97140 MANUAL THERAPY 1/> REGIONS: CPT

## 2021-04-23 PROCEDURE — 97110 THERAPEUTIC EXERCISES: CPT

## 2021-04-26 NOTE — PROGRESS NOTES
Dx: Acute low back pain with radicular symptoms, duration less than 6 weeks (M54.10)    Insurance (Authorized # of Visits):  8           Authorizing Physician: Evie Osler MD visit: none scheduled  Fall Risk: standard         Precautions: Pacema functional goals.        Goals:   · Pt will improve transversus abdominis recruitment to perform proper isometric contraction without requiring verbal or tactile cuing to promote advancement of therex   · Pt will demonstrate good understanding of proper po sets         Prone hip distraction - 5 mins --         LLE long axis distraction - 6 mins LLE long axis distraction - 6 mins         Pelvic tilts, x 10 reps, 2 sets  Pelvic tilts, x 10 reps, 2 sets         Prone push ups w/ 3 sec holds, x 10 reps, 2 sets

## 2021-04-27 ENCOUNTER — APPOINTMENT (OUTPATIENT)
Dept: PHYSICAL THERAPY | Age: 51
End: 2021-04-27
Attending: NURSE PRACTITIONER
Payer: COMMERCIAL

## 2021-04-27 ENCOUNTER — TELEPHONE (OUTPATIENT)
Dept: PHYSICAL THERAPY | Age: 51
End: 2021-04-27

## 2021-04-27 ENCOUNTER — TELEPHONE (OUTPATIENT)
Dept: FAMILY MEDICINE CLINIC | Facility: CLINIC | Age: 51
End: 2021-04-27

## 2021-04-27 DIAGNOSIS — M54.10 ACUTE LOW BACK PAIN WITH RADICULAR SYMPTOMS, DURATION LESS THAN 6 WEEKS: ICD-10-CM

## 2021-04-27 NOTE — TELEPHONE ENCOUNTER
Pt called, he spoke with his PT today and the therapist advised Pt to call PCP. Pt's left leg has been very painful, throbbing at night, it was swollen but the swelling went down. Pt is taking pain pills but does not want to rely on them.  Pt is looking for

## 2021-04-27 NOTE — TELEPHONE ENCOUNTER
Called pt and he states on Friday while at PT he felt his left leg \"buckled\" while doing exercises. Per pt, he was using a \"stretch band\" when he felt the pain. Pt states everyday since then he has pain. Denies swelling or bruising.  Supportive measures

## 2021-04-28 PROCEDURE — 93296 REM INTERROG EVL PM/IDS: CPT | Performed by: INTERNAL MEDICINE

## 2021-04-28 PROCEDURE — 93295 DEV INTERROG REMOTE 1/2/MLT: CPT | Performed by: INTERNAL MEDICINE

## 2021-04-28 RX ORDER — CYCLOBENZAPRINE HCL 5 MG
5 TABLET ORAL 3 TIMES DAILY PRN
Qty: 30 TABLET | Refills: 0 | Status: SHIPPED | OUTPATIENT
Start: 2021-04-28 | End: 2021-04-30 | Stop reason: DRUGHIGH

## 2021-04-29 ENCOUNTER — ANCILLARY ORDERS (OUTPATIENT)
Dept: CARDIOLOGY | Age: 51
End: 2021-04-29

## 2021-04-29 ENCOUNTER — APPOINTMENT (OUTPATIENT)
Dept: PHYSICAL THERAPY | Age: 51
End: 2021-04-29
Attending: NURSE PRACTITIONER
Payer: COMMERCIAL

## 2021-04-29 ENCOUNTER — ANCILLARY PROCEDURE (OUTPATIENT)
Dept: CARDIOLOGY | Age: 51
End: 2021-04-29
Attending: INTERNAL MEDICINE

## 2021-04-29 DIAGNOSIS — Z45.02 ENCOUNTER FOR ASSESSMENT OF IMPLANTABLE CARDIOVERTER-DEFIBRILLATOR (ICD): ICD-10-CM

## 2021-04-29 PROCEDURE — X1114 CARDIAC DEVICE HOME CHECK - REMOTE UNSCHEDULED: HCPCS | Performed by: INTERNAL MEDICINE

## 2021-04-30 ENCOUNTER — OFFICE VISIT (OUTPATIENT)
Dept: PAIN CLINIC | Facility: CLINIC | Age: 51
End: 2021-04-30
Payer: COMMERCIAL

## 2021-04-30 VITALS
HEIGHT: 74 IN | SYSTOLIC BLOOD PRESSURE: 160 MMHG | DIASTOLIC BLOOD PRESSURE: 110 MMHG | BODY MASS INDEX: 40.43 KG/M2 | HEART RATE: 95 BPM | WEIGHT: 315 LBS

## 2021-04-30 DIAGNOSIS — M47.816 FACET ARTHROPATHY, LUMBAR: ICD-10-CM

## 2021-04-30 DIAGNOSIS — M54.16 LUMBAR RADICULITIS: Primary | ICD-10-CM

## 2021-04-30 PROCEDURE — 3077F SYST BP >= 140 MM HG: CPT | Performed by: NURSE PRACTITIONER

## 2021-04-30 PROCEDURE — 3008F BODY MASS INDEX DOCD: CPT | Performed by: NURSE PRACTITIONER

## 2021-04-30 PROCEDURE — 3080F DIAST BP >= 90 MM HG: CPT | Performed by: NURSE PRACTITIONER

## 2021-04-30 PROCEDURE — 99213 OFFICE O/P EST LOW 20 MIN: CPT | Performed by: NURSE PRACTITIONER

## 2021-04-30 RX ORDER — GABAPENTIN 300 MG/1
300 CAPSULE ORAL 3 TIMES DAILY
Qty: 90 CAPSULE | Refills: 0 | Status: SHIPPED | OUTPATIENT
Start: 2021-04-30 | End: 2021-05-26

## 2021-04-30 RX ORDER — CYCLOBENZAPRINE HCL 10 MG
10 TABLET ORAL 3 TIMES DAILY PRN
Qty: 90 TABLET | Refills: 0 | Status: SHIPPED | OUTPATIENT
Start: 2021-04-30

## 2021-04-30 NOTE — PROGRESS NOTES
Name: Yaima Chacon   : 1970   DOS: 2021     Chief complaint: Patient presents with:  New Patient      History of present illness:  Yaima Chacon is a 49year old male complaining of  Low back pain radiating down the left leg.  The pain started in Pen Needle (BD PEN NEEDLE LIEN U/F) 32G X 4 MM Does not apply Misc Use a new pen needle with each injection as directed by your doctor 100 each 6   • carvedilol 25 MG Oral Tab Take 25 mg by mouth 2 (two) times daily with meals.      • Rosuvastatin Calcium 2 DEFIBRILLATOR (ICD)     • CYST REMOVAL Bilateral 1979   • THROAT SURGERY PROCEDURE UNLISTED      cyst in throat      Family History   Problem Relation Age of Onset   • Heart Disorder Mother    • Diabetes Mother    • Heart Disorder Maternal Grandmother    • 74\"   Wt (!) 361 lb (163.7 kg)   BMI 46.35 kg/m²    The patient is awake, alert, oriented and corporative. Pt has a normal affect. The patient ambulates with normal gait. HEENT: No gross lesion noted. PEERL. No icterus.   Examination of the lower back:  + level into the sacrum.           Dictated by (CST): Dom Gates MD on 4/20/2021 at 2:55 PM       Finalized by (CST): Dom Gates MD on 4/20/2021 at 3:02 PM          Assessment:  Lumbar radiculitis  (primary encounter diagnosis)  Facet arthrop

## 2021-04-30 NOTE — PROGRESS NOTES
HPI/Subjective:   Patient ID: Mao Acevedo is a 49year old male.     HPI    History/Other:   Review of Systems  Current Outpatient Medications   Medication Sig Dispense Refill   • cyclobenzaprine 5 MG Oral Tab Take 1 tablet (5 mg total) by mouth 3 (three) t get back to regular dosing of 100 mg three times daily     • isosorbide dinitrate 20 MG Oral Tab Take 0.5 tablets (10 mg total) by mouth TID (Nitrates). Goal to get back to regular dosing of 20 mg three times daily.      • insulin detemir (Xavi Reyna

## 2021-05-03 ENCOUNTER — TELEPHONE (OUTPATIENT)
Dept: NEUROLOGY | Facility: CLINIC | Age: 51
End: 2021-05-03

## 2021-05-03 DIAGNOSIS — M54.16 LUMBAR RADICULOPATHY: Primary | ICD-10-CM

## 2021-05-03 NOTE — TELEPHONE ENCOUNTER
Patient advised of insurance approval to proceed with injections and is agreeable to scheduling. Patient scheduled for procedure, pre-procedure instructions reviewed. Patient prefers local sedation.  Reviewed sedation instructions including no need to be fa you need to be off for the following medications:  • Aggrenox 10 days   • Agrylin (Anagrelide) 10 days  • Brilinta (Ticagrelor) 7 days  • Imbruvica (Ibrutinib) 3 days   • Enbrel (Etanercept) 24 hours   • Fragmin (Dalteparin) 24 hours   • Pletal (Cilostazol after your procedure at  [phone]. If you are a diabetic, please increase the frequency of your glucose monitoring after the procedure as this may cause a temporary increase in your blood sugar.   Contact your primary care physician if your blood sugar

## 2021-05-03 NOTE — TELEPHONE ENCOUNTER
Question Answer Comment   Anesthesia Type Local    Provider [facility]    Procedure Transforaminal    Laterality/Level Left L3-4    Medical clearance requested (will send to Pain Navigator) No    Patient has Medicare coverage?  No           Associ

## 2021-05-03 NOTE — TELEPHONE ENCOUNTER
Prior authorization request completed for: Left L3-L4 TLESI   Authorization #[id number]    Authorization dates: 05/03/21 - 05/14/21  CPT codes approved: 68503,57751  Number of visits/dates of service approved: 1  Physician: [facility]  Location: Rhett Oro

## 2021-05-04 ENCOUNTER — APPOINTMENT (OUTPATIENT)
Dept: PHYSICAL THERAPY | Age: 51
End: 2021-05-04
Attending: NURSE PRACTITIONER
Payer: COMMERCIAL

## 2021-05-06 ENCOUNTER — HOSPITAL ENCOUNTER (OUTPATIENT)
Facility: HOSPITAL | Age: 51
Setting detail: HOSPITAL OUTPATIENT SURGERY
Discharge: HOME | End: 2021-05-06
Attending: ANESTHESIOLOGY | Admitting: ANESTHESIOLOGY
Payer: COMMERCIAL

## 2021-05-06 ENCOUNTER — APPOINTMENT (OUTPATIENT)
Dept: GENERAL RADIOLOGY | Facility: HOSPITAL | Age: 51
End: 2021-05-06
Attending: ANESTHESIOLOGY
Payer: COMMERCIAL

## 2021-05-06 ENCOUNTER — TELEPHONE (OUTPATIENT)
Dept: CARDIOLOGY | Age: 51
End: 2021-05-06

## 2021-05-06 ENCOUNTER — APPOINTMENT (OUTPATIENT)
Dept: PHYSICAL THERAPY | Age: 51
End: 2021-05-06
Attending: NURSE PRACTITIONER
Payer: COMMERCIAL

## 2021-05-06 VITALS
RESPIRATION RATE: 18 BRPM | TEMPERATURE: 97 F | SYSTOLIC BLOOD PRESSURE: 132 MMHG | DIASTOLIC BLOOD PRESSURE: 95 MMHG | HEART RATE: 86 BPM | OXYGEN SATURATION: 95 %

## 2021-05-06 DIAGNOSIS — M54.16 LUMBAR RADICULOPATHY: ICD-10-CM

## 2021-05-06 PROCEDURE — 3E0R33Z INTRODUCTION OF ANTI-INFLAMMATORY INTO SPINAL CANAL, PERCUTANEOUS APPROACH: ICD-10-PCS | Performed by: ANESTHESIOLOGY

## 2021-05-06 PROCEDURE — 82962 GLUCOSE BLOOD TEST: CPT

## 2021-05-06 RX ORDER — INSULIN ASPART 100 [IU]/ML
3 INJECTION, SOLUTION INTRAVENOUS; SUBCUTANEOUS ONCE
Status: DISCONTINUED | OUTPATIENT
Start: 2021-05-06 | End: 2021-05-06

## 2021-05-06 RX ORDER — DEXAMETHASONE SODIUM PHOSPHATE 10 MG/ML
INJECTION, SOLUTION INTRAMUSCULAR; INTRAVENOUS
Status: DISCONTINUED | OUTPATIENT
Start: 2021-05-06 | End: 2021-05-06

## 2021-05-06 RX ORDER — LIDOCAINE HYDROCHLORIDE 10 MG/ML
INJECTION, SOLUTION EPIDURAL; INFILTRATION; INTRACAUDAL; PERINEURAL
Status: DISCONTINUED | OUTPATIENT
Start: 2021-05-06 | End: 2021-05-06

## 2021-05-06 RX ORDER — DIPHENHYDRAMINE HYDROCHLORIDE 50 MG/ML
50 INJECTION INTRAMUSCULAR; INTRAVENOUS ONCE AS NEEDED
Status: DISCONTINUED | OUTPATIENT
Start: 2021-05-06 | End: 2021-05-06

## 2021-05-06 RX ORDER — SODIUM CHLORIDE 9 MG/ML
INJECTION INTRAVENOUS
Status: DISCONTINUED | OUTPATIENT
Start: 2021-05-06 | End: 2021-05-06

## 2021-05-06 RX ORDER — DEXTROSE MONOHYDRATE 25 G/50ML
50 INJECTION, SOLUTION INTRAVENOUS
Status: DISCONTINUED | OUTPATIENT
Start: 2021-05-06 | End: 2021-05-06

## 2021-05-06 RX ORDER — ONDANSETRON 2 MG/ML
4 INJECTION INTRAMUSCULAR; INTRAVENOUS ONCE AS NEEDED
Status: DISCONTINUED | OUTPATIENT
Start: 2021-05-06 | End: 2021-05-06

## 2021-05-06 RX ORDER — TORSEMIDE 20 MG/1
40 TABLET ORAL 2 TIMES DAILY
Qty: 120 TABLET | Refills: 0 | Status: SHIPPED | OUTPATIENT
Start: 2021-05-06

## 2021-05-06 NOTE — OPERATIVE REPORT
[facility]  Operative Report  2021     Napoleon Stanton Patient Status:  Hospital Outpatient Surgery    1970 MRN [id number]   Location [address] [place] [address] Attending Keyshawn Borges MD   Hosp Day # 0 PCP Lilli Soto MD 10 mg of dexamethasone was injected without complication. The needle was withdrawn with stylet in situ. The patient tolerated procedure very well. The patient was observed until discharge criteria met.   Discharge instructions were given and patient was r

## 2021-05-06 NOTE — H&P
History & Physical Examination    Patient Name: Ella Soto  MRN: [id number]  CSN: [id number]  YOB: 1970    Pre-Operative Diagnosis:  Lumbar radiculopathy [M54.16]    Present Illness: Patient with lumbar radiculopathy for transforaminal epidur MG/1.5ML Subcutaneous Solution Pen-injector, Inject 0.25 mg into the skin once a week.  (Patient not taking: Reported on 4/30/2021 ), Disp: 3 mL, Rfl: 0  Insulin Aspart Pen (NOVOLOG FLEXPEN) 100 UNIT/ML Subcutaneous Solution Pen-injector, Uses up to 10 unit CATH IMPLANTABLE CARDIAC DEFIBRILLATOR (ICD)     • CYST REMOVAL Bilateral 1979   • THROAT SURGERY PROCEDURE UNLISTED      cyst in throat     Family History   Problem Relation Age of Onset   • Heart Disorder Mother    • Diabetes Mother    • Heart Disorder M

## 2021-05-12 ENCOUNTER — TELEPHONE (OUTPATIENT)
Dept: SURGERY | Facility: CLINIC | Age: 51
End: 2021-05-12

## 2021-05-12 DIAGNOSIS — M54.16 LUMBAR RADICULOPATHY: Primary | ICD-10-CM

## 2021-05-12 NOTE — TELEPHONE ENCOUNTER
Spoke to who states pain in returning. Pt states pain is in lower extremities (under knee cap, in thigh, and in ankle). Throbbing, sharp, radiating pain, interrupting sleep.  Reviewed OV note which indicate that pt was experiencing left LE sx during OV on 4

## 2021-05-12 NOTE — TELEPHONE ENCOUNTER
Pt states he is still experiencing pain in his legs even after having his recent procedure.  He was told it could take about 7 days to start seeing a difference but he just wanted to have it documented that he is still feeling pain and to see if there is an

## 2021-05-12 NOTE — TELEPHONE ENCOUNTER
Fabiano Curry MD  You 1 hour ago (11:45 AM)     I can repeat his left sided transforaminal if his symptoms are not changed

## 2021-05-12 NOTE — TELEPHONE ENCOUNTER
S:  Returned pt's call     B: Patient was seen by Dr. Kaitlyn Snow on 4/21/21 for Lumbar radiculopathy.   He was referred to pain management for [facility]    Per LOV notes from Dr. Kaitlyn Snow:  Travis Loo:  -trial of diclofenac, flexeril  -continue PT  -referral to Pain [facility]

## 2021-05-18 ENCOUNTER — TELEPHONE (OUTPATIENT)
Dept: SURGERY | Facility: CLINIC | Age: 51
End: 2021-05-18

## 2021-05-18 DIAGNOSIS — M54.16 LUMBAR RADICULOPATHY: Primary | ICD-10-CM

## 2021-05-18 NOTE — TELEPHONE ENCOUNTER
Started PA via [facility]IM for Left L3 and L4 TLESI ([phone]) , clinical information faxed to [facility] at [address] received.

## 2021-05-18 NOTE — TELEPHONE ENCOUNTER
Per TE dated 5/12/2021:    \"Pt states he obtained approx 40% relief. Pt states he was able to walk very well during period of relief. Pt states he has not been able to reduce any medications. \"

## 2021-05-18 NOTE — TELEPHONE ENCOUNTER
Please contact patient for follow up to 05/06 Left TLESI  procedure:     Please document % relief from procedure, change in functional status, and/or change in medications since procedure.

## 2021-05-18 NOTE — TELEPHONE ENCOUNTER
Patient was under the impression that he was going to have another injection this week or next week. Patient would like a call regarding injections and if he is supposed to be getting another one or not.

## 2021-05-20 ENCOUNTER — ANCILLARY PROCEDURE (OUTPATIENT)
Dept: CARDIOLOGY | Age: 51
End: 2021-05-20
Attending: INTERNAL MEDICINE

## 2021-05-20 ENCOUNTER — TELEPHONE (OUTPATIENT)
Dept: CARDIOLOGY | Age: 51
End: 2021-05-20

## 2021-05-20 ENCOUNTER — APPOINTMENT (OUTPATIENT)
Dept: CARDIOLOGY | Age: 51
End: 2021-05-20

## 2021-05-20 DIAGNOSIS — Z95.810 ICD (IMPLANTABLE CARDIOVERTER-DEFIBRILLATOR) IN PLACE: ICD-10-CM

## 2021-05-20 RX ORDER — DICLOFENAC SODIUM 75 MG/1
75 TABLET, DELAYED RELEASE ORAL 2 TIMES DAILY
COMMUNITY
Start: 2021-04-21

## 2021-05-20 NOTE — TELEPHONE ENCOUNTER
Patient called requesting an update on injection. Advise patient PA has been initiated and we are currently waiting on approval.    Fely Dove verbalized understanding, no further questions or concerns at this time.

## 2021-05-23 DIAGNOSIS — R10.9 LEFT FLANK PAIN: ICD-10-CM

## 2021-05-24 ENCOUNTER — PATIENT MESSAGE (OUTPATIENT)
Dept: PAIN CLINIC | Facility: CLINIC | Age: 51
End: 2021-05-24

## 2021-05-24 NOTE — TELEPHONE ENCOUNTER
From: Olivia Mccray  To: Jhony Waddell MD  Sent: 5/24/2021 4:20 PM CDT  Subject: Other    Any word on second injection from insurance. Still experiencing pain.

## 2021-05-25 DIAGNOSIS — M47.816 FACET ARTHROPATHY, LUMBAR: ICD-10-CM

## 2021-05-25 DIAGNOSIS — M54.16 LUMBAR RADICULITIS: Primary | ICD-10-CM

## 2021-05-25 RX ORDER — ACETAMINOPHEN AND CODEINE PHOSPHATE 300; 30 MG/1; MG/1
TABLET ORAL
Qty: 28 TABLET | Refills: 0 | OUTPATIENT
Start: 2021-05-25

## 2021-05-26 RX ORDER — GABAPENTIN 300 MG/1
CAPSULE ORAL
Qty: 90 CAPSULE | Refills: 0 | Status: SHIPPED | OUTPATIENT
Start: 2021-05-26

## 2021-05-26 NOTE — TELEPHONE ENCOUNTER
Patient called requesting to speak to RN Ariadne Jason in regards to PA. Patient states he spoke to INS and was advised they have not received anything in regards to PA.      Advised patient that the PA department we reach is not the same one patients have acces

## 2021-05-26 NOTE — TELEPHONE ENCOUNTER
LOV: 10/2020  Future Appointments   Date Time Provider Sandrine Chaudhari   6/2/2021 10:30 AM MD MELVIN Sunshine[facility]     No appt scheduled.

## 2021-05-26 NOTE — TELEPHONE ENCOUNTER
Medication: gabapentin 300 MG Oral Cap    Date of last refill: 4/30/21      Last office visit: 4/30/21  Due back to clinic per last office note:  2-4 weeks after procedure  Date next office visit scheduled:  none      Last OV note recommendation: Milind Moser

## 2021-05-27 NOTE — TELEPHONE ENCOUNTER
Question Answer Comment   Anesthesia Type Local    Provider [facility]    Procedure Transforaminal    Laterality/Level left L3-4, l4-5    Implants No    Medical clearance requested (will send to Pain Navigator) No    Patient has Medicare coverage

## 2021-05-27 NOTE — TELEPHONE ENCOUNTER
Patient advised of insurance approval to proceed with injections and is agreeable to scheduling. Patient scheduled for procedure, pre-procedure instructions reviewed. Patient prefers local sedation.  Reviewed sedation instructions including no need to be fa

## 2021-05-27 NOTE — TELEPHONE ENCOUNTER
Called AIM for status: Left L3 and L4 TLESI ([phone]) spoke to nurse reviewer,  Yoel Poster . Request above is authorized.  Authorization # [id number] valid 6/01/21 thru 6/14/21     Prior authorization request completed for: Left L3 and L4 TLESI    Authorizati

## 2021-06-01 ENCOUNTER — HOSPITAL ENCOUNTER (OUTPATIENT)
Facility: HOSPITAL | Age: 51
Setting detail: HOSPITAL OUTPATIENT SURGERY
Discharge: HOME | End: 2021-06-01
Attending: ANESTHESIOLOGY | Admitting: ANESTHESIOLOGY
Payer: COMMERCIAL

## 2021-06-01 ENCOUNTER — APPOINTMENT (OUTPATIENT)
Dept: GENERAL RADIOLOGY | Facility: HOSPITAL | Age: 51
End: 2021-06-01
Attending: ANESTHESIOLOGY
Payer: COMMERCIAL

## 2021-06-01 VITALS
OXYGEN SATURATION: 92 % | HEART RATE: 84 BPM | DIASTOLIC BLOOD PRESSURE: 69 MMHG | RESPIRATION RATE: 20 BRPM | SYSTOLIC BLOOD PRESSURE: 116 MMHG | TEMPERATURE: 97 F

## 2021-06-01 DIAGNOSIS — M54.16 LUMBAR RADICULOPATHY: ICD-10-CM

## 2021-06-01 PROCEDURE — 3E0R33Z INTRODUCTION OF ANTI-INFLAMMATORY INTO SPINAL CANAL, PERCUTANEOUS APPROACH: ICD-10-PCS | Performed by: ANESTHESIOLOGY

## 2021-06-01 PROCEDURE — 82962 GLUCOSE BLOOD TEST: CPT

## 2021-06-01 RX ORDER — DEXAMETHASONE SODIUM PHOSPHATE 10 MG/ML
INJECTION, SOLUTION INTRAMUSCULAR; INTRAVENOUS
Status: DISCONTINUED | OUTPATIENT
Start: 2021-06-01 | End: 2021-06-01

## 2021-06-01 RX ORDER — SODIUM CHLORIDE 9 MG/ML
INJECTION INTRAVENOUS
Status: DISCONTINUED | OUTPATIENT
Start: 2021-06-01 | End: 2021-06-01

## 2021-06-01 RX ORDER — DIPHENHYDRAMINE HYDROCHLORIDE 50 MG/ML
50 INJECTION INTRAMUSCULAR; INTRAVENOUS ONCE AS NEEDED
Status: DISCONTINUED | OUTPATIENT
Start: 2021-06-01 | End: 2021-06-01

## 2021-06-01 RX ORDER — LIDOCAINE HYDROCHLORIDE 10 MG/ML
INJECTION, SOLUTION EPIDURAL; INFILTRATION; INTRACAUDAL; PERINEURAL
Status: DISCONTINUED | OUTPATIENT
Start: 2021-06-01 | End: 2021-06-01

## 2021-06-01 RX ORDER — ONDANSETRON 2 MG/ML
4 INJECTION INTRAMUSCULAR; INTRAVENOUS ONCE AS NEEDED
Status: DISCONTINUED | OUTPATIENT
Start: 2021-06-01 | End: 2021-06-01

## 2021-06-01 NOTE — OPERATIVE REPORT
[facility]  Operative Report  2021     Lori Certain Patient Status:  Hospital Outpatient Surgery    1970 MRN [id number]   Location [id number] [place] [address] Attending João Hua MD   Hosp Day # 0 PCP Sanket Lugo MD root was obtained. At this point, 2 cc of normal saline with 10 mg of dexamethasone was injected without complication. The needle was withdrawn with stylet in situ. The patient tolerated procedure very well.   The patient was observed until discharge crit

## 2021-06-01 NOTE — H&P
History & Physical Examination    Patient Name: Lori Anne  MRN: [id number]  CSN: [id number]  YOB: 1970    Pre-Operative Diagnosis:  Lumbar radiculopathy [M54.16]    Present Illness: Patient with lumbar radiculopathy for transforaminal epidur Subcutaneous Solution Pen-injector, Inject 0.25 mg into the skin once a week.  (Patient not taking: Reported on 4/30/2021 ), Disp: 3 mL, Rfl: 0  Insulin Aspart Pen (NOVOLOG FLEXPEN) 100 UNIT/ML Subcutaneous Solution Pen-injector, Uses up to 10 units three t Grandfather    • Hypertension Maternal Grandfather    • Diabetes Maternal Grandfather      Social History    Tobacco Use      Smoking status: Former Smoker        Packs/day: 1.00        Years: 14.00        Pack years: 15        Quit date: 1/21/2014

## 2021-06-02 ENCOUNTER — OFFICE VISIT (OUTPATIENT)
Dept: SURGERY | Facility: CLINIC | Age: 51
End: 2021-06-02
Payer: COMMERCIAL

## 2021-06-02 VITALS
HEART RATE: 78 BPM | WEIGHT: 315 LBS | DIASTOLIC BLOOD PRESSURE: 88 MMHG | BODY MASS INDEX: 41.75 KG/M2 | SYSTOLIC BLOOD PRESSURE: 132 MMHG | HEIGHT: 73 IN

## 2021-06-02 DIAGNOSIS — M54.16 LUMBAR RADICULOPATHY: Primary | ICD-10-CM

## 2021-06-02 PROCEDURE — 3008F BODY MASS INDEX DOCD: CPT | Performed by: NEUROLOGICAL SURGERY

## 2021-06-02 PROCEDURE — 3079F DIAST BP 80-89 MM HG: CPT | Performed by: NEUROLOGICAL SURGERY

## 2021-06-02 PROCEDURE — 99213 OFFICE O/P EST LOW 20 MIN: CPT | Performed by: NEUROLOGICAL SURGERY

## 2021-06-02 PROCEDURE — 3075F SYST BP GE 130 - 139MM HG: CPT | Performed by: NEUROLOGICAL SURGERY

## 2021-06-02 RX ORDER — ACETAMINOPHEN AND CODEINE PHOSPHATE 300; 30 MG/1; MG/1
1 TABLET ORAL EVERY 8 HOURS PRN
Qty: 40 TABLET | Refills: 0 | Status: SHIPPED | OUTPATIENT
Start: 2021-06-02 | End: 2021-12-23

## 2021-06-02 NOTE — PROGRESS NOTES
[facility]  Neurosurgery Clinic Visit      HISTORY OF PRESENT ILLNESS:  Kylah Ross is a(n) 49year old male here for follow-up on back and L>R anterior thigh pain.   Reports at rest his pain is minimal.  Was getting up to 6/10 when acti Patient is a 49year old male in no acute distress. NEUROLOGICAL:     Cognition: alert and oriented x 3    Cranial nerves: Pupils equally round and reactive to light. EOMs intact. Face is symmetrical and sensation is intact. Tongue is midline.      Pili

## 2021-06-02 NOTE — PROGRESS NOTES
Patient here for 6-week follow-up. Had second injection done yesterday, hoping this one does the trick. Today, feeling tightness in back. Having pain more to left leg, rated 2-3/10, worsens with stretching or movement.

## 2021-06-04 ENCOUNTER — APPOINTMENT (OUTPATIENT)
Dept: CARDIOLOGY | Age: 51
End: 2021-06-04

## 2021-06-08 ENCOUNTER — TELEPHONE (OUTPATIENT)
Dept: PHYSICAL THERAPY | Facility: HOSPITAL | Age: 51
End: 2021-06-08

## 2021-06-08 ENCOUNTER — TELEPHONE (OUTPATIENT)
Dept: FAMILY MEDICINE CLINIC | Facility: CLINIC | Age: 51
End: 2021-06-08

## 2021-06-08 DIAGNOSIS — M54.10 ACUTE LOW BACK PAIN WITH RADICULAR SYMPTOMS, DURATION LESS THAN 6 WEEKS: Primary | ICD-10-CM

## 2021-06-18 RX ORDER — FLASH GLUCOSE SENSOR
1 KIT MISCELLANEOUS
Qty: 2 EACH | Refills: 2 | Status: SHIPPED | OUTPATIENT
Start: 2021-06-18

## 2021-06-29 ENCOUNTER — TELEPHONE (OUTPATIENT)
Dept: SURGERY | Facility: CLINIC | Age: 51
End: 2021-06-29

## 2021-06-29 NOTE — TELEPHONE ENCOUNTER
Called pt and inquired if he needs any work restrictions or reduced time.  Pt states he would like to go back full time and the only restriction he can think of at this time is that he has difficult walking long distances or walking for long periods of time

## 2021-06-29 NOTE — TELEPHONE ENCOUNTER
Pt calling to let Dr. Maria Victoria Rico know that his most recent injection has worked well and he is having no pain. Pt would like to RTW next Monday 7.5 and asked that Dr. Maria Victoria Rico write a letter stating it is ok for him to do so.  Pt asked that letter be emailed to

## 2021-06-29 NOTE — TELEPHONE ENCOUNTER
I can write a letter but I would like to know if he is concerned about any specific activity at work. Would he like restrictions or reduced time?

## 2021-06-30 ENCOUNTER — OFFICE VISIT (OUTPATIENT)
Dept: PHYSICAL THERAPY | Age: 51
End: 2021-06-30
Attending: NURSE PRACTITIONER
Payer: COMMERCIAL

## 2021-06-30 ENCOUNTER — APPOINTMENT (OUTPATIENT)
Dept: CARDIOLOGY | Age: 51
End: 2021-06-30

## 2021-06-30 PROCEDURE — 97112 NEUROMUSCULAR REEDUCATION: CPT

## 2021-06-30 PROCEDURE — 97110 THERAPEUTIC EXERCISES: CPT

## 2021-06-30 NOTE — PROGRESS NOTES
Dx: Acute low back pain with radicular symptoms, duration less than 6 weeks (M54.10)    Insurance (Authorized # of Visits):  8           Authorizing Physician: Laroy Sicard MD visit: none scheduled  Fall Risk: standard         Precautions: Pacema positive L; positive  Duong's Sign: negative  Scour Test: R; negative L; negative  Fadir Test: R; negative L; negative  Kahlil Test: R; negative L; negative        Gait: The patient ambulates with much improved gait mechanics with no analgic pattern or sign MET  · Pt will increase hip and knee strength to grossly 4+/5 to be able to get up and down from the floor safely  · Pt will report < or = to VAS 2/10 low back pain while putting on his shoes - MET  · Pt will report < or = to VAS 2/10 low back pain while g Supine marching w/ Tr A holds, x 10 reps, 2 sets         LBW/MW/BMW w/ BB - 30 feet - 1 set each --         Supine adduction/abductionisometrics w/ Tr A holds, x 10 reps, 2 sets Supine adduction/abductionisometrics w/ Tr A holds, x 10 reps, 2 sets

## 2021-07-02 ENCOUNTER — APPOINTMENT (OUTPATIENT)
Dept: PHYSICAL THERAPY | Age: 51
End: 2021-07-02
Attending: NURSE PRACTITIONER
Payer: COMMERCIAL

## 2021-07-07 ENCOUNTER — APPOINTMENT (OUTPATIENT)
Dept: PHYSICAL THERAPY | Age: 51
End: 2021-07-07
Attending: NURSE PRACTITIONER
Payer: COMMERCIAL

## 2021-07-14 ENCOUNTER — APPOINTMENT (OUTPATIENT)
Dept: PHYSICAL THERAPY | Age: 51
End: 2021-07-14
Attending: NURSE PRACTITIONER
Payer: COMMERCIAL

## 2021-07-16 ENCOUNTER — APPOINTMENT (OUTPATIENT)
Dept: PHYSICAL THERAPY | Age: 51
End: 2021-07-16
Attending: NURSE PRACTITIONER
Payer: COMMERCIAL

## 2021-07-23 ENCOUNTER — TELEPHONE (OUTPATIENT)
Dept: SURGERY | Facility: CLINIC | Age: 51
End: 2021-07-23

## 2021-07-23 NOTE — TELEPHONE ENCOUNTER
Faxed Medical Records from May 2021 to Present to [address] [place] [address] Pb. Confirmation Received.

## 2021-07-23 NOTE — TELEPHONE ENCOUNTER
Received request for Medical Records from May 2021 to Present from [employer] for [address] [facility] Term Disability.

## 2021-09-02 NOTE — PATIENT INSTRUCTIONS
Please generate Refill policies:    • Allow 2-3 business days for refills; controlled substances may take longer.   • Contact your pharmacy at least 5 days prior to running out of medication and have them send an electronic request or submit request through the “request re Depending on your insurance carrier, approval may take 3-10 days. It is highly recommended patients contact their insurance carrier directly to determine coverage.   If test is done without insurance authorization, patient may be responsible for the entire

## 2021-09-22 ENCOUNTER — TELEPHONE (OUTPATIENT)
Dept: CARDIOLOGY | Age: 51
End: 2021-09-22

## 2021-10-21 RX ORDER — ACETAMINOPHEN AND CODEINE PHOSPHATE 300; 30 MG/1; MG/1
TABLET ORAL
Qty: 40 TABLET | Refills: 0 | OUTPATIENT
Start: 2021-10-21

## 2021-10-21 NOTE — TELEPHONE ENCOUNTER
Medication: Tylenol #3    Date of last refill: 6/2/21 (#40/0)  [place]      Last office visit: 6/2/21  Due back to clinic per last office note:  RTC in 6 weeks  Date next office visit scheduled:  No future appointments.

## 2021-10-21 NOTE — TELEPHONE ENCOUNTER
Patient states after his injection he was pain-free. He returned to work. Has not been seen since June.   Recommend he make a follow-up appointment if he feels like he is needing medication     Tylenol 3 denied

## 2021-10-22 ENCOUNTER — TELEPHONE (OUTPATIENT)
Dept: CARDIOLOGY | Age: 51
End: 2021-10-22

## 2021-11-09 ENCOUNTER — TELEPHONE (OUTPATIENT)
Dept: CARDIOLOGY | Age: 51
End: 2021-11-09

## 2021-12-09 ENCOUNTER — TELEPHONE (OUTPATIENT)
Dept: CARDIOLOGY | Age: 51
End: 2021-12-09

## 2021-12-20 ENCOUNTER — APPOINTMENT (OUTPATIENT)
Dept: CARDIOLOGY | Age: 51
End: 2021-12-20
Attending: INTERNAL MEDICINE

## 2021-12-20 ENCOUNTER — TELEPHONE (OUTPATIENT)
Dept: CARDIOLOGY | Age: 51
End: 2021-12-20

## 2021-12-20 RX ORDER — DICLOFENAC SODIUM 75 MG/1
TABLET, DELAYED RELEASE ORAL
Qty: 60 TABLET | Refills: 1 | OUTPATIENT
Start: 2021-12-20

## 2021-12-20 NOTE — TELEPHONE ENCOUNTER
Medication: Diclofenac     Date of last refill: 4/21/21 (#60/1)  [place]       Last office visit: 6/2/21  Due back to clinic per last office note:  -RTC in 6 weeks  -start PT  Date next office visit scheduled:    Future

## 2021-12-20 NOTE — TELEPHONE ENCOUNTER
We haven't seen him for 6 months. He can either get refills from PCP or see us in the office to discuss symptoms.

## 2022-03-25 ENCOUNTER — TELEPHONE (OUTPATIENT)
Dept: FAMILY MEDICINE CLINIC | Facility: CLINIC | Age: 52
End: 2022-03-25

## 2022-07-29 NOTE — RESPIRATORY THERAPY NOTE
EVA - Equipment Use Daily Summary:                  . Set Mode: AUTO CPAP WITH C-FLEX                . Usage in hours: 11:56                . 90% Pressure (EPAP) level: 9.2                . 90% Insp. Pressure (IPAP): Юлия Netfercho  AHI: 28.9
EVA - Equipment Use Daily Summary:                  . Set Mode: AUTO CPAP WITH C-FLEX                . Usage in hours: 12:46                . 90% Pressure (EPAP) level: 13                . 90% Insp. Pressure (IPAP): Clau Larose  AHI: 40.2
EVA - Equipment Use Daily Summary:  · Set Mode CFLEX  · Usage in hours: 14:36  · 90% Pressure (EPAP) level:   · 90% Insp Pressure (IPAP): 9.5  · AHI: 24  · Supplemental Oxygen:  · Comments:
EVA - Equipment Use Daily Summary:  · Set Mode CFLEX  · Usage in hours: 8  · 90% Pressure (EPAP) level:   · 90% Insp Pressure (IPAP): 4  · AHI: 36  · Supplemental Oxygen:  · Comments:
EVA - Equipment Use Daily Summary:  · Set Mode cflex  · Usage in hours: 4:18  · 90% Pressure (EPAP) level: 8.5  · 90% Insp Pressure (IPAP):   · AHI: 20.7  · Supplemental Oxygen:   · Comments:
EVA : EQUIPMENT USE: DAILY SUMMARY                                            SET MODE: AUTO CPAP WITH CFLEX                                          USAGE IN HOURS: 6:55                                          90
EVA : EQUIPMENT USE: DAILY SUMMARY                                            SET MODE: AUTO CPAP WITH CFLEX                                          USAGE IN HOURS: 7:49                                          90
1.74

## 2023-09-28 NOTE — TELEPHONE ENCOUNTER
New referral for PT order placed.
Physical therapy dept is calling requesting a new referral for acute low back pain be placed in Epic. Please advise.    The referral from 4/6/2021 is not a valid referral per physical therapy dept
no...

## 2023-10-18 NOTE — TELEPHONE ENCOUNTER
FYI: Pt called and states since Monday night his BS has been high. Pt states he is taking his medications but his sugars will not go down below 300. BS at 0805 was 338.  Pt also states he has been dealing with COVID-19 symptoms for the past two week-fatigue
Speaking Coherently/Age appropriate

## 2025-04-02 NOTE — PROGRESS NOTES
CHIEF COMPLAINT:   Patient presents with:  Nasal Congestion: diarrhea/fatigue/body aches/SOB x 3-4 days. exposed to positive covid pt      HPI:   Gonzales Bliss is a 53year old male presents to clinic with complaints of ill symptoms.  Patient has had sympto What Is The Reason For Today's Visit?: Full Body Skin Examination • ROSUVASTATIN CALCIUM 20 MG Oral Tab TAKE 1 TABLET(20 MG) BY MOUTH EVERY NIGHT 90 tablet 0   • ALBUTEROL SULFATE (2.5 MG/3ML) 0.083% Inhalation Nebu Solmary lou USE 1 VIAL VIA NEBULIZER EVERY 6 HOURS AS NEEDED FOR WHEEZING 75 mL 0   • torsemide 20 MG Oral Tab Ta NOSE: nostrils patent, clear nasal mucus, nasal mucosa pink and mild inflamed  THROAT: oral mucosa pink, moist. Posterior pharynx not erythematous and injected. No exudates. Tonsils 1/4. Uvula is midline. Breath is not malodorous.   No trismus, hoarseness · Stay away from work, school, and public places. Limit physical contact with family members. Limit visitors. Don't kiss anyone or share eating or drinking utensils. Clean surfaces you touch with disinfectant.  This is to help prevent the virus from spreadi · If you need to go to a hospital or clinic, expect that the healthcare staff will wear protective equipment such as masks, gowns, gloves, and eye protection. You may be advised to wait in or enter through a separate area.  This is to prevent the possible v · Staying hydrated. Drinking liquids is the best way to prevent dehydration. Try to drink 6 to 8 glasses of liquids every day, or as advised by your provider. Also check with your provider about which fluids are best for you.  Don't drink fluids that conta · Don’t let anyone share household items with the sick person. This includes eating and drinking tools, towels, sheets, or blankets. · Clean fabrics and laundry thoroughly. · Keep other people and pets away from the sick person.     When you can stop self If you have a weak immune system and COVID-19, or if you've had severe COVID-19,  your instructions on when to stop isolation will be somewhat different. Some conditions and treatments can cause a weak immune system.  These include cancer treatment, bone ma · Confusion or trouble waking  · Fainting or loss of consciousness  · Coughing up blood  Going home from the hospital   If you were diagnosed with COVID-19 and were recently discharged from the hospital:   · Follow the instructions above for self-care and

## (undated) DIAGNOSIS — I10 PRIMARY HYPERTENSION: ICD-10-CM

## (undated) DIAGNOSIS — E78.00 HYPERCHOLESTEREMIA: ICD-10-CM

## (undated) DIAGNOSIS — E11.65 TYPE 2 DIABETES MELLITUS WITH HYPERGLYCEMIA, WITHOUT LONG-TERM CURRENT USE OF INSULIN (HCC): Primary | ICD-10-CM

## (undated) DEVICE — MARKER SKIN 2 TIP

## (undated) DEVICE — GLOVE SURG SENSICARE SZ 6-1/2

## (undated) DEVICE — GLOVE SURG SENSICARE SZ 7-1/2

## (undated) DEVICE — REMOVER DURAPREP 3M

## (undated) DEVICE — PAIN TRAY: Brand: MEDLINE INDUSTRIES, INC.

## (undated) DEVICE — BANDAID COVERLET 1X3

## (undated) DEVICE — NEEDLE SPINAL 22X5 405148

## (undated) DEVICE — NEEDLE SPINAL 22X7 405149

## (undated) NOTE — LETTER
Date: 12/18/2020    Patient Name: Nima Whitley          To Whom it may concern: This letter has been written at the patient's request. The above patient was seen at the [facility] for treatment of a medical condition.     This patient should

## (undated) NOTE — LETTER
Date: 4/12/2021    Patient Name: Mao Acevedo          To Whom it may concern: The above patient was seen at the [facility] for treatment of a medical condition. This patient should be excused from attending work 4/6/21 through 4/19/21.

## (undated) NOTE — LETTER
[facility] [address], [address]  Consent for Procedure/Sedation    Date:6/19/2018    Time: 1730      1.  I authorize the performance upon Dm Badillo the following:   Insertion of Automatic Implantable Cardioverter Defibrillator ________________________________    ___________________    Witness: _________________________      Date: ___________________    Printed: 2018   5:32 PM  Patient Name: Yaima Mccormickton        : 1970       Medical Record #: [id number]

## (undated) NOTE — LETTER
Date: 6/2/2021    Patient Name: Duke Jackson          To Whom it may concern: This letter has been written at the patient's request. The above patient was seen at the [facility] for treatment of a medical condition.     This patient should b

## (undated) NOTE — LETTER
June 22, 2018        [address]  [address] [place]      Dear Hector Watson:    I am the Nurse Care Manager with Morgan Rees MD office. Just reaching out to see how you are doing since your discharge home.    When you have a minute would

## (undated) NOTE — LETTER
Date: 12/14/2020    Patient Name: Hannah Herbert          To Whom it may concern: This letter has been written at the patient's request. The above patient was seen at the [facility] for treatment of a medical condition.  The patient may return

## (undated) NOTE — Clinical Note
FYI, TCM call made, see notes. [facility] scheduled TCM HFU on 6/29/18, this is the day the patient can get to the PCP's office.

## (undated) NOTE — LETTER
Date: 2021    Patient Name: Brad Cleveland  : 1970    To Whom it may concern: The above patient was seen at the [facility] for treatment of a medical condition.     This patient should be excused from attending work from 21-

## (undated) NOTE — LETTER
Date: 4/19/2021    Patient Name: Ina Lewis          To Whom it may concern: The above patient was seen at the [facility] for treatment of a medical condition. This patient should be excused from attending work 4/6/21 through 4/21/21.

## (undated) NOTE — LETTER
[address] [address] [address]  [address] [facility] [address]  [phone]     Patient: Brad Cleveland   YOB: 1970   Date of Visit: 12/3/2020     Dear Employer,        December 3, 2020    At [facility], we are taking spec Persons infected with SARS-CoV-2 who never develop COVID-19 symptoms may discontinue isolation and other precautions 10 days after the date of their first positive RT-PCR test for SARS-CoV-2 RNA.     Persons who are asymptomatic but have been exposed, CDC r

## (undated) NOTE — LETTER
[facility] [address], [address]  Consent for Procedure/Sedation    Date: 6/17/2018    Time: 1200      1.  I authorize the performance upon Vinod De Paz the following:cardiac catheterization, left ventricular cineangiography, bilat period, the physician will determine when the applicable recovery period ends for purposes of reinstating the Do Not Resuscitate (DNR) order.     Signature of Patient: ____________________________________________________    Signature of person authorized

## (undated) NOTE — LETTER
Date: 6/29/2021    Patient Name: Kathleen Browne          To Whom it may concern: This letter has been written at the patient's request. The above patient was seen at the [facility] for treatment of a medical condition.       The patient may re

## (undated) NOTE — ED AVS SNAPSHOT
Yaima Chacon   MRN: [id number]    Department:  THE [facility] Emergency Department in [place]   Date of Visit:  5/13/2019           Disclosure     Insurance plans vary and the physician(s) referred by the ER may not be covered by your plan.  Please contact yo tell this physician (or your personal doctor if your instructions are to return to your personal doctor) about any new or lasting problems. The primary care or specialist physician will see patients referred from the [facility] Emergency Department.  Severo Pastures

## (undated) NOTE — LETTER
06/06/18        [address]  [address]      Dear Alirio Fuentes records indicate that you have outstanding lab work and or testing that was ordered for you and has not yet been completed:          Comp Metabolic Panel (14) [E]

## (undated) NOTE — LETTER
[facility] [address], [address]  Consent for Procedure/Sedation    Date: 6/19/2018    Time: 1700      1. I authorize the performance upon Lori Anne the following:  Single chamber ICD placement     2.  I authorize Dr. Catalina Catherine  (and ________________________________    ___________________    Witness: _________________________      Date: ___________________    Printed: 2018   5:00 PM  Patient Name: Napoleon Stanton        : 1970       Medical Record #: [id number]

## (undated) NOTE — LETTER
[facility] [address]  [place], [address]  ?  01/26/20  ? Re: Yaima Chacon  ?   To Whom It May Concern:     Yaima Chacon was admitted to [facility] from 1/23/2020 to 01/26/20.     Please excuse Yaima Chacon from attending work for